# Patient Record
Sex: FEMALE | Race: WHITE | Employment: OTHER | URBAN - METROPOLITAN AREA
[De-identification: names, ages, dates, MRNs, and addresses within clinical notes are randomized per-mention and may not be internally consistent; named-entity substitution may affect disease eponyms.]

---

## 2019-03-30 LAB
ALBUMIN SERPL-MCNC: NORMAL G/DL
ALP BLD-CCNC: NORMAL U/L
ALT SERPL-CCNC: NORMAL U/L
ANION GAP SERPL CALCULATED.3IONS-SCNC: NORMAL MMOL/L
AST SERPL-CCNC: NORMAL U/L
AVERAGE GLUCOSE: NORMAL
BILIRUB SERPL-MCNC: NORMAL MG/DL (ref 0.1–1.4)
BUN BLDV-MCNC: NORMAL MG/DL
CALCIUM SERPL-MCNC: NORMAL MG/DL
CHLORIDE BLD-SCNC: NORMAL MMOL/L
CHOLESTEROL, TOTAL: NORMAL MG/DL
CHOLESTEROL/HDL RATIO: NORMAL
CO2: NORMAL MMOL/L
CREAT SERPL-MCNC: NORMAL MG/DL
GFR CALCULATED: NORMAL
GLUCOSE BLD-MCNC: NORMAL MG/DL
HBA1C MFR BLD: 5.4 %
HDLC SERPL-MCNC: NORMAL MG/DL (ref 35–70)
LDL CHOLESTEROL CALCULATED: NORMAL MG/DL (ref 0–160)
POTASSIUM SERPL-SCNC: NORMAL MMOL/L
SODIUM BLD-SCNC: NORMAL MMOL/L
TOTAL PROTEIN: NORMAL
TRIGL SERPL-MCNC: NORMAL MG/DL
VLDLC SERPL CALC-MCNC: NORMAL MG/DL

## 2019-05-15 ENCOUNTER — OFFICE VISIT (OUTPATIENT)
Dept: FAMILY MEDICINE CLINIC | Age: 79
End: 2019-05-15
Payer: MEDICARE

## 2019-05-15 VITALS
BODY MASS INDEX: 29.56 KG/M2 | TEMPERATURE: 98 F | SYSTOLIC BLOOD PRESSURE: 138 MMHG | OXYGEN SATURATION: 96 % | WEIGHT: 206 LBS | HEART RATE: 79 BPM | DIASTOLIC BLOOD PRESSURE: 64 MMHG

## 2019-05-15 DIAGNOSIS — R07.0 PAIN IN THROAT: ICD-10-CM

## 2019-05-15 DIAGNOSIS — R09.82 POSTNASAL DRIP: ICD-10-CM

## 2019-05-15 DIAGNOSIS — J01.90 ACUTE NON-RECURRENT SINUSITIS, UNSPECIFIED LOCATION: Primary | ICD-10-CM

## 2019-05-15 PROCEDURE — 99213 OFFICE O/P EST LOW 20 MIN: CPT | Performed by: PHYSICIAN ASSISTANT

## 2019-05-15 RX ORDER — AZITHROMYCIN 250 MG/1
250 TABLET, FILM COATED ORAL SEE ADMIN INSTRUCTIONS
Qty: 6 TABLET | Refills: 0 | Status: SHIPPED | OUTPATIENT
Start: 2019-05-15 | End: 2019-05-20

## 2019-05-15 RX ORDER — SIMVASTATIN 40 MG
40 TABLET ORAL
COMMUNITY
End: 2019-06-04 | Stop reason: ALTCHOICE

## 2019-05-15 RX ORDER — MELOXICAM 15 MG/1
TABLET ORAL
COMMUNITY
Start: 2014-11-10 | End: 2019-06-04 | Stop reason: SDUPTHER

## 2019-05-15 RX ORDER — DULOXETIN HYDROCHLORIDE 60 MG/1
CAPSULE, DELAYED RELEASE ORAL
Refills: 3 | COMMUNITY
Start: 2019-04-27 | End: 2019-09-05 | Stop reason: SDUPTHER

## 2019-05-15 RX ORDER — DOCUSATE SODIUM 100 MG/1
100 CAPSULE, LIQUID FILLED ORAL DAILY PRN
COMMUNITY
Start: 2011-11-23

## 2019-05-15 RX ORDER — HYDROCHLOROTHIAZIDE 25 MG/1
TABLET ORAL
COMMUNITY
Start: 2014-08-09 | End: 2019-06-04 | Stop reason: SDUPTHER

## 2019-05-15 SDOH — HEALTH STABILITY: MENTAL HEALTH: HOW OFTEN DO YOU HAVE A DRINK CONTAINING ALCOHOL?: NEVER

## 2019-05-15 NOTE — PROGRESS NOTES
NEPHRECTOMY      right     Medications:     Current Outpatient Medications:     meloxicam (MOBIC) 15 MG tablet, , Disp: , Rfl:     Multiple Vitamins-Minerals (ICAPS PO), Take  by mouth., Disp: , Rfl:     senna (SENOKOT) 8.6 MG tablet, Take 1 tablet by mouth., Disp: , Rfl:     aspirin 325 MG EC tablet, Take 325 mg by mouth daily. , Disp: , Rfl:     hydrochlorothiazide (HYDRODIURIL) 25 MG tablet, , Disp: , Rfl:     dorzolamide (TRUSOPT) 2 % ophthalmic solution, , Disp: , Rfl:     latanoprost (XALATAN) 0.005 % ophthalmic solution, , Disp: , Rfl:     timolol (TIMOPTIC) 0.5 % ophthalmic solution, , Disp: , Rfl:     metoprolol (TOPROL-XL) 25 MG XL tablet, Take 25 mg by mouth daily. , Disp: , Rfl:     simvastatin (ZOCOR) 40 MG tablet, Take 40 mg by mouth nightly.  , Disp: , Rfl:     Allergies: Allergies   Allergen Reactions    Penicillins        Social History:     Social History     Tobacco Use    Smoking status: Never Smoker    Smokeless tobacco: Never Used   Substance Use Topics    Alcohol use: No    Drug use: No       Physical Exam:     Vitals:    05/15/19 1452   BP: 138/64   Pulse: 79   Temp: 98 °F (36.7 °C)   SpO2: 96%   Weight: 206 lb (93.4 kg)       Exam:  Physical Exam  Nurse's notes and vital signs reviewed. The patient is not hypoxic. General: Alert, no acute distress, patient resting comfortably Patient is not toxic or lethargic. Skin: Warm, intact, no pallor noted. There is no evidence of rash at this time. Head: Normocephalic, atraumatic. Eye: Normal conjunctiva  Ears, Nose, Throat: Right tympanic membrane clear, left tympanic membrane clear. No drainage or discharge noted. No pre- or post-auricular tenderness, erythema, or swelling noted. Moderate nasal congestion rhinorrhea. No facial erythema. Posterior oropharynx shows no erythema, tonsillar hypertrophy, asymmetry or peritonsillar abscess and no evidence of exudate. the uvula is midline. No trismus or drooling is noted.  Moist mucous membranes. Neck: No anterior/posterior lymphadenopathy noted. No erythema, no masses, no fluctuance or induration noted. No meningeal signs. Cardio: Regular Rate and Rhythm  Respiratory: No acute distress, no rhonchi, wheezing or crackles noted. No stridor or retractions are noted. Abdomen: Normal bowel sounds, soft, nontender, no masses detected. No rebound, guarding, or rigidity noted. Musculoskeletal: No obvious deformity, no edema, no calf tenderness. No erythema. Neurological: A&O x4, normal speech  Psychiatric: Cooperative         Testing:           Medical Decision Making:   The patient has been seen and evaluated. The vital signs have been reviewed. The patient does not appear to be toxic or lethargic. The patient will be treated with azithromycin. She is tolerated this well in the past.    The patient was educated on the proper dosage of motrin and tylenol and the appropriate intervals of each. The patient is to increase fluid intake over the next several days. The patient is to use OTC decongestant as needed. Additionally she is to use over-the-counter nasal spray. The patient is to return to express care or go directly to the emergency department should any of the signs or symptoms worsen. The patient is to followup with primary care physician in 2-3 days for repeat evaluation. The patient has no other questions or concerns at this time the patient will be discharged home. Clinical Impression:   Severiano Pennant was seen today for congestion and cough. Diagnoses and all orders for this visit:    Acute non-recurrent sinusitis, unspecified location    Postnasal drip    Pain in throat    Other orders  -     azithromycin (ZITHROMAX) 250 MG tablet; Take 1 tablet by mouth See Admin Instructions for 5 days 500mg on day 1 followed by 250mg on days 2 - 5        The patient is to call for any concerns or return if any of the signs or symptoms worsen.  The patient is to follow-up with PCP in the next 2-3 days for repeat evaluation repeat assessment or go directly to the emergency department.      SIGNATURE: Matthew Bond III, PA-C

## 2019-06-04 ENCOUNTER — OFFICE VISIT (OUTPATIENT)
Dept: PRIMARY CARE CLINIC | Age: 79
End: 2019-06-04
Payer: MEDICARE

## 2019-06-04 VITALS
BODY MASS INDEX: 29.49 KG/M2 | SYSTOLIC BLOOD PRESSURE: 122 MMHG | OXYGEN SATURATION: 96 % | WEIGHT: 206 LBS | HEIGHT: 70 IN | HEART RATE: 104 BPM | DIASTOLIC BLOOD PRESSURE: 74 MMHG

## 2019-06-04 DIAGNOSIS — E78.5 HYPERLIPIDEMIA WITH TARGET LDL LESS THAN 100: Chronic | ICD-10-CM

## 2019-06-04 DIAGNOSIS — I80.01 PHLEBITIS OF SUPERFICIAL VEIN OF RIGHT LOWER EXTREMITY: ICD-10-CM

## 2019-06-04 DIAGNOSIS — N85.00 ENDOMETRIAL HYPERPLASIA: ICD-10-CM

## 2019-06-04 DIAGNOSIS — M25.552 HIP PAIN, ACUTE, LEFT: ICD-10-CM

## 2019-06-04 DIAGNOSIS — I10 HYPERTENSION, ESSENTIAL, BENIGN: Primary | Chronic | ICD-10-CM

## 2019-06-04 PROCEDURE — 99214 OFFICE O/P EST MOD 30 MIN: CPT | Performed by: FAMILY MEDICINE

## 2019-06-04 RX ORDER — SIMVASTATIN 40 MG
40 TABLET ORAL NIGHTLY
Qty: 90 TABLET | Refills: 3 | Status: SHIPPED
Start: 2019-06-04 | End: 2020-09-23 | Stop reason: SDUPTHER

## 2019-06-04 ASSESSMENT — ENCOUNTER SYMPTOMS
GASTROINTESTINAL NEGATIVE: 1
EYES NEGATIVE: 1
ALLERGIC/IMMUNOLOGIC NEGATIVE: 1
RESPIRATORY NEGATIVE: 1

## 2019-06-04 ASSESSMENT — PATIENT HEALTH QUESTIONNAIRE - PHQ9
SUM OF ALL RESPONSES TO PHQ QUESTIONS 1-9: 0
1. LITTLE INTEREST OR PLEASURE IN DOING THINGS: 0
SUM OF ALL RESPONSES TO PHQ9 QUESTIONS 1 & 2: 0
SUM OF ALL RESPONSES TO PHQ QUESTIONS 1-9: 0
2. FEELING DOWN, DEPRESSED OR HOPELESS: 0

## 2019-06-04 NOTE — PROGRESS NOTES
19     Mauricio Due    : 1940 Sex: female   Age: 70 y.o. Chief Complaint   Patient presents with    Hypertension    Hyperlipidemia    Hip Pain     Left hip pain x 1 week. Just taking tylenol       Prior to Admission medications    Medication Sig Start Date End Date Taking? Authorizing Provider   simvastatin (ZOCOR) 40 MG tablet Take 1 tablet by mouth nightly 19  Yes Raulito Guillory DO   DULoxetine (CYMBALTA) 60 MG extended release capsule  19  Yes Historical Provider, MD   docusate sodium (COLACE) 100 MG capsule Take 100 mg by mouth 11  Yes Historical Provider, MD   metoprolol tartrate (LOPRESSOR) 25 MG tablet  19  Yes Historical Provider, MD   meloxicam (MOBIC) 15 MG tablet  11/10/14  Yes Historical Provider, MD   Multiple Vitamins-Minerals (ICAPS PO) Take  by mouth. Yes Historical Provider, MD   hydrochlorothiazide (HYDRODIURIL) 25 MG tablet  14  Yes Historical Provider, MD   dorzolamide (TRUSOPT) 2 % ophthalmic solution  14  Yes Historical Provider, MD   latanoprost (XALATAN) 0.005 % ophthalmic solution  14  Yes Historical Provider, MD   timolol (TIMOPTIC) 0.5 % ophthalmic solution  14  Yes Historical Provider, MD          HPI: Patient presents today follow-up on hypertension superficial phlebitis hyperlipidemia all stable. Recent problems with endometrial hyperplasia for endometrial biopsy on the  of this month. Left hip pain we'll go ahead with an x-ray study most likely related to degenerative arthritis controlled currently with Tylenol. Review of Systems   Constitutional: Negative. HENT: Negative. Eyes: Negative. Respiratory: Negative. Gastrointestinal: Negative. Endocrine: Negative. Genitourinary: Negative. Musculoskeletal: Positive for arthralgias. Mild left hip pain   Skin: Negative. Allergic/Immunologic: Negative. Neurological: Negative. Hematological: Negative. Psychiatric/Behavioral: Negative. Current Outpatient Medications:     simvastatin (ZOCOR) 40 MG tablet, Take 1 tablet by mouth nightly, Disp: 90 tablet, Rfl: 3    DULoxetine (CYMBALTA) 60 MG extended release capsule, , Disp: , Rfl: 3    docusate sodium (COLACE) 100 MG capsule, Take 100 mg by mouth, Disp: , Rfl:     metoprolol tartrate (LOPRESSOR) 25 MG tablet, , Disp: , Rfl:     meloxicam (MOBIC) 15 MG tablet, , Disp: , Rfl:     Multiple Vitamins-Minerals (ICAPS PO), Take  by mouth., Disp: , Rfl:     hydrochlorothiazide (HYDRODIURIL) 25 MG tablet, , Disp: , Rfl:     dorzolamide (TRUSOPT) 2 % ophthalmic solution, , Disp: , Rfl:     latanoprost (XALATAN) 0.005 % ophthalmic solution, , Disp: , Rfl:     timolol (TIMOPTIC) 0.5 % ophthalmic solution, , Disp: , Rfl:     Allergies   Allergen Reactions    Penicillins        Social History     Tobacco Use    Smoking status: Never Smoker    Smokeless tobacco: Never Used   Substance Use Topics    Alcohol use: Never     Frequency: Never    Drug use: Never      Past Surgical History:   Procedure Laterality Date    APPENDECTOMY      BLADDER STONE REMOVAL      DILATION AND CURETTAGE OF UTERUS      HERNIA REPAIR      TOTAL KNEE ARTHROPLASTY      right    TOTAL NEPHRECTOMY      right     Family History   Problem Relation Age of Onset    Stroke Mother     Other Mother         phlebitis     Past Medical History:   Diagnosis Date    Arthritis     Cystitis     Depression     DVT of lower limb, acute ([facility] [address] 3/15/2013    Hernia     HIGH CHOLESTEROL     Hypertension     Kidney stones     Sinus infection     Superficial phlebitis of leg 10/30/2014       Vitals:    06/04/19 1314   BP: 122/74   Pulse: 104   SpO2: 96%   Weight: 206 lb (93.4 kg)   Height: 5' 10\" (1.778 m)     BP Readings from Last 3 Encounters:   06/04/19 122/74   05/15/19 138/64        Physical Exam   Constitutional: She is oriented to person, place, and time.  She appears well-developed and well-nourished. HENT:   Head: Normocephalic. Right Ear: External ear normal.   Left Ear: External ear normal.   Nose: Nose normal.   Mouth/Throat: Oropharynx is clear and moist.   Eyes: Pupils are equal, round, and reactive to light. Conjunctivae and EOM are normal.   Neck: Normal range of motion. Neck supple. Cardiovascular: Normal rate and intact distal pulses. Pulmonary/Chest: Breath sounds normal.   Abdominal: Soft. Bowel sounds are normal.   Musculoskeletal: Normal range of motion. Some problems with left hip pain to palpation. mild difficulty with ambulation. Denies trauma   Neurological: She is alert and oriented to person, place, and time. Skin: Skin is warm and dry. Psychiatric: She has a normal mood and affect. Her behavior is normal.   Nursing note and vitals reviewed. plan x-ray today. Maintain current medications. Medically stable for recommended surgical procedure. Reassessment in 3 months lab work prior. Plan Per Assessment:  There are no diagnoses linked to this encounter. No follow-ups on file. Rony Lockhart DO    Note was generated with the assistance of voice recognition software. Document was reviewed however may contain grammatical errors.

## 2019-06-19 ENCOUNTER — HOSPITAL ENCOUNTER (OUTPATIENT)
Age: 79
Discharge: HOME | End: 2019-06-21

## 2019-06-19 PROCEDURE — 88305 TISSUE EXAM BY PATHOLOGIST: CPT

## 2019-07-23 RX ORDER — MELOXICAM 15 MG/1
15 TABLET ORAL DAILY
Qty: 30 TABLET | Refills: 3 | Status: SHIPPED | OUTPATIENT
Start: 2019-07-23 | End: 2019-09-05 | Stop reason: SDUPTHER

## 2019-09-05 ENCOUNTER — OFFICE VISIT (OUTPATIENT)
Dept: PRIMARY CARE CLINIC | Age: 79
End: 2019-09-05
Payer: MEDICARE

## 2019-09-05 VITALS
TEMPERATURE: 98.4 F | BODY MASS INDEX: 29.56 KG/M2 | HEART RATE: 100 BPM | RESPIRATION RATE: 18 BRPM | DIASTOLIC BLOOD PRESSURE: 82 MMHG | WEIGHT: 206 LBS | OXYGEN SATURATION: 93 % | SYSTOLIC BLOOD PRESSURE: 124 MMHG

## 2019-09-05 DIAGNOSIS — H40.10X1 OPEN-ANGLE GLAUCOMA OF BOTH EYES, MILD STAGE, UNSPECIFIED OPEN-ANGLE GLAUCOMA TYPE: ICD-10-CM

## 2019-09-05 DIAGNOSIS — Z96.651 HISTORY OF TOTAL RIGHT KNEE REPLACEMENT: ICD-10-CM

## 2019-09-05 DIAGNOSIS — I10 HYPERTENSION, ESSENTIAL, BENIGN: Primary | Chronic | ICD-10-CM

## 2019-09-05 DIAGNOSIS — I80.01 PHLEBITIS OF SUPERFICIAL VEIN OF RIGHT LOWER EXTREMITY: ICD-10-CM

## 2019-09-05 DIAGNOSIS — Z86.718 HISTORY OF DVT (DEEP VEIN THROMBOSIS): ICD-10-CM

## 2019-09-05 DIAGNOSIS — E78.5 HYPERLIPIDEMIA WITH TARGET LDL LESS THAN 100: Chronic | ICD-10-CM

## 2019-09-05 DIAGNOSIS — M79.7 FIBROMYALGIA: ICD-10-CM

## 2019-09-05 PROCEDURE — 99214 OFFICE O/P EST MOD 30 MIN: CPT | Performed by: FAMILY MEDICINE

## 2019-09-05 RX ORDER — ASPIRIN 81 MG/1
TABLET, CHEWABLE ORAL
COMMUNITY
Start: 2014-12-09 | End: 2019-12-10 | Stop reason: CLARIF

## 2019-09-05 RX ORDER — DULOXETIN HYDROCHLORIDE 60 MG/1
60 CAPSULE, DELAYED RELEASE ORAL DAILY
Qty: 30 CAPSULE | Refills: 3 | Status: SHIPPED
Start: 2019-09-05 | End: 2020-03-12 | Stop reason: SDUPTHER

## 2019-09-05 RX ORDER — MELOXICAM 15 MG/1
15 TABLET ORAL DAILY
Qty: 90 TABLET | Refills: 3 | Status: SHIPPED | OUTPATIENT
Start: 2019-09-05 | End: 2019-10-07 | Stop reason: ALTCHOICE

## 2019-09-05 ASSESSMENT — ENCOUNTER SYMPTOMS
RESPIRATORY NEGATIVE: 1
GASTROINTESTINAL NEGATIVE: 1
EYES NEGATIVE: 1
ALLERGIC/IMMUNOLOGIC NEGATIVE: 1

## 2019-09-05 NOTE — PROGRESS NOTES
Last 3 Encounters:   09/05/19 124/82   06/04/19 122/74   05/15/19 138/64        Physical Exam   Constitutional: She is oriented to person, place, and time. She appears well-developed and well-nourished. HENT:   Head: Normocephalic. Right Ear: External ear normal.   Left Ear: External ear normal.   Nose: Nose normal.   Mouth/Throat: Oropharynx is clear and moist.   Eyes: Pupils are equal, round, and reactive to light. Conjunctivae and EOM are normal.   Neck: Normal range of motion. Neck supple. Cardiovascular: Normal rate and intact distal pulses. Pulmonary/Chest: Breath sounds normal.   Abdominal: Soft. Bowel sounds are normal.   Musculoskeletal: Normal range of motion. Neurological: She is alert and oriented to person, place, and time. Skin: Skin is warm and dry. Psychiatric: She has a normal mood and affect. Her behavior is normal.   Nursing note and vitals reviewed. Exam findings are stable as noted. No significant joint swelling noted. Peripheral pulses intact and equal.  Neurologically stable. Labs will be completed with next visit. Reassessment 3 months. Plan Per Assessment:  Zeke Barron was seen today for hypertension and hyperlipidemia. Diagnoses and all orders for this visit:    Hypertension, essential, benign  -     CBC Auto Differential; Future  -     Comprehensive Metabolic Panel; Future  -     Lipid Panel; Future  -     T4; Future  -     TSH without Reflex; Future    Phlebitis of superficial vein of right lower extremity    Hyperlipidemia with target LDL less than 100  -     CBC Auto Differential; Future  -     Comprehensive Metabolic Panel; Future  -     Lipid Panel; Future  -     T4; Future  -     TSH without Reflex; Future    History of DVT (deep vein thrombosis)    Fibromyalgia  -     CBC Auto Differential; Future  -     Comprehensive Metabolic Panel; Future  -     Lipid Panel; Future  -     T4; Future  -     TSH without Reflex;  Future    Open-angle glaucoma of both

## 2019-09-13 ENCOUNTER — NURSE ONLY (OUTPATIENT)
Dept: PRIMARY CARE CLINIC | Age: 79
End: 2019-09-13
Payer: MEDICARE

## 2019-09-13 DIAGNOSIS — Z23 NEED FOR INFLUENZA VACCINATION: Primary | ICD-10-CM

## 2019-09-13 PROCEDURE — G0008 ADMIN INFLUENZA VIRUS VAC: HCPCS | Performed by: FAMILY MEDICINE

## 2019-09-13 PROCEDURE — 90653 IIV ADJUVANT VACCINE IM: CPT | Performed by: FAMILY MEDICINE

## 2019-09-20 RX ORDER — MECLIZINE HCL 12.5 MG/1
12.5 TABLET ORAL 3 TIMES DAILY PRN
COMMUNITY
End: 2019-10-07 | Stop reason: ALTCHOICE

## 2019-10-07 ENCOUNTER — OFFICE VISIT (OUTPATIENT)
Dept: FAMILY MEDICINE CLINIC | Age: 79
End: 2019-10-07
Payer: MEDICARE

## 2019-10-07 VITALS
SYSTOLIC BLOOD PRESSURE: 122 MMHG | DIASTOLIC BLOOD PRESSURE: 76 MMHG | HEIGHT: 70 IN | OXYGEN SATURATION: 98 % | RESPIRATION RATE: 22 BRPM | BODY MASS INDEX: 29.49 KG/M2 | HEART RATE: 82 BPM | WEIGHT: 206 LBS | TEMPERATURE: 97.8 F

## 2019-10-07 DIAGNOSIS — R09.82 POSTNASAL DRIP: ICD-10-CM

## 2019-10-07 DIAGNOSIS — R07.0 PAIN IN THROAT: ICD-10-CM

## 2019-10-07 DIAGNOSIS — J01.90 ACUTE NON-RECURRENT SINUSITIS, UNSPECIFIED LOCATION: Primary | ICD-10-CM

## 2019-10-07 PROCEDURE — 99213 OFFICE O/P EST LOW 20 MIN: CPT | Performed by: PHYSICIAN ASSISTANT

## 2019-10-07 RX ORDER — AZITHROMYCIN 250 MG/1
250 TABLET, FILM COATED ORAL SEE ADMIN INSTRUCTIONS
Qty: 6 TABLET | Refills: 0 | Status: SHIPPED | OUTPATIENT
Start: 2019-10-07 | End: 2019-10-12

## 2019-11-21 ENCOUNTER — TELEPHONE (OUTPATIENT)
Dept: PRIMARY CARE CLINIC | Age: 79
End: 2019-11-21

## 2019-11-21 RX ORDER — AMOXICILLIN 500 MG/1
500 CAPSULE ORAL 3 TIMES DAILY
Status: CANCELLED | OUTPATIENT
Start: 2019-11-21

## 2019-11-21 RX ORDER — AZITHROMYCIN 250 MG/1
500 TABLET, FILM COATED ORAL DAILY
Qty: 4 TABLET | Refills: 0 | Status: SHIPPED | OUTPATIENT
Start: 2019-11-21 | End: 2019-12-10 | Stop reason: CLARIF

## 2019-11-21 RX ORDER — AZITHROMYCIN 250 MG/1
500 TABLET, FILM COATED ORAL DAILY
COMMUNITY
End: 2019-11-21 | Stop reason: SDUPTHER

## 2019-11-21 RX ORDER — AMOXICILLIN 500 MG/1
500 CAPSULE ORAL 3 TIMES DAILY
COMMUNITY
End: 2019-12-10 | Stop reason: CLARIF

## 2019-12-09 ENCOUNTER — HOSPITAL ENCOUNTER (OUTPATIENT)
Age: 79
Discharge: HOME | End: 2019-12-09
Payer: MEDICARE

## 2019-12-09 LAB
ALBUMIN SERPL-MCNC: 3.9 G/DL (ref 3.5–5.2)
ALP BLD-CCNC: 40 U/L (ref 35–104)
ALT SERPL-CCNC: 10 U/L (ref 0–32)
ANION GAP SERPL CALCULATED.3IONS-SCNC: 11 MMOL/L (ref 7–16)
AST SERPL-CCNC: 18 U/L (ref 0–31)
BASOPHILS ABSOLUTE: 0.02 E9/L (ref 0–0.2)
BASOPHILS RELATIVE PERCENT: 0.4 % (ref 0–2)
BILIRUB SERPL-MCNC: 0.7 MG/DL (ref 0–1.2)
BUN BLDV-MCNC: 15 MG/DL (ref 8–23)
CALCIUM SERPL-MCNC: 9.7 MG/DL (ref 8.6–10.2)
CHLORIDE BLD-SCNC: 106 MMOL/L (ref 98–107)
CHOLESTEROL, TOTAL: 161 MG/DL (ref 0–199)
CO2: 25 MMOL/L (ref 22–29)
CREAT SERPL-MCNC: 0.9 MG/DL (ref 0.5–1)
EOSINOPHILS ABSOLUTE: 0.19 E9/L (ref 0.05–0.5)
EOSINOPHILS RELATIVE PERCENT: 3.4 % (ref 0–6)
GFR AFRICAN AMERICAN: >60
GFR NON-AFRICAN AMERICAN: >60 ML/MIN/1.73
GLUCOSE BLD-MCNC: 109 MG/DL (ref 74–99)
HCT VFR BLD CALC: 42.8 % (ref 34–48)
HDLC SERPL-MCNC: 41 MG/DL
HEMOGLOBIN: 14.4 G/DL (ref 11.5–15.5)
IMMATURE GRANULOCYTES #: 0.02 E9/L
IMMATURE GRANULOCYTES %: 0.4 % (ref 0–5)
LDL CHOLESTEROL CALCULATED: 86 MG/DL (ref 0–99)
LYMPHOCYTES ABSOLUTE: 1.41 E9/L (ref 1.5–4)
LYMPHOCYTES RELATIVE PERCENT: 25 % (ref 20–42)
MCH RBC QN AUTO: 31.9 PG (ref 26–35)
MCHC RBC AUTO-ENTMCNC: 33.6 % (ref 32–34.5)
MCV RBC AUTO: 94.7 FL (ref 80–99.9)
MONOCYTES ABSOLUTE: 0.45 E9/L (ref 0.1–0.95)
MONOCYTES RELATIVE PERCENT: 8 % (ref 2–12)
NEUTROPHILS ABSOLUTE: 3.55 E9/L (ref 1.8–7.3)
NEUTROPHILS RELATIVE PERCENT: 62.8 % (ref 43–80)
PDW BLD-RTO: 12.6 FL (ref 11.5–15)
PLATELET # BLD: 197 E9/L (ref 130–450)
PMV BLD AUTO: 10.1 FL (ref 7–12)
POTASSIUM SERPL-SCNC: 4.5 MMOL/L (ref 3.5–5)
RBC # BLD: 4.52 E12/L (ref 3.5–5.5)
SODIUM BLD-SCNC: 142 MMOL/L (ref 132–146)
T4 TOTAL: 7.7 MCG/DL (ref 4.5–11.7)
TOTAL PROTEIN: 6.7 G/DL (ref 6.4–8.3)
TRIGL SERPL-MCNC: 168 MG/DL (ref 0–149)
TSH SERPL DL<=0.05 MIU/L-ACNC: 1.98 UIU/ML (ref 0.27–4.2)
VLDLC SERPL CALC-MCNC: 34 MG/DL
WBC # BLD: 5.6 E9/L (ref 4.5–11.5)

## 2019-12-09 PROCEDURE — 80061 LIPID PANEL: CPT

## 2019-12-09 PROCEDURE — 80053 COMPREHEN METABOLIC PANEL: CPT

## 2019-12-09 PROCEDURE — 84436 ASSAY OF TOTAL THYROXINE: CPT

## 2019-12-09 PROCEDURE — 84443 ASSAY THYROID STIM HORMONE: CPT

## 2019-12-09 PROCEDURE — 85025 COMPLETE CBC W/AUTO DIFF WBC: CPT

## 2019-12-09 PROCEDURE — 36415 COLL VENOUS BLD VENIPUNCTURE: CPT

## 2019-12-10 ENCOUNTER — OFFICE VISIT (OUTPATIENT)
Dept: PRIMARY CARE CLINIC | Age: 79
End: 2019-12-10
Payer: MEDICARE

## 2019-12-10 VITALS
OXYGEN SATURATION: 95 % | BODY MASS INDEX: 29.99 KG/M2 | HEART RATE: 105 BPM | SYSTOLIC BLOOD PRESSURE: 118 MMHG | WEIGHT: 209 LBS | DIASTOLIC BLOOD PRESSURE: 78 MMHG

## 2019-12-10 DIAGNOSIS — M51.37 DISC DISEASE, DEGENERATIVE, LUMBAR OR LUMBOSACRAL: ICD-10-CM

## 2019-12-10 DIAGNOSIS — E78.5 HYPERLIPIDEMIA WITH TARGET LDL LESS THAN 100: Chronic | ICD-10-CM

## 2019-12-10 DIAGNOSIS — M79.7 FIBROMYALGIA: ICD-10-CM

## 2019-12-10 DIAGNOSIS — I10 HYPERTENSION, ESSENTIAL, BENIGN: Primary | Chronic | ICD-10-CM

## 2019-12-10 DIAGNOSIS — Z96.651 HISTORY OF TOTAL RIGHT KNEE REPLACEMENT: ICD-10-CM

## 2019-12-10 PROCEDURE — 99214 OFFICE O/P EST MOD 30 MIN: CPT | Performed by: FAMILY MEDICINE

## 2019-12-10 RX ORDER — MELOXICAM 15 MG/1
15 TABLET ORAL DAILY
COMMUNITY
End: 2020-06-18 | Stop reason: SDUPTHER

## 2019-12-10 ASSESSMENT — ENCOUNTER SYMPTOMS
EYES NEGATIVE: 1
ALLERGIC/IMMUNOLOGIC NEGATIVE: 1
GASTROINTESTINAL NEGATIVE: 1
RESPIRATORY NEGATIVE: 1

## 2020-03-12 ENCOUNTER — OFFICE VISIT (OUTPATIENT)
Dept: PRIMARY CARE CLINIC | Age: 80
End: 2020-03-12
Payer: MEDICARE

## 2020-03-12 VITALS
HEART RATE: 77 BPM | OXYGEN SATURATION: 95 % | BODY MASS INDEX: 29.99 KG/M2 | DIASTOLIC BLOOD PRESSURE: 70 MMHG | RESPIRATION RATE: 12 BRPM | SYSTOLIC BLOOD PRESSURE: 118 MMHG | TEMPERATURE: 98.1 F | WEIGHT: 209 LBS

## 2020-03-12 PROBLEM — R73.01 IMPAIRED FASTING GLUCOSE: Status: ACTIVE | Noted: 2020-03-12

## 2020-03-12 PROCEDURE — 99214 OFFICE O/P EST MOD 30 MIN: CPT | Performed by: FAMILY MEDICINE

## 2020-03-12 RX ORDER — DULOXETIN HYDROCHLORIDE 60 MG/1
60 CAPSULE, DELAYED RELEASE ORAL DAILY
Qty: 30 CAPSULE | Refills: 3 | Status: SHIPPED
Start: 2020-03-12 | End: 2020-06-18 | Stop reason: SDUPTHER

## 2020-03-12 ASSESSMENT — ENCOUNTER SYMPTOMS
EYES NEGATIVE: 1
RESPIRATORY NEGATIVE: 1
ALLERGIC/IMMUNOLOGIC NEGATIVE: 1
GASTROINTESTINAL NEGATIVE: 1

## 2020-03-12 ASSESSMENT — PATIENT HEALTH QUESTIONNAIRE - PHQ9
SUM OF ALL RESPONSES TO PHQ9 QUESTIONS 1 & 2: 0
2. FEELING DOWN, DEPRESSED OR HOPELESS: 0
SUM OF ALL RESPONSES TO PHQ QUESTIONS 1-9: 0
1. LITTLE INTEREST OR PLEASURE IN DOING THINGS: 0
SUM OF ALL RESPONSES TO PHQ QUESTIONS 1-9: 0

## 2020-03-12 NOTE — PROGRESS NOTES
3/12/20     Day Poor    : 1940 Sex: female   Age: 70 y.o. Chief Complaint   Patient presents with    Hypertension       Prior to Admission medications    Medication Sig Start Date End Date Taking? Authorizing Provider   DULoxetine (CYMBALTA) 60 MG extended release capsule Take 1 capsule by mouth daily 3/12/20  Yes Kourtney Guillory DO   meloxicam (MOBIC) 15 MG tablet Take 15 mg by mouth daily   Yes Historical Provider, MD   metoprolol tartrate (LOPRESSOR) 25 MG tablet Take 1 tablet by mouth daily 12/10/19  Yes Kourtney Guillory DO   Psyllium (VEGETABLE LAXATIVE PO) Take by mouth   Yes Historical Provider, MD   simvastatin (ZOCOR) 40 MG tablet Take 1 tablet by mouth nightly 19  Yes Kourtney Guillory DO   docusate sodium (COLACE) 100 MG capsule Take 100 mg by mouth 11  Yes Historical Provider, MD   Multiple Vitamins-Minerals (ICAPS PO) Take  by mouth. Yes Historical Provider, MD   hydrochlorothiazide (HYDRODIURIL) 25 MG tablet  14  Yes Historical Provider, MD   dorzolamide (TRUSOPT) 2 % ophthalmic solution  14  Yes Historical Provider, MD   latanoprost (XALATAN) 0.005 % ophthalmic solution  14  Yes Historical Provider, MD   timolol (TIMOPTIC) 0.5 % ophthalmic solution  14  Yes Historical Provider, MD          HPI: Patient is seen today in medical follow-up on hypertension hyperlipidemia impaired fasting glucose all of which is been stable. Bilateral glaucoma improved with stent placements. Review of Systems   Constitutional: Negative. HENT: Negative. Eyes: Negative. Respiratory: Negative. Gastrointestinal: Negative. Endocrine: Negative. Genitourinary: Negative. Musculoskeletal: Negative. Skin: Negative. Allergic/Immunologic: Negative. Neurological: Negative. Hematological: Negative. Psychiatric/Behavioral: Negative.                Current Outpatient Medications:     DULoxetine (CYMBALTA) 60 MG extended release capsule, Take 1 capsule by mouth daily, Disp: 30 capsule, Rfl: 3    meloxicam (MOBIC) 15 MG tablet, Take 15 mg by mouth daily, Disp: , Rfl:     metoprolol tartrate (LOPRESSOR) 25 MG tablet, Take 1 tablet by mouth daily, Disp: 90 tablet, Rfl: 3    Psyllium (VEGETABLE LAXATIVE PO), Take by mouth, Disp: , Rfl:     simvastatin (ZOCOR) 40 MG tablet, Take 1 tablet by mouth nightly, Disp: 90 tablet, Rfl: 3    docusate sodium (COLACE) 100 MG capsule, Take 100 mg by mouth, Disp: , Rfl:     Multiple Vitamins-Minerals (ICAPS PO), Take  by mouth., Disp: , Rfl:     hydrochlorothiazide (HYDRODIURIL) 25 MG tablet, , Disp: , Rfl:     dorzolamide (TRUSOPT) 2 % ophthalmic solution, , Disp: , Rfl:     latanoprost (XALATAN) 0.005 % ophthalmic solution, , Disp: , Rfl:     timolol (TIMOPTIC) 0.5 % ophthalmic solution, , Disp: , Rfl:     Allergies   Allergen Reactions    Penicillins        Social History     Tobacco Use    Smoking status: Never Smoker    Smokeless tobacco: Never Used   Substance Use Topics    Alcohol use: Never     Frequency: Never    Drug use: Never      Past Surgical History:   Procedure Laterality Date    APPENDECTOMY      BLADDER STONE REMOVAL      COLONOSCOPY      DILATION AND CURETTAGE OF UTERUS      HERNIA REPAIR      TOTAL KNEE ARTHROPLASTY      right    TOTAL NEPHRECTOMY      right     Family History   Problem Relation Age of Onset    Stroke Mother     Other Mother         phlebitis     Past Medical History:   Diagnosis Date    Arthritis     Cystitis     Depression     DVT of lower limb, acute ([facility] [address] 3/15/2013    Hernia     HIGH CHOLESTEROL     Hypertension     Kidney stones     Sinus infection     Superficial phlebitis of leg 10/30/2014       Vitals:    03/12/20 1527   BP: 118/70   Pulse: 77   Resp: 12   Temp: 98.1 °F (36.7 °C)   TempSrc: Temporal   SpO2: 95%   Weight: 209 lb (94.8 kg)     BP Readings from Last 3 Encounters:   03/12/20 118/70   12/10/19 118/78   10/07/19 122/76 120/70    Physical Exam  Vitals signs and nursing note reviewed. Constitutional:       Appearance: She is well-developed. HENT:      Head: Normocephalic. Right Ear: External ear normal.      Left Ear: External ear normal.      Nose: Nose normal.   Eyes:      Conjunctiva/sclera: Conjunctivae normal.      Pupils: Pupils are equal, round, and reactive to light. Neck:      Musculoskeletal: Normal range of motion and neck supple. Cardiovascular:      Rate and Rhythm: Normal rate. Pulmonary:      Breath sounds: Normal breath sounds. Abdominal:      General: Bowel sounds are normal.      Palpations: Abdomen is soft. Musculoskeletal: Normal range of motion. Skin:     General: Skin is warm and dry. Neurological:      Mental Status: She is alert and oriented to person, place, and time. Psychiatric:         Behavior: Behavior normal.     Today's vitals physical examination stable. We will maintain present medications and care. Labs reviewed and stable. Reassess in 3 months with blood work at that time.   Lab Results   Component Value Date    TSH 1.980 12/09/2019    G4IFPMB 7.7 12/09/2019     Lab Results   Component Value Date    CHOL 161 12/09/2019     Lab Results   Component Value Date    TRIG 168 (H) 12/09/2019     Lab Results   Component Value Date    HDL 41 12/09/2019     No results found for: [facility]  Lab Results   Component Value Date    LABVLDL 34 12/09/2019     No results found for: [facility]  Lab Results   Component Value Date    WBC 5.6 12/09/2019    HGB 14.4 12/09/2019    HCT 42.8 12/09/2019    MCV 94.7 12/09/2019     12/09/2019    LYMPHOPCT 25.0 12/09/2019    RBC 4.52 12/09/2019    MCH 31.9 12/09/2019    MCHC 33.6 12/09/2019    RDW 12.6 12/09/2019     Lab Results   Component Value Date     12/09/2019    K 4.5 12/09/2019     12/09/2019    CO2 25 12/09/2019    BUN 15 12/09/2019    CREATININE 0.9 12/09/2019    GLUCOSE 109 (H) 12/09/2019    CALCIUM 9.7 12/09/2019    PROT 6.7 12/09/2019    LABALBU 3.9 12/09/2019    BILITOT 0.7 12/09/2019    ALKPHOS 40 12/09/2019    AST 18 12/09/2019    ALT 10 12/09/2019    LABGLOM >60 12/09/2019    GFRAA >60 12/09/2019      No results found for: PSA, PSADIA   Lab Results   Component Value Date    LABA1C 5.4 03/30/2019     No results found for: EAG           Plan Per Assessment:  Susie Yeung was seen today for hypertension. Diagnoses and all orders for this visit:    Hypertension, essential, benign  -     CBC Auto Differential; Future  -     Comprehensive Metabolic Panel; Future  -     Lipid Panel; Future  -     T4; Future  -     TSH without Reflex; Future  -     Hemoglobin A1C; Future    Open-angle glaucoma of both eyes, mild stage, unspecified open-angle glaucoma type    History of total right knee replacement    Hyperlipidemia, unspecified hyperlipidemia type  -     CBC Auto Differential; Future  -     Comprehensive Metabolic Panel; Future  -     Lipid Panel; Future  -     T4; Future  -     TSH without Reflex; Future  -     Hemoglobin A1C; Future    Impaired fasting glucose  -     CBC Auto Differential; Future  -     Comprehensive Metabolic Panel; Future  -     Lipid Panel; Future  -     T4; Future  -     TSH without Reflex; Future  -     Hemoglobin A1C; Future    Other orders  -     DULoxetine (CYMBALTA) 60 MG extended release capsule; Take 1 capsule by mouth daily            Return in about 3 months (around 6/12/2020). Chi Cui DO    Note was generated with the assistance of voice recognition software. Document was reviewed however may contain grammatical errors.

## 2020-06-16 ENCOUNTER — HOSPITAL ENCOUNTER (OUTPATIENT)
Age: 80
Discharge: HOME | End: 2020-06-16
Payer: MEDICARE

## 2020-06-16 LAB
ALBUMIN SERPL-MCNC: 4.3 G/DL (ref 3.5–5.2)
ALP BLD-CCNC: 42 U/L (ref 35–104)
ALT SERPL-CCNC: 8 U/L (ref 0–32)
ANION GAP SERPL CALCULATED.3IONS-SCNC: 12 MMOL/L (ref 7–16)
AST SERPL-CCNC: 16 U/L (ref 0–31)
BASOPHILS ABSOLUTE: 0.02 E9/L (ref 0–0.2)
BASOPHILS RELATIVE PERCENT: 0.4 % (ref 0–2)
BILIRUB SERPL-MCNC: 0.5 MG/DL (ref 0–1.2)
BUN BLDV-MCNC: 15 MG/DL (ref 8–23)
CALCIUM SERPL-MCNC: 10.2 MG/DL (ref 8.6–10.2)
CHLORIDE BLD-SCNC: 104 MMOL/L (ref 98–107)
CHOLESTEROL, TOTAL: 150 MG/DL (ref 0–199)
CO2: 27 MMOL/L (ref 22–29)
CREAT SERPL-MCNC: 1 MG/DL (ref 0.5–1)
EOSINOPHILS ABSOLUTE: 0.15 E9/L (ref 0.05–0.5)
EOSINOPHILS RELATIVE PERCENT: 2.9 % (ref 0–6)
GFR AFRICAN AMERICAN: >60
GFR NON-AFRICAN AMERICAN: 54 ML/MIN/1.73
GLUCOSE BLD-MCNC: 102 MG/DL (ref 74–99)
HBA1C MFR BLD: 5.7 % (ref 4–5.6)
HCT VFR BLD CALC: 43.5 % (ref 34–48)
HDLC SERPL-MCNC: 39 MG/DL
HEMOGLOBIN: 14.7 G/DL (ref 11.5–15.5)
IMMATURE GRANULOCYTES #: 0.02 E9/L
IMMATURE GRANULOCYTES %: 0.4 % (ref 0–5)
LDL CHOLESTEROL CALCULATED: 65 MG/DL (ref 0–99)
LYMPHOCYTES ABSOLUTE: 1.3 E9/L (ref 1.5–4)
LYMPHOCYTES RELATIVE PERCENT: 25.5 % (ref 20–42)
MCH RBC QN AUTO: 31.7 PG (ref 26–35)
MCHC RBC AUTO-ENTMCNC: 33.8 % (ref 32–34.5)
MCV RBC AUTO: 93.8 FL (ref 80–99.9)
MONOCYTES ABSOLUTE: 0.46 E9/L (ref 0.1–0.95)
MONOCYTES RELATIVE PERCENT: 9 % (ref 2–12)
NEUTROPHILS ABSOLUTE: 3.14 E9/L (ref 1.8–7.3)
NEUTROPHILS RELATIVE PERCENT: 61.8 % (ref 43–80)
PDW BLD-RTO: 12.7 FL (ref 11.5–15)
PLATELET # BLD: 180 E9/L (ref 130–450)
PMV BLD AUTO: 9.8 FL (ref 7–12)
POTASSIUM SERPL-SCNC: 4.4 MMOL/L (ref 3.5–5)
RBC # BLD: 4.64 E12/L (ref 3.5–5.5)
SODIUM BLD-SCNC: 143 MMOL/L (ref 132–146)
T4 TOTAL: 8.1 MCG/DL (ref 4.5–11.7)
TOTAL PROTEIN: 7.2 G/DL (ref 6.4–8.3)
TRIGL SERPL-MCNC: 232 MG/DL (ref 0–149)
TSH SERPL DL<=0.05 MIU/L-ACNC: 3.07 UIU/ML (ref 0.27–4.2)
VLDLC SERPL CALC-MCNC: 46 MG/DL
WBC # BLD: 5.1 E9/L (ref 4.5–11.5)

## 2020-06-16 PROCEDURE — 36415 COLL VENOUS BLD VENIPUNCTURE: CPT

## 2020-06-16 PROCEDURE — 80053 COMPREHEN METABOLIC PANEL: CPT

## 2020-06-16 PROCEDURE — 80061 LIPID PANEL: CPT

## 2020-06-16 PROCEDURE — 85025 COMPLETE CBC W/AUTO DIFF WBC: CPT

## 2020-06-16 PROCEDURE — 83036 HEMOGLOBIN GLYCOSYLATED A1C: CPT

## 2020-06-16 PROCEDURE — 84436 ASSAY OF TOTAL THYROXINE: CPT

## 2020-06-16 PROCEDURE — 84443 ASSAY THYROID STIM HORMONE: CPT

## 2020-06-18 ENCOUNTER — OFFICE VISIT (OUTPATIENT)
Dept: PRIMARY CARE CLINIC | Age: 80
End: 2020-06-18
Payer: MEDICARE

## 2020-06-18 VITALS
SYSTOLIC BLOOD PRESSURE: 132 MMHG | BODY MASS INDEX: 29.84 KG/M2 | OXYGEN SATURATION: 97 % | WEIGHT: 208 LBS | DIASTOLIC BLOOD PRESSURE: 84 MMHG | HEART RATE: 80 BPM

## 2020-06-18 PROBLEM — H25.9 AGE-RELATED CATARACT OF BOTH EYES: Status: ACTIVE | Noted: 2020-06-18

## 2020-06-18 PROCEDURE — 99214 OFFICE O/P EST MOD 30 MIN: CPT | Performed by: FAMILY MEDICINE

## 2020-06-18 RX ORDER — MELOXICAM 15 MG/1
15 TABLET ORAL DAILY
Qty: 90 TABLET | Refills: 3 | Status: ON HOLD | OUTPATIENT
Start: 2020-06-18 | End: 2020-12-23 | Stop reason: HOSPADM

## 2020-06-18 RX ORDER — DULOXETIN HYDROCHLORIDE 60 MG/1
60 CAPSULE, DELAYED RELEASE ORAL DAILY
Qty: 30 CAPSULE | Refills: 5 | Status: SHIPPED | OUTPATIENT
Start: 2020-06-18 | End: 2021-05-10 | Stop reason: SDUPTHER

## 2020-06-18 ASSESSMENT — ENCOUNTER SYMPTOMS
EYES NEGATIVE: 1
RESPIRATORY NEGATIVE: 1
GASTROINTESTINAL NEGATIVE: 1
ALLERGIC/IMMUNOLOGIC NEGATIVE: 1

## 2020-06-18 NOTE — PROGRESS NOTES
Endocrine: Negative. Genitourinary: Negative. Musculoskeletal: Negative. Skin: Negative. Allergic/Immunologic: Negative. Neurological: Negative. Hematological: Negative. Psychiatric/Behavioral: Negative. Present systems review is stable. [facility] voices no specific complaints.         Current Outpatient Medications:     DULoxetine (CYMBALTA) 60 MG extended release capsule, Take 1 capsule by mouth daily, Disp: 30 capsule, Rfl: 3    meloxicam (MOBIC) 15 MG tablet, Take 15 mg by mouth daily, Disp: , Rfl:     metoprolol tartrate (LOPRESSOR) 25 MG tablet, Take 1 tablet by mouth daily, Disp: 90 tablet, Rfl: 3    Psyllium (VEGETABLE LAXATIVE PO), Take by mouth, Disp: , Rfl:     simvastatin (ZOCOR) 40 MG tablet, Take 1 tablet by mouth nightly, Disp: 90 tablet, Rfl: 3    docusate sodium (COLACE) 100 MG capsule, Take 100 mg by mouth, Disp: , Rfl:     Multiple Vitamins-Minerals (ICAPS PO), Take  by mouth., Disp: , Rfl:     hydrochlorothiazide (HYDRODIURIL) 25 MG tablet, , Disp: , Rfl:     timolol (TIMOPTIC) 0.5 % ophthalmic solution, , Disp: , Rfl:     Allergies   Allergen Reactions    Penicillins        Social History     Tobacco Use    Smoking status: Never Smoker    Smokeless tobacco: Never Used   Substance Use Topics    Alcohol use: Never     Frequency: Never    Drug use: Never      Past Surgical History:   Procedure Laterality Date    APPENDECTOMY      BLADDER STONE REMOVAL      COLONOSCOPY      DILATION AND CURETTAGE OF UTERUS      HERNIA REPAIR      TOTAL KNEE ARTHROPLASTY      right    TOTAL NEPHRECTOMY      right     Family History   Problem Relation Age of Onset    Stroke Mother     Other Mother         phlebitis     Past Medical History:   Diagnosis Date    Arthritis     Cystitis     Depression     DVT of lower limb, acute ([facility] [address] 3/15/2013    Hernia     HIGH CHOLESTEROL     Hypertension     Kidney stones     Sinus infection     Superficial phlebitis of leg

## 2020-09-23 ENCOUNTER — OFFICE VISIT (OUTPATIENT)
Dept: PRIMARY CARE CLINIC | Age: 80
End: 2020-09-23
Payer: MEDICARE

## 2020-09-23 VITALS
DIASTOLIC BLOOD PRESSURE: 80 MMHG | OXYGEN SATURATION: 95 % | BODY MASS INDEX: 29.27 KG/M2 | WEIGHT: 204 LBS | HEART RATE: 87 BPM | SYSTOLIC BLOOD PRESSURE: 130 MMHG | TEMPERATURE: 98.4 F

## 2020-09-23 PROBLEM — R05.9 COUGH: Status: ACTIVE | Noted: 2020-09-23

## 2020-09-23 PROBLEM — R06.09 DYSPNEA ON EXERTION: Status: ACTIVE | Noted: 2020-09-23

## 2020-09-23 PROCEDURE — 93000 ELECTROCARDIOGRAM COMPLETE: CPT | Performed by: FAMILY MEDICINE

## 2020-09-23 PROCEDURE — 99214 OFFICE O/P EST MOD 30 MIN: CPT | Performed by: FAMILY MEDICINE

## 2020-09-23 RX ORDER — SIMVASTATIN 40 MG
40 TABLET ORAL NIGHTLY
Qty: 90 TABLET | Refills: 3 | Status: SHIPPED
Start: 2020-09-23 | End: 2021-02-19 | Stop reason: SDUPTHER

## 2020-09-23 RX ORDER — AZITHROMYCIN 250 MG/1
TABLET, FILM COATED ORAL
Qty: 1 PACKET | Refills: 0 | Status: SHIPPED
Start: 2020-09-23 | End: 2020-10-28 | Stop reason: CLARIF

## 2020-09-23 ASSESSMENT — ENCOUNTER SYMPTOMS
GASTROINTESTINAL NEGATIVE: 1
EYES NEGATIVE: 1
ALLERGIC/IMMUNOLOGIC NEGATIVE: 1
COUGH: 1

## 2020-09-23 NOTE — PROGRESS NOTES
20     Edelmira Carreno    : 1940 Sex: female   Age: 68 y.o. Chief Complaint   Patient presents with    Hypertension    Hyperlipidemia    Swelling     patient concerned about swelling in neck?  Dizziness       Prior to Admission medications    Medication Sig Start Date End Date Taking? Authorizing Provider   simvastatin (ZOCOR) 40 MG tablet Take 1 tablet by mouth nightly 20  Yes Mickey Adams, DO   Handicap Placard MISC by Does not apply route Duration 5 years 20  Yes Katie Guillory, DO   azithromycin (ZITHROMAX Z-MEHDI) 250 MG tablet 2 today  Then 1 qd  4 days 20  Yes Katie Guillory, DO   DULoxetine (CYMBALTA) 60 MG extended release capsule Take 1 capsule by mouth daily 20  Yes Katie Guillory, DO   meloxicam (MOBIC) 15 MG tablet Take 1 tablet by mouth daily 20  Yes Katie Guillory, DO   metoprolol tartrate (LOPRESSOR) 25 MG tablet Take 1 tablet by mouth daily 12/10/19  Yes Katie Guillory, DO   Psyllium (VEGETABLE LAXATIVE PO) Take by mouth   Yes Historical Provider, MD   docusate sodium (COLACE) 100 MG capsule Take 100 mg by mouth 11  Yes Historical Provider, MD   Multiple Vitamins-Minerals (ICAPS PO) Take  by mouth. Yes Historical Provider, MD   hydrochlorothiazide (HYDRODIURIL) 25 MG tablet  14  Yes Historical Provider, MD   timolol (TIMOPTIC) 0.5 % ophthalmic solution  14  Yes Historical Provider, MD          HPI: Patient evaluated today degenerative disc disease lumbar spine hypertensive disease fibromyalgia syndrome hyperlipidemia mild cough and fatigue complaints today of some dyspnea on exertion. EKG assessed sinus rhythm no acute change. Chest x-ray will be completed and followed up. Placed on a Z-Mehdi for the respiratory and then will follow-up if persistent or worsening symptoms. Review of Systems   Constitutional: Positive for fatigue. HENT: Negative. Eyes: Negative.     Respiratory: Positive for cough. Gastrointestinal: Negative. Endocrine: Negative. Genitourinary: Negative. Musculoskeletal: Negative. Skin: Negative. Allergic/Immunologic: Negative. Neurological: Negative. Hematological: Negative. Psychiatric/Behavioral: Negative. Systems review overall good aside from fatigue as noted mild cough and congestion. Chest x-ray will be completed.         Current Outpatient Medications:     simvastatin (ZOCOR) 40 MG tablet, Take 1 tablet by mouth nightly, Disp: 90 tablet, Rfl: 3    Handicap Placard MISC, by Does not apply route Duration 5 years, Disp: 1 each, Rfl: 0    azithromycin (ZITHROMAX Z-NARENDRA) 250 MG tablet, 2 today  Then 1 qd  4 days, Disp: 1 packet, Rfl: 0    DULoxetine (CYMBALTA) 60 MG extended release capsule, Take 1 capsule by mouth daily, Disp: 30 capsule, Rfl: 5    meloxicam (MOBIC) 15 MG tablet, Take 1 tablet by mouth daily, Disp: 90 tablet, Rfl: 3    metoprolol tartrate (LOPRESSOR) 25 MG tablet, Take 1 tablet by mouth daily, Disp: 90 tablet, Rfl: 3    Psyllium (VEGETABLE LAXATIVE PO), Take by mouth, Disp: , Rfl:     docusate sodium (COLACE) 100 MG capsule, Take 100 mg by mouth, Disp: , Rfl:     Multiple Vitamins-Minerals (ICAPS PO), Take  by mouth., Disp: , Rfl:     hydrochlorothiazide (HYDRODIURIL) 25 MG tablet, , Disp: , Rfl:     timolol (TIMOPTIC) 0.5 % ophthalmic solution, , Disp: , Rfl:     Allergies   Allergen Reactions    Penicillins        Social History     Tobacco Use    Smoking status: Never Smoker    Smokeless tobacco: Never Used   Substance Use Topics    Alcohol use: Never     Frequency: Never    Drug use: Never      Past Surgical History:   Procedure Laterality Date    APPENDECTOMY      BLADDER STONE REMOVAL      COLONOSCOPY      DILATION AND CURETTAGE OF UTERUS      HERNIA REPAIR      TOTAL KNEE ARTHROPLASTY      right    TOTAL NEPHRECTOMY      right     Family History   Problem Relation Age of Onset    Stroke Mother    [place] Other Mother         phlebitis     Past Medical History:   Diagnosis Date    Arthritis     Cystitis     Depression     DVT of lower limb, acute ([address] 3/15/2013    Hernia     HIGH CHOLESTEROL     Hypertension     Kidney stones     Sinus infection     Superficial phlebitis of leg 10/30/2014       Vitals:    09/23/20 1538   BP: 130/80   Pulse: 87   Temp: 98.4 °F (36.9 °C)   SpO2: 95%   Weight: 204 lb (92.5 kg)     BP Readings from Last 3 Encounters:   09/23/20 130/80   06/18/20 132/84   03/12/20 118/70        Physical Exam  Vitals signs and nursing note reviewed. Constitutional:       Appearance: She is well-developed. HENT:      Head: Normocephalic. Right Ear: External ear normal.      Left Ear: External ear normal.      Nose: Nose normal.   Eyes:      Conjunctiva/sclera: Conjunctivae normal.      Pupils: Pupils are equal, round, and reactive to light. Neck:      Musculoskeletal: Normal range of motion and neck supple. Cardiovascular:      Rate and Rhythm: Normal rate. Pulmonary:      Breath sounds: Normal breath sounds. Abdominal:      General: Bowel sounds are normal.      Palpations: Abdomen is soft. Musculoskeletal: Normal range of motion. Skin:     General: Skin is warm and dry. Neurological:      Mental Status: She is alert and oriented to person, place, and time. Psychiatric:         Behavior: Behavior normal.     Present vitals physical examination stable. We will maintain present medications care. Reassessment next 4 weeks and certainly sooner if problems. Blood work will be completed today. Plan Per Assessment:  J Carlos Fan was seen today for hypertension, hyperlipidemia, swelling and dizziness. Diagnoses and all orders for this visit:    Dyspnea on exertion  -     XR CHEST (2 VW); Future  -     EKG 12 lead;  Future  -     EKG 12 lead    Disc disease, degenerative, lumbar or lumbosacral  -     Handicap Placard MISC; by Does not apply route Duration 5

## 2020-09-24 ENCOUNTER — HOSPITAL ENCOUNTER (OUTPATIENT)
Age: 80
Discharge: HOME | End: 2020-09-24
Payer: MEDICARE

## 2020-09-24 LAB
ALBUMIN SERPL-MCNC: 4 G/DL (ref 3.5–5.2)
ALP BLD-CCNC: 37 U/L (ref 35–104)
ALT SERPL-CCNC: 13 U/L (ref 0–32)
ANION GAP SERPL CALCULATED.3IONS-SCNC: 10 MMOL/L (ref 7–16)
AST SERPL-CCNC: 17 U/L (ref 0–31)
BASOPHILS ABSOLUTE: 0.02 E9/L (ref 0–0.2)
BASOPHILS RELATIVE PERCENT: 0.4 % (ref 0–2)
BILIRUB SERPL-MCNC: 0.5 MG/DL (ref 0–1.2)
BUN BLDV-MCNC: 22 MG/DL (ref 8–23)
CALCIUM SERPL-MCNC: 10.3 MG/DL (ref 8.6–10.2)
CHLORIDE BLD-SCNC: 104 MMOL/L (ref 98–107)
CHOLESTEROL, TOTAL: 164 MG/DL (ref 0–199)
CO2: 28 MMOL/L (ref 22–29)
CREAT SERPL-MCNC: 1.1 MG/DL (ref 0.5–1)
EOSINOPHILS ABSOLUTE: 0.13 E9/L (ref 0.05–0.5)
EOSINOPHILS RELATIVE PERCENT: 2.7 % (ref 0–6)
GFR AFRICAN AMERICAN: 58
GFR NON-AFRICAN AMERICAN: 48 ML/MIN/1.73
GLUCOSE BLD-MCNC: 102 MG/DL (ref 74–99)
HBA1C MFR BLD: 5.7 % (ref 4–5.6)
HCT VFR BLD CALC: 44.5 % (ref 34–48)
HDLC SERPL-MCNC: 40 MG/DL
HEMOGLOBIN: 14.7 G/DL (ref 11.5–15.5)
IMMATURE GRANULOCYTES #: 0.01 E9/L
IMMATURE GRANULOCYTES %: 0.2 % (ref 0–5)
LDL CHOLESTEROL CALCULATED: 80 MG/DL (ref 0–99)
LYMPHOCYTES ABSOLUTE: 1.48 E9/L (ref 1.5–4)
LYMPHOCYTES RELATIVE PERCENT: 30.9 % (ref 20–42)
MCH RBC QN AUTO: 31.6 PG (ref 26–35)
MCHC RBC AUTO-ENTMCNC: 33 % (ref 32–34.5)
MCV RBC AUTO: 95.7 FL (ref 80–99.9)
MONOCYTES ABSOLUTE: 0.43 E9/L (ref 0.1–0.95)
MONOCYTES RELATIVE PERCENT: 9 % (ref 2–12)
NEUTROPHILS ABSOLUTE: 2.72 E9/L (ref 1.8–7.3)
NEUTROPHILS RELATIVE PERCENT: 56.8 % (ref 43–80)
PDW BLD-RTO: 12.6 FL (ref 11.5–15)
PLATELET # BLD: 208 E9/L (ref 130–450)
PMV BLD AUTO: 9.2 FL (ref 7–12)
POTASSIUM SERPL-SCNC: 4.4 MMOL/L (ref 3.5–5)
RBC # BLD: 4.65 E12/L (ref 3.5–5.5)
SODIUM BLD-SCNC: 142 MMOL/L (ref 132–146)
T4 TOTAL: 8.1 MCG/DL (ref 4.5–11.7)
TOTAL PROTEIN: 7 G/DL (ref 6.4–8.3)
TRIGL SERPL-MCNC: 218 MG/DL (ref 0–149)
TSH SERPL DL<=0.05 MIU/L-ACNC: 1.48 UIU/ML (ref 0.27–4.2)
VLDLC SERPL CALC-MCNC: 44 MG/DL
WBC # BLD: 4.8 E9/L (ref 4.5–11.5)

## 2020-09-24 PROCEDURE — 85025 COMPLETE CBC W/AUTO DIFF WBC: CPT

## 2020-09-24 PROCEDURE — 80053 COMPREHEN METABOLIC PANEL: CPT

## 2020-09-24 PROCEDURE — 83036 HEMOGLOBIN GLYCOSYLATED A1C: CPT

## 2020-09-24 PROCEDURE — 84436 ASSAY OF TOTAL THYROXINE: CPT

## 2020-09-24 PROCEDURE — 80061 LIPID PANEL: CPT

## 2020-09-24 PROCEDURE — 84443 ASSAY THYROID STIM HORMONE: CPT

## 2020-09-24 PROCEDURE — 36415 COLL VENOUS BLD VENIPUNCTURE: CPT

## 2020-10-23 PROBLEM — R05.9 COUGH: Status: RESOLVED | Noted: 2020-09-23 | Resolved: 2020-10-23

## 2020-10-28 ENCOUNTER — OFFICE VISIT (OUTPATIENT)
Dept: PRIMARY CARE CLINIC | Age: 80
End: 2020-10-28
Payer: MEDICARE

## 2020-10-28 VITALS
DIASTOLIC BLOOD PRESSURE: 76 MMHG | HEART RATE: 75 BPM | WEIGHT: 206 LBS | BODY MASS INDEX: 29.56 KG/M2 | OXYGEN SATURATION: 96 % | SYSTOLIC BLOOD PRESSURE: 120 MMHG | TEMPERATURE: 97.4 F

## 2020-10-28 PROBLEM — J01.00 ACUTE NON-RECURRENT MAXILLARY SINUSITIS: Status: ACTIVE | Noted: 2020-10-28

## 2020-10-28 PROCEDURE — 99214 OFFICE O/P EST MOD 30 MIN: CPT | Performed by: FAMILY MEDICINE

## 2020-10-28 RX ORDER — CEPHALEXIN 250 MG/1
250 CAPSULE ORAL 2 TIMES DAILY
Qty: 20 CAPSULE | Refills: 0 | Status: SHIPPED
Start: 2020-10-28 | End: 2020-11-19

## 2020-10-28 RX ORDER — PREDNISONE 1 MG/1
TABLET ORAL
Qty: 30 TABLET | Refills: 0 | Status: SHIPPED
Start: 2020-10-28 | End: 2020-11-19

## 2020-10-28 ASSESSMENT — ENCOUNTER SYMPTOMS
RESPIRATORY NEGATIVE: 1
GASTROINTESTINAL NEGATIVE: 1
ALLERGIC/IMMUNOLOGIC NEGATIVE: 1
SINUS PRESSURE: 1
EYES NEGATIVE: 1
SINUS PAIN: 1

## 2020-10-28 NOTE — PROGRESS NOTES
Constitutional: Negative. HENT: Positive for congestion, sinus pressure and sinus pain. Eyes: Negative. Respiratory: Negative. Gastrointestinal: Negative. Endocrine: Negative. Genitourinary: Negative. Musculoskeletal: Negative. Skin: Negative. Allergic/Immunologic: Negative. Neurological: Negative. Hematological: Negative. Psychiatric/Behavioral: Negative.                Current Outpatient Medications:     cephALEXin (KEFLEX) 250 MG capsule, Take 1 capsule by mouth 2 times daily, Disp: 20 capsule, Rfl: 0    predniSONE (DELTASONE) 5 MG tablet, 4 tablets daily for 3 days then 3 tablets daily for 3 days then 2 tablets daily for 3 days then 1 tablet daily for 3 days, Disp: 30 tablet, Rfl: 0    simvastatin (ZOCOR) 40 MG tablet, Take 1 tablet by mouth nightly, Disp: 90 tablet, Rfl: 3    Handicap Placard MISC, by Does not apply route Duration 5 years, Disp: 1 each, Rfl: 0    DULoxetine (CYMBALTA) 60 MG extended release capsule, Take 1 capsule by mouth daily, Disp: 30 capsule, Rfl: 5    meloxicam (MOBIC) 15 MG tablet, Take 1 tablet by mouth daily, Disp: 90 tablet, Rfl: 3    metoprolol tartrate (LOPRESSOR) 25 MG tablet, Take 1 tablet by mouth daily, Disp: 90 tablet, Rfl: 3    Psyllium (VEGETABLE LAXATIVE PO), Take by mouth, Disp: , Rfl:     docusate sodium (COLACE) 100 MG capsule, Take 100 mg by mouth, Disp: , Rfl:     Multiple Vitamins-Minerals (ICAPS PO), Take  by mouth., Disp: , Rfl:     hydrochlorothiazide (HYDRODIURIL) 25 MG tablet, , Disp: , Rfl:     timolol (TIMOPTIC) 0.5 % ophthalmic solution, , Disp: , Rfl:     Allergies   Allergen Reactions    Penicillins        Social History     Tobacco Use    Smoking status: Never Smoker    Smokeless tobacco: Never Used   Substance Use Topics    Alcohol use: Never     Frequency: Never    Drug use: Never      Past Surgical History:   Procedure Laterality Date    APPENDECTOMY      BLADDER STONE REMOVAL      COLONOSCOPY      eyes, mild stage, unspecified open-angle glaucoma type    Hyperlipidemia, unspecified hyperlipidemia type    Impaired fasting glucose    Acute non-recurrent maxillary sinusitis    Other orders  -     cephALEXin (KEFLEX) 250 MG capsule; Take 1 capsule by mouth 2 times daily  -     predniSONE (DELTASONE) 5 MG tablet; 4 tablets daily for 3 days then 3 tablets daily for 3 days then 2 tablets daily for 3 days then 1 tablet daily for 3 days            Return in about 2 weeks (around 11/11/2020). Gerald Ortiz,     Note was generated with the assistance of voice recognition software. Document was reviewed however may contain grammatical errors.

## 2020-11-19 ENCOUNTER — OFFICE VISIT (OUTPATIENT)
Dept: PRIMARY CARE CLINIC | Age: 80
End: 2020-11-19
Payer: MEDICARE

## 2020-11-19 VITALS
OXYGEN SATURATION: 98 % | TEMPERATURE: 97.6 F | HEART RATE: 135 BPM | RESPIRATION RATE: 16 BRPM | SYSTOLIC BLOOD PRESSURE: 126 MMHG | DIASTOLIC BLOOD PRESSURE: 80 MMHG | HEIGHT: 70 IN | BODY MASS INDEX: 29.49 KG/M2 | WEIGHT: 206 LBS

## 2020-11-19 PROBLEM — R00.0 TACHYCARDIA: Status: ACTIVE | Noted: 2020-11-19

## 2020-11-19 PROBLEM — J32.9 CHRONIC SINUSITIS: Status: ACTIVE | Noted: 2020-11-19

## 2020-11-19 PROCEDURE — 99213 OFFICE O/P EST LOW 20 MIN: CPT | Performed by: FAMILY MEDICINE

## 2020-11-19 PROCEDURE — 93010 ELECTROCARDIOGRAM REPORT: CPT | Performed by: FAMILY MEDICINE

## 2020-11-19 PROCEDURE — 93000 ELECTROCARDIOGRAM COMPLETE: CPT | Performed by: FAMILY MEDICINE

## 2020-11-19 ASSESSMENT — ENCOUNTER SYMPTOMS
EYES NEGATIVE: 1
RESPIRATORY NEGATIVE: 1
SINUS PRESSURE: 1
SINUS PAIN: 1
ALLERGIC/IMMUNOLOGIC NEGATIVE: 1
GASTROINTESTINAL NEGATIVE: 1

## 2020-11-19 NOTE — PROGRESS NOTES
10/28/20     Madelin Patches    : 1940 Sex: female   Age: 68 y.o. Chief Complaint   Patient presents with    1 Month Follow-Up    Hypertension    Dizziness       Prior to Admission medications    Medication Sig Start Date End Date Taking? Authorizing Provider   simvastatin (ZOCOR) 40 MG tablet Take 1 tablet by mouth nightly 20  Yes Gerald Ortiz, DO   Handicap Placard MISC by Does not apply route Duration 5 years 20  Yes Gerald Ortiz, DO   DULoxetine (CYMBALTA) 60 MG extended release capsule Take 1 capsule by mouth daily 20  Yes Mauricio Guillory, DO   meloxicam (MOBIC) 15 MG tablet Take 1 tablet by mouth daily 20  Yes Mauricio Guillory, DO   metoprolol tartrate (LOPRESSOR) 25 MG tablet Take 1 tablet by mouth daily 12/10/19  Yes Mauricio Guillory,    Psyllium (VEGETABLE LAXATIVE PO) Take by mouth   Yes Historical Provider, MD   docusate sodium (COLACE) 100 MG capsule Take 100 mg by mouth 11  Yes Historical Provider, MD   Multiple Vitamins-Minerals (ICAPS PO) Take  by mouth. Yes Historical Provider, MD   hydrochlorothiazide (HYDRODIURIL) 25 MG tablet  14  Yes Historical Provider, MD   timolol (TIMOPTIC) 0.5 % ophthalmic solution  14  Yes Historical Provider, MD          HPI: Patient evaluated today hypertension glaucoma hyperlipidemia impaired fasting glucose all of which is been fair control. Medications well-tolerated. Continues to complain today of dizziness and fatigue. Also significantly elevated today however she states that she did not take her medicine. She is on a short acting metoprolol which she takes in the morning denies any other issues. Review of Systems   Constitutional: Positive for fatigue. HENT: Positive for congestion, sinus pressure and sinus pain. Eyes: Negative. Respiratory: Negative. Gastrointestinal: Negative. Endocrine: Negative. Genitourinary: Negative. Musculoskeletal: Negative.     Skin: Negative. Allergic/Immunologic: Negative. Neurological: Positive for dizziness. Hematological: Negative. Psychiatric/Behavioral: Negative.                Current Outpatient Medications:     simvastatin (ZOCOR) 40 MG tablet, Take 1 tablet by mouth nightly, Disp: 90 tablet, Rfl: 3    Handicap Placard MISC, by Does not apply route Duration 5 years, Disp: 1 each, Rfl: 0    DULoxetine (CYMBALTA) 60 MG extended release capsule, Take 1 capsule by mouth daily, Disp: 30 capsule, Rfl: 5    meloxicam (MOBIC) 15 MG tablet, Take 1 tablet by mouth daily, Disp: 90 tablet, Rfl: 3    metoprolol tartrate (LOPRESSOR) 25 MG tablet, Take 1 tablet by mouth daily, Disp: 90 tablet, Rfl: 3    Psyllium (VEGETABLE LAXATIVE PO), Take by mouth, Disp: , Rfl:     docusate sodium (COLACE) 100 MG capsule, Take 100 mg by mouth, Disp: , Rfl:     Multiple Vitamins-Minerals (ICAPS PO), Take  by mouth., Disp: , Rfl:     hydrochlorothiazide (HYDRODIURIL) 25 MG tablet, , Disp: , Rfl:     timolol (TIMOPTIC) 0.5 % ophthalmic solution, , Disp: , Rfl:     Allergies   Allergen Reactions    Penicillins        Social History     Tobacco Use    Smoking status: Never Smoker    Smokeless tobacco: Never Used   Substance Use Topics    Alcohol use: Never     Frequency: Never    Drug use: Never      Past Surgical History:   Procedure Laterality Date    APPENDECTOMY      BLADDER STONE REMOVAL      COLONOSCOPY      DILATION AND CURETTAGE OF UTERUS      HERNIA REPAIR      TOTAL KNEE ARTHROPLASTY      right    TOTAL NEPHRECTOMY      right     Family History   Problem Relation Age of Onset    Stroke Mother     Other Mother         phlebitis     Past Medical History:   Diagnosis Date    Arthritis     Cystitis     Depression     DVT of lower limb, acute ([facility] [address] 3/15/2013    Hernia     HIGH CHOLESTEROL     Hypertension     Kidney stones     Sinus infection     Superficial phlebitis of leg 10/30/2014       Vitals:    11/19/20 1047   BP: 126/80   Pulse: 135   Resp: 16   Temp: 97.6 °F (36.4 °C)   SpO2: 98%   Weight: 206 lb (93.4 kg)   Height: 5' 10\" (1.778 m)     BP Readings from Last 3 Encounters:   11/19/20 126/80   10/28/20 120/76   09/23/20 130/80    136/80  Physical Exam  Vitals signs and nursing note reviewed. Constitutional:       Appearance: She is well-developed. HENT:      Head: Normocephalic. Right Ear: External ear normal. A middle ear effusion is present. Left Ear: External ear normal. A middle ear effusion is present. Nose: Nose normal.   Eyes:      Conjunctiva/sclera: Conjunctivae normal.      Pupils: Pupils are equal, round, and reactive to light. Neck:      Musculoskeletal: Normal range of motion and neck supple. Cardiovascular:      Rate and Rhythm: Tachycardia present. Pulmonary:      Breath sounds: Normal breath sounds. Abdominal:      General: Bowel sounds are normal.      Palpations: Abdomen is soft. Musculoskeletal: Normal range of motion. Skin:     General: Skin is warm and dry. Neurological:      Mental Status: She is alert and oriented to person, place, and time. Psychiatric:         Behavior: Behavior normal.        EKG  Interpretation reveals ventricular rate of 121, NC interval 148, QTC of 418 with QRS duration of 86. Rhythm is sinus tachycardia with leftward axis. Q wave or deep S waves noted in lead III with T wave flattening. No reciprocal changes noted. No acute ST segment or T wave changes at this time. Plan Per Assessment:  Sam flannery was seen today for 1 month follow-up, hypertension and dizziness. Diagnoses and all orders for this visit:    Tachycardia  -     EKG 12 lead; Future  -     EKG 12 lead    Chronic sinusitis, unspecified location  -     [facility] DO, Ear, Nose, Throat, Maddy Buoy    This time I believe that the tachycardia is secondary to the patient's skipping of today's morning medication of metoprolol.   Advised her to take additional she got back home. Advised her also to check her pulse rate for the next several days. If still elevated despite continued medication administration will either adjust medication or refer him to cardiology. Referral to otolaryngology today. No follow-ups on file. Jorge Luis Shelton,     Note was generated with the assistance of voice recognition software. Document was reviewed however may contain grammatical errors.

## 2020-12-01 ENCOUNTER — HOSPITAL ENCOUNTER (OUTPATIENT)
Age: 80
Discharge: HOME | End: 2020-12-01
Payer: MEDICARE

## 2020-12-01 LAB
BUN BLDV-MCNC: 17 MG/DL (ref 8–23)
CREAT SERPL-MCNC: 1.2 MG/DL (ref 0.5–1)
GFR AFRICAN AMERICAN: 52
GFR NON-AFRICAN AMERICAN: 43 ML/MIN/1.73

## 2020-12-01 PROCEDURE — 84100 ASSAY OF PHOSPHORUS: CPT

## 2020-12-01 PROCEDURE — 84520 ASSAY OF UREA NITROGEN: CPT

## 2020-12-01 PROCEDURE — 36415 COLL VENOUS BLD VENIPUNCTURE: CPT

## 2020-12-01 PROCEDURE — 82565 ASSAY OF CREATININE: CPT

## 2020-12-01 PROCEDURE — 84550 ASSAY OF BLOOD/URIC ACID: CPT

## 2020-12-01 PROCEDURE — 82310 ASSAY OF CALCIUM: CPT

## 2020-12-02 LAB
CALCIUM SERPL-MCNC: 10.5 MG/DL (ref 8.6–10.2)
PHOSPHORUS: 3.5 MG/DL (ref 2.5–4.5)
URIC ACID, SERUM: 5.6 MG/DL (ref 2.4–5.7)

## 2020-12-15 ENCOUNTER — APPOINTMENT (OUTPATIENT)
Dept: CT IMAGING | Age: 80
DRG: 166 | End: 2020-12-15
Payer: MEDICARE

## 2020-12-15 ENCOUNTER — APPOINTMENT (OUTPATIENT)
Dept: ULTRASOUND IMAGING | Age: 80
DRG: 166 | End: 2020-12-15
Payer: MEDICARE

## 2020-12-15 ENCOUNTER — HOSPITAL ENCOUNTER (INPATIENT)
Age: 80
LOS: 8 days | Discharge: HOME | DRG: 166 | End: 2020-12-23
Attending: EMERGENCY MEDICINE | Admitting: INTERNAL MEDICINE
Payer: MEDICARE

## 2020-12-15 ENCOUNTER — APPOINTMENT (OUTPATIENT)
Dept: GENERAL RADIOLOGY | Age: 80
DRG: 166 | End: 2020-12-15
Payer: MEDICARE

## 2020-12-15 PROBLEM — I26.99 PE (PULMONARY THROMBOEMBOLISM) (HCC): Status: ACTIVE | Noted: 2020-12-15

## 2020-12-15 LAB
ADENOVIRUS BY PCR: NOT DETECTED
ALBUMIN SERPL-MCNC: 3.8 G/DL (ref 3.5–5.2)
ALP BLD-CCNC: 36 U/L (ref 35–104)
ALT SERPL-CCNC: 13 U/L (ref 0–32)
ANION GAP SERPL CALCULATED.3IONS-SCNC: 12 MMOL/L (ref 7–16)
APTT: 28.7 SEC (ref 24.5–35.1)
AST SERPL-CCNC: 19 U/L (ref 0–31)
BASOPHILS ABSOLUTE: 0.03 E9/L (ref 0–0.2)
BASOPHILS ABSOLUTE: 0.03 E9/L (ref 0–0.2)
BASOPHILS RELATIVE PERCENT: 0.3 % (ref 0–2)
BASOPHILS RELATIVE PERCENT: 0.4 % (ref 0–2)
BILIRUB SERPL-MCNC: 1 MG/DL (ref 0–1.2)
BORDETELLA PARAPERTUSSIS BY PCR: NOT DETECTED
BORDETELLA PERTUSSIS BY PCR: NOT DETECTED
BUN BLDV-MCNC: 20 MG/DL (ref 8–23)
CALCIUM SERPL-MCNC: 10.2 MG/DL (ref 8.6–10.2)
CHLAMYDOPHILIA PNEUMONIAE BY PCR: NOT DETECTED
CHLORIDE BLD-SCNC: 102 MMOL/L (ref 98–107)
CHLORIDE URINE RANDOM: 25 MMOL/L
CO2: 24 MMOL/L (ref 22–29)
CORONAVIRUS 229E BY PCR: NOT DETECTED
CORONAVIRUS HKU1 BY PCR: NOT DETECTED
CORONAVIRUS NL63 BY PCR: NOT DETECTED
CORONAVIRUS OC43 BY PCR: NOT DETECTED
CREAT SERPL-MCNC: 1.2 MG/DL (ref 0.5–1)
EKG ATRIAL RATE: 92 BPM
EKG P AXIS: 73 DEGREES
EKG P-R INTERVAL: 168 MS
EKG Q-T INTERVAL: 388 MS
EKG QRS DURATION: 88 MS
EKG QTC CALCULATION (BAZETT): 479 MS
EKG R AXIS: 30 DEGREES
EKG T AXIS: 48 DEGREES
EKG VENTRICULAR RATE: 92 BPM
EOSINOPHILS ABSOLUTE: 0.06 E9/L (ref 0.05–0.5)
EOSINOPHILS ABSOLUTE: 0.13 E9/L (ref 0.05–0.5)
EOSINOPHILS RELATIVE PERCENT: 0.6 % (ref 0–6)
EOSINOPHILS RELATIVE PERCENT: 1.6 % (ref 0–6)
GFR AFRICAN AMERICAN: 52
GFR NON-AFRICAN AMERICAN: 43 ML/MIN/1.73
GLUCOSE BLD-MCNC: 123 MG/DL (ref 74–99)
HCT VFR BLD CALC: 42.6 % (ref 34–48)
HCT VFR BLD CALC: 42.8 % (ref 34–48)
HEMOGLOBIN: 13.5 G/DL (ref 11.5–15.5)
HEMOGLOBIN: 14.1 G/DL (ref 11.5–15.5)
HUMAN METAPNEUMOVIRUS BY PCR: NOT DETECTED
HUMAN RHINOVIRUS/ENTEROVIRUS BY PCR: NOT DETECTED
IMMATURE GRANULOCYTES #: 0.02 E9/L
IMMATURE GRANULOCYTES #: 0.04 E9/L
IMMATURE GRANULOCYTES %: 0.2 % (ref 0–5)
IMMATURE GRANULOCYTES %: 0.4 % (ref 0–5)
INFLUENZA A BY PCR: NOT DETECTED
INFLUENZA B BY PCR: NOT DETECTED
INR BLD: 1.2
INR BLD: 1.3
LACTIC ACID: 2.4 MMOL/L (ref 0.5–2.2)
LYMPHOCYTES ABSOLUTE: 0.88 E9/L (ref 1.5–4)
LYMPHOCYTES ABSOLUTE: 1.55 E9/L (ref 1.5–4)
LYMPHOCYTES RELATIVE PERCENT: 18.5 % (ref 20–42)
LYMPHOCYTES RELATIVE PERCENT: 9.2 % (ref 20–42)
MCH RBC QN AUTO: 30.8 PG (ref 26–35)
MCH RBC QN AUTO: 31.7 PG (ref 26–35)
MCHC RBC AUTO-ENTMCNC: 31.5 % (ref 32–34.5)
MCHC RBC AUTO-ENTMCNC: 33.1 % (ref 32–34.5)
MCV RBC AUTO: 95.7 FL (ref 80–99.9)
MCV RBC AUTO: 97.5 FL (ref 80–99.9)
METER GLUCOSE: 121 MG/DL (ref 74–99)
MONOCYTES ABSOLUTE: 0.68 E9/L (ref 0.1–0.95)
MONOCYTES ABSOLUTE: 0.73 E9/L (ref 0.1–0.95)
MONOCYTES RELATIVE PERCENT: 7.7 % (ref 2–12)
MONOCYTES RELATIVE PERCENT: 8.1 % (ref 2–12)
MYCOPLASMA PNEUMONIAE BY PCR: NOT DETECTED
NEUTROPHILS ABSOLUTE: 5.97 E9/L (ref 1.8–7.3)
NEUTROPHILS ABSOLUTE: 7.78 E9/L (ref 1.8–7.3)
NEUTROPHILS RELATIVE PERCENT: 71.2 % (ref 43–80)
NEUTROPHILS RELATIVE PERCENT: 81.8 % (ref 43–80)
PARAINFLUENZA VIRUS 1 BY PCR: NOT DETECTED
PARAINFLUENZA VIRUS 2 BY PCR: NOT DETECTED
PARAINFLUENZA VIRUS 3 BY PCR: NOT DETECTED
PARAINFLUENZA VIRUS 4 BY PCR: NOT DETECTED
PDW BLD-RTO: 13.4 FL (ref 11.5–15)
PDW BLD-RTO: 13.7 FL (ref 11.5–15)
PLATELET # BLD: 158 E9/L (ref 130–450)
PLATELET # BLD: 172 E9/L (ref 130–450)
PMV BLD AUTO: 10.5 FL (ref 7–12)
PMV BLD AUTO: 10.8 FL (ref 7–12)
POTASSIUM REFLEX MAGNESIUM: 4.4 MMOL/L (ref 3.5–5)
POTASSIUM, UR: 60.2 MMOL/L
PROTHROMBIN TIME: 13.3 SEC (ref 9.3–12.4)
PROTHROMBIN TIME: 14.1 SEC (ref 9.3–12.4)
RBC # BLD: 4.39 E12/L (ref 3.5–5.5)
RBC # BLD: 4.45 E12/L (ref 3.5–5.5)
RESPIRATORY SYNCYTIAL VIRUS BY PCR: NOT DETECTED
SARS-COV-2, PCR: NOT DETECTED
SODIUM BLD-SCNC: 138 MMOL/L (ref 132–146)
SODIUM URINE: 24 MMOL/L
TOTAL PROTEIN: 6.8 G/DL (ref 6.4–8.3)
TROPONIN: 0.01 NG/ML (ref 0–0.03)
TROPONIN: 0.03 NG/ML (ref 0–0.03)
UREA NITROGEN, UR: 705 MG/DL (ref 800–1666)
WBC # BLD: 8.4 E9/L (ref 4.5–11.5)
WBC # BLD: 9.5 E9/L (ref 4.5–11.5)

## 2020-12-15 PROCEDURE — 93970 EXTREMITY STUDY: CPT

## 2020-12-15 PROCEDURE — 80053 COMPREHEN METABOLIC PANEL: CPT

## 2020-12-15 PROCEDURE — 83880 ASSAY OF NATRIURETIC PEPTIDE: CPT

## 2020-12-15 PROCEDURE — 84484 ASSAY OF TROPONIN QUANT: CPT

## 2020-12-15 PROCEDURE — 96374 THER/PROPH/DIAG INJ IV PUSH: CPT

## 2020-12-15 PROCEDURE — 85025 COMPLETE CBC W/AUTO DIFF WBC: CPT

## 2020-12-15 PROCEDURE — 85610 PROTHROMBIN TIME: CPT

## 2020-12-15 PROCEDURE — 71045 X-RAY EXAM CHEST 1 VIEW: CPT

## 2020-12-15 PROCEDURE — 84300 ASSAY OF URINE SODIUM: CPT

## 2020-12-15 PROCEDURE — 83605 ASSAY OF LACTIC ACID: CPT

## 2020-12-15 PROCEDURE — 93005 ELECTROCARDIOGRAM TRACING: CPT | Performed by: EMERGENCY MEDICINE

## 2020-12-15 PROCEDURE — 36415 COLL VENOUS BLD VENIPUNCTURE: CPT

## 2020-12-15 PROCEDURE — 71275 CT ANGIOGRAPHY CHEST: CPT

## 2020-12-15 PROCEDURE — 83735 ASSAY OF MAGNESIUM: CPT

## 2020-12-15 PROCEDURE — 84145 PROCALCITONIN (PCT): CPT

## 2020-12-15 PROCEDURE — 85730 THROMBOPLASTIN TIME PARTIAL: CPT

## 2020-12-15 PROCEDURE — 84540 ASSAY OF URINE/UREA-N: CPT

## 2020-12-15 PROCEDURE — 2580000003 HC RX 258: Performed by: EMERGENCY MEDICINE

## 2020-12-15 PROCEDURE — 2000000000 HC ICU R&B

## 2020-12-15 PROCEDURE — 82436 ASSAY OF URINE CHLORIDE: CPT

## 2020-12-15 PROCEDURE — 6360000004 HC RX CONTRAST MEDICATION: Performed by: RADIOLOGY

## 2020-12-15 PROCEDURE — 51702 INSERT TEMP BLADDER CATH: CPT

## 2020-12-15 PROCEDURE — 84133 ASSAY OF URINE POTASSIUM: CPT

## 2020-12-15 PROCEDURE — 2580000003 HC RX 258: Performed by: INTERNAL MEDICINE

## 2020-12-15 PROCEDURE — 84100 ASSAY OF PHOSPHORUS: CPT

## 2020-12-15 PROCEDURE — 0202U NFCT DS 22 TRGT SARS-COV-2: CPT

## 2020-12-15 PROCEDURE — 6360000002 HC RX W HCPCS: Performed by: EMERGENCY MEDICINE

## 2020-12-15 PROCEDURE — 82533 TOTAL CORTISOL: CPT

## 2020-12-15 PROCEDURE — 99284 EMERGENCY DEPT VISIT MOD MDM: CPT

## 2020-12-15 PROCEDURE — 93010 ELECTROCARDIOGRAM REPORT: CPT | Performed by: INTERNAL MEDICINE

## 2020-12-15 PROCEDURE — 82962 GLUCOSE BLOOD TEST: CPT

## 2020-12-15 RX ORDER — SODIUM CHLORIDE 0.9 % (FLUSH) 0.9 %
10 SYRINGE (ML) INJECTION EVERY 12 HOURS SCHEDULED
Status: DISCONTINUED | OUTPATIENT
Start: 2020-12-15 | End: 2020-12-16 | Stop reason: SDUPTHER

## 2020-12-15 RX ORDER — 0.9 % SODIUM CHLORIDE 0.9 %
1000 INTRAVENOUS SOLUTION INTRAVENOUS ONCE
Status: COMPLETED | OUTPATIENT
Start: 2020-12-15 | End: 2020-12-15

## 2020-12-15 RX ORDER — HEPARIN SODIUM 1000 [USP'U]/ML
80 INJECTION, SOLUTION INTRAVENOUS; SUBCUTANEOUS PRN
Status: DISCONTINUED | OUTPATIENT
Start: 2020-12-15 | End: 2020-12-16

## 2020-12-15 RX ORDER — DEXTROSE MONOHYDRATE 50 MG/ML
100 INJECTION, SOLUTION INTRAVENOUS PRN
Status: DISCONTINUED | OUTPATIENT
Start: 2020-12-15 | End: 2020-12-23 | Stop reason: HOSPADM

## 2020-12-15 RX ORDER — HEPARIN SODIUM 1000 [USP'U]/ML
80 INJECTION, SOLUTION INTRAVENOUS; SUBCUTANEOUS ONCE
Status: COMPLETED | OUTPATIENT
Start: 2020-12-15 | End: 2020-12-15

## 2020-12-15 RX ORDER — ACETAMINOPHEN 325 MG/1
650 TABLET ORAL EVERY 6 HOURS PRN
Status: DISCONTINUED | OUTPATIENT
Start: 2020-12-15 | End: 2020-12-16 | Stop reason: SDUPTHER

## 2020-12-15 RX ORDER — PANTOPRAZOLE SODIUM 40 MG/10ML
40 INJECTION, POWDER, LYOPHILIZED, FOR SOLUTION INTRAVENOUS DAILY
Status: DISCONTINUED | OUTPATIENT
Start: 2020-12-16 | End: 2020-12-17

## 2020-12-15 RX ORDER — SODIUM CHLORIDE 0.9 % (FLUSH) 0.9 %
10 SYRINGE (ML) INJECTION PRN
Status: DISCONTINUED | OUTPATIENT
Start: 2020-12-15 | End: 2020-12-16 | Stop reason: SDUPTHER

## 2020-12-15 RX ORDER — ONDANSETRON 2 MG/ML
4 INJECTION INTRAMUSCULAR; INTRAVENOUS ONCE
Status: DISCONTINUED | OUTPATIENT
Start: 2020-12-15 | End: 2020-12-17

## 2020-12-15 RX ORDER — NICOTINE POLACRILEX 4 MG
15 LOZENGE BUCCAL PRN
Status: DISCONTINUED | OUTPATIENT
Start: 2020-12-15 | End: 2020-12-23 | Stop reason: HOSPADM

## 2020-12-15 RX ORDER — POLYETHYLENE GLYCOL 3350 17 G/17G
17 POWDER, FOR SOLUTION ORAL DAILY PRN
Status: DISCONTINUED | OUTPATIENT
Start: 2020-12-15 | End: 2020-12-23 | Stop reason: HOSPADM

## 2020-12-15 RX ORDER — 0.9 % SODIUM CHLORIDE 0.9 %
500 INTRAVENOUS SOLUTION INTRAVENOUS ONCE
Status: COMPLETED | OUTPATIENT
Start: 2020-12-15 | End: 2020-12-15

## 2020-12-15 RX ORDER — PROMETHAZINE HYDROCHLORIDE 25 MG/1
12.5 TABLET ORAL EVERY 6 HOURS PRN
Status: DISCONTINUED | OUTPATIENT
Start: 2020-12-15 | End: 2020-12-23 | Stop reason: HOSPADM

## 2020-12-15 RX ORDER — DEXTROSE MONOHYDRATE 25 G/50ML
12.5 INJECTION, SOLUTION INTRAVENOUS PRN
Status: DISCONTINUED | OUTPATIENT
Start: 2020-12-15 | End: 2020-12-23 | Stop reason: HOSPADM

## 2020-12-15 RX ORDER — HEPARIN SODIUM 1000 [USP'U]/ML
40 INJECTION, SOLUTION INTRAVENOUS; SUBCUTANEOUS PRN
Status: DISCONTINUED | OUTPATIENT
Start: 2020-12-15 | End: 2020-12-16

## 2020-12-15 RX ORDER — SODIUM CHLORIDE 9 MG/ML
10 INJECTION INTRAVENOUS DAILY
Status: DISCONTINUED | OUTPATIENT
Start: 2020-12-16 | End: 2020-12-17

## 2020-12-15 RX ORDER — SODIUM CHLORIDE, SODIUM LACTATE, POTASSIUM CHLORIDE, CALCIUM CHLORIDE 600; 310; 30; 20 MG/100ML; MG/100ML; MG/100ML; MG/100ML
INJECTION, SOLUTION INTRAVENOUS CONTINUOUS
Status: DISCONTINUED | OUTPATIENT
Start: 2020-12-15 | End: 2020-12-17

## 2020-12-15 RX ORDER — ACETAMINOPHEN 650 MG/1
650 SUPPOSITORY RECTAL EVERY 6 HOURS PRN
Status: DISCONTINUED | OUTPATIENT
Start: 2020-12-15 | End: 2020-12-16 | Stop reason: SDUPTHER

## 2020-12-15 RX ORDER — HEPARIN SODIUM 10000 [USP'U]/100ML
18 INJECTION, SOLUTION INTRAVENOUS CONTINUOUS
Status: DISCONTINUED | OUTPATIENT
Start: 2020-12-15 | End: 2020-12-16

## 2020-12-15 RX ORDER — ONDANSETRON 2 MG/ML
4 INJECTION INTRAMUSCULAR; INTRAVENOUS EVERY 6 HOURS PRN
Status: DISCONTINUED | OUTPATIENT
Start: 2020-12-15 | End: 2020-12-16 | Stop reason: SDUPTHER

## 2020-12-15 RX ADMIN — HEPARIN SODIUM 18 UNITS/KG/HR: 10000 INJECTION, SOLUTION INTRAVENOUS at 15:33

## 2020-12-15 RX ADMIN — HEPARIN SODIUM 7370 UNITS: 1000 INJECTION INTRAVENOUS; SUBCUTANEOUS at 15:35

## 2020-12-15 RX ADMIN — SODIUM CHLORIDE, POTASSIUM CHLORIDE, SODIUM LACTATE AND CALCIUM CHLORIDE: 600; 310; 30; 20 INJECTION, SOLUTION INTRAVENOUS at 23:18

## 2020-12-15 RX ADMIN — SODIUM CHLORIDE 1000 ML: 9 INJECTION, SOLUTION INTRAVENOUS at 16:22

## 2020-12-15 RX ADMIN — IOPAMIDOL 75 ML: 755 INJECTION, SOLUTION INTRAVENOUS at 14:59

## 2020-12-15 RX ADMIN — SODIUM CHLORIDE 500 ML: 9 INJECTION, SOLUTION INTRAVENOUS at 13:45

## 2020-12-15 ASSESSMENT — PAIN SCALES - GENERAL: PAINLEVEL_OUTOF10: 0

## 2020-12-15 NOTE — ED PROVIDER NOTES
HPI:  12/15/20,   Time: 1:04 PM CECI Guajardo is a 68 y.o. female presenting to the ED for dizzy/lightheaded, beginning 3 days ago. The complaint has been persistent, moderate in severity, and worsened by movement of exertion/walking. She denies fever, chills, cough, congestion, chest pain, shortness of breath, vomiting or diarrhea, abdominal pain, focal paresthesias, focal weakness, dysuria or hematuria. She also c/o right leg pain and swelling. Review of Systems:   Pertinent positives and negatives are stated within HPI, all other systems reviewed and are negative.        --------------------------------------------- PAST HISTORY ---------------------------------------------  Past Medical History:  has a past medical history of Arthritis, Cystitis, Depression, DVT of lower limb, acute ([address] Hernia, HIGH CHOLESTEROL, Hypertension, Kidney stones, Sinus infection, and Superficial phlebitis of leg. Past Surgical History:  has a past surgical history that includes Bladder stone removal; Appendectomy; hernia repair; Dilation and curettage of uterus; Total knee arthroplasty; total nephrectomy; and Colonoscopy. Social History:  reports that she has never smoked. She has never used smokeless tobacco. She reports that she does not drink alcohol or use drugs. Family History: family history includes Other in her mother; Stroke in her mother. The patients home medications have been reviewed.     Allergies: Penicillins        ---------------------------------------------------PHYSICAL EXAM--------------------------------------    Constitutional/General: Alert and oriented x3, well appearing, non toxic in NAD  Head: Normocephalic and atraumatic  Eyes: PERRL, EOMI, conjunctive normal, sclera non icteric  Mouth: Oropharynx clear, handling secretions, no trismus, no asymmetry of the posterior oropharynx or uvular edema Neck: Supple, full ROM, non tender to palpation in the midline, no stridor, no crepitus, no meningeal signs  Respiratory: Lungs clear to auscultation bilaterally, no wheezes, rales, or rhonchi. Not in respiratory distress  Cardiovascular:  Tachycardic rate. Regular rhythm. No murmurs, gallops, or rubs. 2+ distal pulses  Chest: No chest wall tenderness  GI:  Abdomen Soft, Non tender, Non distended. +BS. No organomegaly, no palpable masses,  No rebound, guarding, or rigidity. Musculoskeletal: Moves all extremities x 4. Warm and well perfused, no clubbing, cyanosis, or edema. Capillary refill <3 seconds  Integument: skin warm and dry. No rashes. Lymphatic: no lymphadenopathy noted  Neurologic: GCS 15, no focal deficits, symmetric strength 5/5 in the upper and lower extremities bilaterally  Psychiatric: Normal Affect    -------------------------------------------------- RESULTS -------------------------------------------------  I have personally reviewed all laboratory and imaging results for this patient. Results are listed below.      LABS:  Results for orders placed or performed during the hospital encounter of 12/15/20   CBC Auto Differential   Result Value Ref Range    WBC 9.5 4.5 - 11.5 E9/L    RBC 4.45 3.50 - 5.50 E12/L    Hemoglobin 14.1 11.5 - 15.5 g/dL    Hematocrit 42.6 34.0 - 48.0 %    MCV 95.7 80.0 - 99.9 fL    MCH 31.7 26.0 - 35.0 pg    MCHC 33.1 32.0 - 34.5 %    RDW 13.4 11.5 - 15.0 fL    Platelets 698 128 - 298 E9/L    MPV 10.5 7.0 - 12.0 fL    Neutrophils % 81.8 (H) 43.0 - 80.0 %    Immature Granulocytes % 0.4 0.0 - 5.0 %    Lymphocytes % 9.2 (L) 20.0 - 42.0 %    Monocytes % 7.7 2.0 - 12.0 %    Eosinophils % 0.6 0.0 - 6.0 %    Basophils % 0.3 0.0 - 2.0 %    Neutrophils Absolute 7.78 (H) 1.80 - 7.30 E9/L    Immature Granulocytes # 0.04 E9/L    Lymphocytes Absolute 0.88 (L) 1.50 - 4.00 E9/L    Monocytes Absolute 0.73 0.10 - 0.95 E9/L    Eosinophils Absolute 0.06 0.05 - 0.50 E9/L Basophils Absolute 0.03 0.00 - 0.20 E9/L   Comprehensive Metabolic Panel w/ Reflex to MG   Result Value Ref Range    Sodium 138 132 - 146 mmol/L    Potassium reflex Magnesium 4.4 3.5 - 5.0 mmol/L    Chloride 102 98 - 107 mmol/L    CO2 24 22 - 29 mmol/L    Anion Gap 12 7 - 16 mmol/L    Glucose 123 (H) 74 - 99 mg/dL    BUN 20 8 - 23 mg/dL    CREATININE 1.2 (H) 0.5 - 1.0 mg/dL    GFR Non-African American 43 >=60 mL/min/1.73    GFR African American 52     Calcium 10.2 8.6 - 10.2 mg/dL    Total Protein 6.8 6.4 - 8.3 g/dL    Alb 3.8 3.5 - 5.2 g/dL    Total Bilirubin 1.0 0.0 - 1.2 mg/dL    Alkaline Phosphatase 36 35 - 104 U/L    ALT 13 0 - 32 U/L    AST 19 0 - 31 U/L   Troponin   Result Value Ref Range    Troponin 0.03 0.00 - 0.03 ng/mL   Protime-INR   Result Value Ref Range    Protime 13.3 (H) 9.3 - 12.4 sec    INR 1.2    APTT   Result Value Ref Range    aPTT 28.7 24.5 - 35.1 sec   EKG 12 Lead   Result Value Ref Range    Ventricular Rate 92 BPM    Atrial Rate 92 BPM    P-R Interval 168 ms    QRS Duration 88 ms    Q-T Interval 388 ms    QTc Calculation (Bazett) 479 ms    P Axis 73 degrees    R Axis 30 degrees    T Axis 48 degrees       RADIOLOGY:  Interpreted by Radiologist.  CTA PULMONARY W CONTRAST   Final Result   1. Extensive bilateral pulmonary emboli. The extensive pulmonary emboli are   seen within the distal right main pulmonary artery extending into the   pulmonary artery branches of the right lung and the extensive pulmonary   emboli on the left are seen extending throughout the course of the left main   pulmonary artery into the pulmonary artery branches of the left lung. 2. The lungs are clear. There are no ground-glass infiltrates to suggest   findings of COVID pneumonia. US DUP LOWER EXTREMITIES BILATERAL VENOUS   Final Result   Addendum 1 of 1   ADDENDUM:   Findings were discussed by telephone with Dr. Lisa Rodriguez at 12:56 p.m. central   standard time on 12/15/2020.       Final      XR CHEST PORTABLE Final Result   Cardiomegaly. There is no evidence of failure or pneumonia. EKG: This EKG is signed and interpreted by the EP. Time: 1352  Rate: 90  Rhythm: Sinus  Interpretation: non-specific EKG  Comparison: None      ------------------------- NURSING NOTES AND VITALS REVIEWED ---------------------------   The nursing notes within the ED encounter and vital signs as below have been reviewed by myself. /77   Pulse 105   Temp 97.8 °F (36.6 °C) (Oral)   Resp 18   Ht 5' 10\" (1.778 m)   Wt 203 lb (92.1 kg)   SpO2 96%   BMI 29.13 kg/m²   Oxygen Saturation Interpretation: Normal    The patients available past medical records and past encounters were reviewed. ------------------------------ ED COURSE/MEDICAL DECISION MAKING----------------------  Medications   0.9 % sodium chloride bolus (has no administration in time range)   ondansetron (ZOFRAN) injection 4 mg (4 mg Intravenous Not Given 12/15/20 1553)   heparin (porcine) injection 7,370 Units (has no administration in time range)   heparin (porcine) injection 3,680 Units (has no administration in time range)   heparin 25,000 units in dextrose 5% 250 mL infusion (18 Units/kg/hr × 92.1 kg Intravenous New Bag 12/15/20 1533)   0.9 % sodium chloride bolus (0 mLs Intravenous Stopped 12/15/20 1436)   iopamidol (ISOVUE-370) 76 % injection 75 mL (75 mLs Intravenous Given 12/15/20 1459)   heparin (porcine) injection 7,370 Units (7,370 Units Intravenous Given 12/15/20 1535)         ED COURSE:       Medical Decision Making:    Unable to get up and walk without dyspnea, tachycardia, nausea or pre-syncope   IV heparin high-dose protocol started.  IV zoforan and IV fluids started   DVT positive as well      This patient's ED course included: a personal history and physicial examination, re-evaluation prior to disposition, IV medications, cardiac monitoring and continuous pulse oximetry This patient has remained hemodynamically stable, remained unchanged and been closely monitored during their ED course. Re-Evaluations:             Re-evaluation. Patients symptoms show no change    Re-examination  12/15/20   1:04 PM EST          Vital Signs:   Vitals:    12/15/20 1256   BP: 116/77   Pulse: 105   Resp: 18   Temp: 97.8 °F (36.6 °C)   TempSrc: Oral   SpO2: 96%   Weight: 203 lb (92.1 kg)   Height: 5' 10\" (1.778 m)       Time: 1530  Re-evaluation. Patients symptoms show no change  Repeat physical examination is not changed  Patient got up to bathroom, almost passed out, went hypoxic into 80s, tachycardic, presyncopal       Consultations:             3:47 PM EST  Spoke with Dr Deanne Martinez, discussed case, accepts admission to [facility] in ICU   4:15 PM EST  Spoke with Dr Larissa Toney, discussed case, accepts patient into ICU    Critical Care: Please note that the withdrawal or failure to initiate urgent interventions for this patient would likely result in a life threatening deterioration or permanent disability. Accordingly this patient received 30 minutes of critical care time, excluding separately billable procedures. Counseling: The emergency provider has spoken with the patient and discussed todays results, in addition to providing specific details for the plan of care and counseling regarding the diagnosis and prognosis. Questions are answered at this time and they are agreeable with the plan.       --------------------------------- IMPRESSION AND DISPOSITION ---------------------------------    IMPRESSION  1. Other acute pulmonary embolism, unspecified whether acute cor pulmonale present ([facility] [address]    2.  Acute deep vein thrombosis (DVT) of right lower extremity, unspecified vein (HCC)        DISPOSITION  Disposition: Admit to CCU/ICU  Patient condition is stable NOTE: This report was transcribed using voice recognition software.  Every effort was made to ensure accuracy; however, inadvertent computerized transcription errors may be present        Alfred Sarmiento DO  12/15/20 5985

## 2020-12-16 PROBLEM — I26.02 ACUTE SADDLE PULMONARY EMBOLISM WITH ACUTE COR PULMONALE (HCC): Status: ACTIVE | Noted: 2020-12-16

## 2020-12-16 LAB
ALBUMIN SERPL-MCNC: 3.6 G/DL (ref 3.5–5.2)
ALBUMIN SERPL-MCNC: 3.8 G/DL (ref 3.5–5.2)
ALP BLD-CCNC: 33 U/L (ref 35–104)
ALP BLD-CCNC: 36 U/L (ref 35–104)
ALT SERPL-CCNC: 23 U/L (ref 0–32)
ALT SERPL-CCNC: 27 U/L (ref 0–32)
ANION GAP SERPL CALCULATED.3IONS-SCNC: 12 MMOL/L (ref 7–16)
ANION GAP SERPL CALCULATED.3IONS-SCNC: 13 MMOL/L (ref 7–16)
APTT: 81 SEC (ref 24.5–35.1)
APTT: 82.2 SEC (ref 24.5–35.1)
APTT: 87.3 SEC (ref 24.5–35.1)
APTT: >240 SEC (ref 24.5–35.1)
AST SERPL-CCNC: 23 U/L (ref 0–31)
AST SERPL-CCNC: 27 U/L (ref 0–31)
BASOPHILS ABSOLUTE: 0.05 E9/L (ref 0–0.2)
BASOPHILS RELATIVE PERCENT: 0.6 % (ref 0–2)
BILIRUB SERPL-MCNC: 0.7 MG/DL (ref 0–1.2)
BILIRUB SERPL-MCNC: 0.8 MG/DL (ref 0–1.2)
BUN BLDV-MCNC: 16 MG/DL (ref 8–23)
BUN BLDV-MCNC: 18 MG/DL (ref 8–23)
CALCIUM SERPL-MCNC: 9.3 MG/DL (ref 8.6–10.2)
CALCIUM SERPL-MCNC: 9.6 MG/DL (ref 8.6–10.2)
CHLORIDE BLD-SCNC: 107 MMOL/L (ref 98–107)
CHLORIDE BLD-SCNC: 108 MMOL/L (ref 98–107)
CO2: 19 MMOL/L (ref 22–29)
CO2: 21 MMOL/L (ref 22–29)
CORTISOL TOTAL: 16.37 MCG/DL (ref 2.68–18.4)
CREAT SERPL-MCNC: 1 MG/DL (ref 0.5–1)
CREAT SERPL-MCNC: 1.1 MG/DL (ref 0.5–1)
EOSINOPHILS ABSOLUTE: 0.15 E9/L (ref 0.05–0.5)
EOSINOPHILS RELATIVE PERCENT: 1.9 % (ref 0–6)
FIBRINOGEN: 556 MG/DL (ref 225–540)
GFR AFRICAN AMERICAN: 58
GFR AFRICAN AMERICAN: >60
GFR NON-AFRICAN AMERICAN: 48 ML/MIN/1.73
GFR NON-AFRICAN AMERICAN: 54 ML/MIN/1.73
GLUCOSE BLD-MCNC: 106 MG/DL (ref 74–99)
GLUCOSE BLD-MCNC: 106 MG/DL (ref 74–99)
HCT VFR BLD CALC: 32.1 % (ref 34–48)
HCT VFR BLD CALC: 36.3 % (ref 34–48)
HCT VFR BLD CALC: 37.1 % (ref 34–48)
HCT VFR BLD CALC: 39.2 % (ref 34–48)
HEMOGLOBIN: 11.8 G/DL (ref 11.5–15.5)
HEMOGLOBIN: 12.2 G/DL (ref 11.5–15.5)
HEMOGLOBIN: 12.5 G/DL (ref 11.5–15.5)
HEMOGLOBIN: 9.8 G/DL (ref 11.5–15.5)
IMMATURE GRANULOCYTES #: 0.02 E9/L
IMMATURE GRANULOCYTES %: 0.3 % (ref 0–5)
INR BLD: 1.1
LV EF: 63 %
LVEF MODALITY: NORMAL
LYMPHOCYTES ABSOLUTE: 1.48 E9/L (ref 1.5–4)
LYMPHOCYTES RELATIVE PERCENT: 18.8 % (ref 20–42)
MAGNESIUM: 2.1 MG/DL (ref 1.6–2.6)
MAGNESIUM: 2.2 MG/DL (ref 1.6–2.6)
MCH RBC QN AUTO: 30.3 PG (ref 26–35)
MCH RBC QN AUTO: 30.9 PG (ref 26–35)
MCHC RBC AUTO-ENTMCNC: 31.9 % (ref 32–34.5)
MCHC RBC AUTO-ENTMCNC: 32.5 % (ref 32–34.5)
MCV RBC AUTO: 95 FL (ref 80–99.9)
MCV RBC AUTO: 95.1 FL (ref 80–99.9)
METER GLUCOSE: 113 MG/DL (ref 74–99)
METER GLUCOSE: 123 MG/DL (ref 74–99)
MONOCYTES ABSOLUTE: 0.65 E9/L (ref 0.1–0.95)
MONOCYTES RELATIVE PERCENT: 8.2 % (ref 2–12)
NEUTROPHILS ABSOLUTE: 5.54 E9/L (ref 1.8–7.3)
NEUTROPHILS RELATIVE PERCENT: 70.2 % (ref 43–80)
PDW BLD-RTO: 13.7 FL (ref 11.5–15)
PDW BLD-RTO: 13.8 FL (ref 11.5–15)
PHOSPHORUS: 2.7 MG/DL (ref 2.5–4.5)
PHOSPHORUS: 2.9 MG/DL (ref 2.5–4.5)
PLATELET # BLD: 143 E9/L (ref 130–450)
PLATELET # BLD: 155 E9/L (ref 130–450)
PMV BLD AUTO: 10.8 FL (ref 7–12)
PMV BLD AUTO: 11 FL (ref 7–12)
POTASSIUM SERPL-SCNC: 3.7 MMOL/L (ref 3.5–5)
POTASSIUM SERPL-SCNC: 4.1 MMOL/L (ref 3.5–5)
PRO-BNP: 6890 PG/ML (ref 0–450)
PROCALCITONIN: 0.07 NG/ML (ref 0–0.08)
PROTHROMBIN TIME: 12.8 SEC (ref 9.3–12.4)
RBC # BLD: 3.82 E12/L (ref 3.5–5.5)
RBC # BLD: 4.12 E12/L (ref 3.5–5.5)
SODIUM BLD-SCNC: 139 MMOL/L (ref 132–146)
SODIUM BLD-SCNC: 141 MMOL/L (ref 132–146)
TOTAL PROTEIN: 6.4 G/DL (ref 6.4–8.3)
TOTAL PROTEIN: 6.8 G/DL (ref 6.4–8.3)
TROPONIN: 0.02 NG/ML (ref 0–0.03)
WBC # BLD: 7.7 E9/L (ref 4.5–11.5)
WBC # BLD: 7.9 E9/L (ref 4.5–11.5)

## 2020-12-16 PROCEDURE — 84484 ASSAY OF TROPONIN QUANT: CPT

## 2020-12-16 PROCEDURE — 02FR3Z0 FRAGMENTATION OF LEFT PULMONARY ARTERY, PERCUTANEOUS APPROACH, ULTRASONIC: ICD-10-PCS | Performed by: SURGERY

## 2020-12-16 PROCEDURE — 2580000003 HC RX 258: Performed by: SURGERY

## 2020-12-16 PROCEDURE — 36014 PLACE CATHETER IN ARTERY: CPT | Performed by: SURGERY

## 2020-12-16 PROCEDURE — 2500000003 HC RX 250 WO HCPCS: Performed by: INTERNAL MEDICINE

## 2020-12-16 PROCEDURE — 85027 COMPLETE CBC AUTOMATED: CPT

## 2020-12-16 PROCEDURE — 99223 1ST HOSP IP/OBS HIGH 75: CPT | Performed by: INTERNAL MEDICINE

## 2020-12-16 PROCEDURE — C9113 INJ PANTOPRAZOLE SODIUM, VIA: HCPCS | Performed by: INTERNAL MEDICINE

## 2020-12-16 PROCEDURE — 2500000003 HC RX 250 WO HCPCS

## 2020-12-16 PROCEDURE — 85730 THROMBOPLASTIN TIME PARTIAL: CPT

## 2020-12-16 PROCEDURE — C1769 GUIDE WIRE: HCPCS

## 2020-12-16 PROCEDURE — 36556 INSERT NON-TUNNEL CV CATH: CPT

## 2020-12-16 PROCEDURE — 80053 COMPREHEN METABOLIC PANEL: CPT

## 2020-12-16 PROCEDURE — 84100 ASSAY OF PHOSPHORUS: CPT

## 2020-12-16 PROCEDURE — 82962 GLUCOSE BLOOD TEST: CPT

## 2020-12-16 PROCEDURE — 85018 HEMOGLOBIN: CPT

## 2020-12-16 PROCEDURE — 85025 COMPLETE CBC W/AUTO DIFF WBC: CPT

## 2020-12-16 PROCEDURE — 37211 THROMBOLYTIC ART THERAPY: CPT | Performed by: SURGERY

## 2020-12-16 PROCEDURE — 85384 FIBRINOGEN ACTIVITY: CPT

## 2020-12-16 PROCEDURE — C1894 INTRO/SHEATH, NON-LASER: HCPCS

## 2020-12-16 PROCEDURE — 85014 HEMATOCRIT: CPT

## 2020-12-16 PROCEDURE — 6370000000 HC RX 637 (ALT 250 FOR IP): Performed by: INTERNAL MEDICINE

## 2020-12-16 PROCEDURE — 36415 COLL VENOUS BLD VENIPUNCTURE: CPT

## 2020-12-16 PROCEDURE — 83735 ASSAY OF MAGNESIUM: CPT

## 2020-12-16 PROCEDURE — 2000000000 HC ICU R&B

## 2020-12-16 PROCEDURE — C1751 CATH, INF, PER/CENT/MIDLINE: HCPCS

## 2020-12-16 PROCEDURE — 93306 TTE W/DOPPLER COMPLETE: CPT

## 2020-12-16 PROCEDURE — 2709999900 HC NON-CHARGEABLE SUPPLY

## 2020-12-16 PROCEDURE — 6360000002 HC RX W HCPCS: Performed by: SURGERY

## 2020-12-16 PROCEDURE — 87040 BLOOD CULTURE FOR BACTERIA: CPT

## 2020-12-16 PROCEDURE — 99291 CRITICAL CARE FIRST HOUR: CPT | Performed by: SURGERY

## 2020-12-16 PROCEDURE — 02FQ3Z0 FRAGMENTATION OF RIGHT PULMONARY ARTERY, PERCUTANEOUS APPROACH, ULTRASONIC: ICD-10-PCS | Performed by: SURGERY

## 2020-12-16 PROCEDURE — 6360000002 HC RX W HCPCS: Performed by: INTERNAL MEDICINE

## 2020-12-16 PROCEDURE — 6360000002 HC RX W HCPCS: Performed by: EMERGENCY MEDICINE

## 2020-12-16 PROCEDURE — 2580000003 HC RX 258: Performed by: INTERNAL MEDICINE

## 2020-12-16 PROCEDURE — 85610 PROTHROMBIN TIME: CPT

## 2020-12-16 RX ORDER — SODIUM CHLORIDE 0.9 % (FLUSH) 0.9 %
10 SYRINGE (ML) INJECTION PRN
Status: DISCONTINUED | OUTPATIENT
Start: 2020-12-16 | End: 2020-12-23 | Stop reason: HOSPADM

## 2020-12-16 RX ORDER — LABETALOL HYDROCHLORIDE 5 MG/ML
5 INJECTION, SOLUTION INTRAVENOUS EVERY 4 HOURS PRN
Status: DISCONTINUED | OUTPATIENT
Start: 2020-12-16 | End: 2020-12-23 | Stop reason: HOSPADM

## 2020-12-16 RX ORDER — SODIUM CHLORIDE 9 MG/ML
INJECTION, SOLUTION INTRAVENOUS CONTINUOUS
Status: DISCONTINUED | OUTPATIENT
Start: 2020-12-16 | End: 2020-12-17

## 2020-12-16 RX ORDER — SENNA AND DOCUSATE SODIUM 50; 8.6 MG/1; MG/1
2 TABLET, FILM COATED ORAL DAILY PRN
Status: DISCONTINUED | OUTPATIENT
Start: 2020-12-16 | End: 2020-12-23 | Stop reason: HOSPADM

## 2020-12-16 RX ORDER — ONDANSETRON 2 MG/ML
4 INJECTION INTRAMUSCULAR; INTRAVENOUS EVERY 6 HOURS PRN
Status: DISCONTINUED | OUTPATIENT
Start: 2020-12-16 | End: 2020-12-23 | Stop reason: HOSPADM

## 2020-12-16 RX ORDER — SODIUM CHLORIDE 9 MG/ML
INJECTION, SOLUTION INTRAVENOUS CONTINUOUS PRN
Status: DISCONTINUED | OUTPATIENT
Start: 2020-12-16 | End: 2020-12-17

## 2020-12-16 RX ORDER — HEPARIN SODIUM 10000 [USP'U]/100ML
500 INJECTION, SOLUTION INTRAVENOUS CONTINUOUS
Status: DISCONTINUED | OUTPATIENT
Start: 2020-12-16 | End: 2020-12-17

## 2020-12-16 RX ORDER — ACETAMINOPHEN 325 MG/1
650 TABLET ORAL EVERY 4 HOURS PRN
Status: DISCONTINUED | OUTPATIENT
Start: 2020-12-16 | End: 2020-12-23 | Stop reason: HOSPADM

## 2020-12-16 RX ORDER — DULOXETIN HYDROCHLORIDE 60 MG/1
60 CAPSULE, DELAYED RELEASE ORAL DAILY
Status: DISCONTINUED | OUTPATIENT
Start: 2020-12-16 | End: 2020-12-23 | Stop reason: HOSPADM

## 2020-12-16 RX ORDER — SODIUM CHLORIDE 0.9 % (FLUSH) 0.9 %
10 SYRINGE (ML) INJECTION EVERY 12 HOURS SCHEDULED
Status: DISCONTINUED | OUTPATIENT
Start: 2020-12-16 | End: 2020-12-23 | Stop reason: HOSPADM

## 2020-12-16 RX ADMIN — HEPARIN SODIUM 15 UNITS/KG/HR: 10000 INJECTION, SOLUTION INTRAVENOUS at 06:44

## 2020-12-16 RX ADMIN — LABETALOL HYDROCHLORIDE 5 MG: 5 INJECTION INTRAVENOUS at 20:52

## 2020-12-16 RX ADMIN — HEPARIN SODIUM AND DEXTROSE 500 UNITS/HR: 10000; 5 INJECTION INTRAVENOUS at 16:16

## 2020-12-16 RX ADMIN — SODIUM CHLORIDE, PRESERVATIVE FREE 10 ML: 5 INJECTION INTRAVENOUS at 09:14

## 2020-12-16 RX ADMIN — SODIUM CHLORIDE: 9 INJECTION, SOLUTION INTRAVENOUS at 16:18

## 2020-12-16 RX ADMIN — Medication 2 G: at 18:11

## 2020-12-16 RX ADMIN — SODIUM CHLORIDE: 9 INJECTION, SOLUTION INTRAVENOUS at 16:19

## 2020-12-16 RX ADMIN — DULOXETINE HYDROCHLORIDE 60 MG: 60 CAPSULE, DELAYED RELEASE ORAL at 13:00

## 2020-12-16 RX ADMIN — PANTOPRAZOLE SODIUM 40 MG: 40 INJECTION, POWDER, FOR SOLUTION INTRAVENOUS at 09:13

## 2020-12-16 RX ADMIN — SODIUM CHLORIDE, PRESERVATIVE FREE 10 ML: 5 INJECTION INTRAVENOUS at 21:08

## 2020-12-16 RX ADMIN — ALTEPLASE 1 MG/HR: 2.2 INJECTION, POWDER, LYOPHILIZED, FOR SOLUTION INTRAVENOUS at 21:07

## 2020-12-16 RX ADMIN — ALTEPLASE 1 MG/HR: 2.2 INJECTION, POWDER, LYOPHILIZED, FOR SOLUTION INTRAVENOUS at 16:19

## 2020-12-16 RX ADMIN — ACETAMINOPHEN 650 MG: 325 TABLET ORAL at 09:13

## 2020-12-16 RX ADMIN — METOPROLOL TARTRATE 25 MG: 25 TABLET, FILM COATED ORAL at 09:13

## 2020-12-16 RX ADMIN — HEPARIN SODIUM AND DEXTROSE 500 UNITS/HR: 10000; 5 INJECTION INTRAVENOUS at 16:19

## 2020-12-16 RX ADMIN — ALTEPLASE 1 MG/HR: 2.2 INJECTION, POWDER, LYOPHILIZED, FOR SOLUTION INTRAVENOUS at 21:08

## 2020-12-16 RX ADMIN — ALTEPLASE 1 MG/HR: 2.2 INJECTION, POWDER, LYOPHILIZED, FOR SOLUTION INTRAVENOUS at 16:15

## 2020-12-16 RX ADMIN — SODIUM CHLORIDE, POTASSIUM CHLORIDE, SODIUM LACTATE AND CALCIUM CHLORIDE: 600; 310; 30; 20 INJECTION, SOLUTION INTRAVENOUS at 13:00

## 2020-12-16 ASSESSMENT — PAIN SCALES - GENERAL
PAINLEVEL_OUTOF10: 0
PAINLEVEL_OUTOF10: 3
PAINLEVEL_OUTOF10: 0

## 2020-12-16 NOTE — OP NOTE
Cardiovascular Lab Procedure Report    Surgeon: Rhea Davila M.D. Pre-procedure Diagnosis: Massive Pulmonary Embolus, Right ventricular dilatation  Post-procedure Diagnosis: Same   Procedure:    Right Femoral vein access under US guidance x 2 and introduction of catheter into Right common iliac vein   97474-55 Selective right pulmonary artery catheter placement in branch , angiogram   23137 Selective left pulmonary artery catheter placement in branch, angiogram   85449, 20557-92 Placement of 2 EKOS Lysis catheter and US wire (12 cm infusion length) in bilateral pulmonary artery      Anesthesia: Local with IV sedation  Assistants: Cath Lab Staff  Estimated Blood Loss: Minimal  Complications: none  Findings[de-identified] Extensive clot noted in pulmonary arteries bilaterally    Procedure Details :  Patient had CTA preformed to dx PE. Based on this information, echo findings, clinical judgment, lab findings, and physical exam decision was made to proceed with PE lysis. Timeout preformed identifying pt and procedure. Right groin region prepped and draped in sterile fashion. Patient given sedation as needed throughout the case. Right femoral vein was noted to be patent and was accessed x 2 under ultrasound guidance after infiltrating with local. A printer was not available to print out an image. Cook needle, than 6 fr sheath placed. Advantage glide wire and sim 1 catheter advanced into right common iliac vein, IVC and than right heart. The right main pulmonary artery and than right lower pulmonary artery branch was accessed using the 0.35 advantage glide wire and the sim 1 catheter. Than the EKOS catheter was placed and a right pulmonary arteriogram was done to confirm placement. The US wire was than placed after removing the advantage glide wire. The left main pulmonary artery and than left lower pulmonary artery branch was accessed using the 0.35 advantage glide wire and the sim 1 catheter. Than the EKOS catheter was placed and a left pulmonary arteriogram was done to confirm placement. The US wire was than placed after removing the advantage glide wire. The TPA at 1 mg./hr to drug port, saline at 35 ml/hr to the coolant port, and 500 units/hr of heparin to the sheath.      Plan  Remove catheters in 12 hours  Observe in ICU    Samantha Batres

## 2020-12-16 NOTE — PLAN OF CARE
Problem: Skin Integrity:  Goal: Will show no infection signs and symptoms  Description: Will show no infection signs and symptoms  Outcome: Ongoing     Problem: Skin Integrity:  Goal: Absence of new skin breakdown  Description: Absence of new skin breakdown  Outcome: Met This Shift

## 2020-12-16 NOTE — CONSULTS
 Kidney stones     Sinus infection     Superficial phlebitis of leg 10/30/2014       Past Surgical History:        Procedure Laterality Date    APPENDECTOMY      BLADDER STONE REMOVAL      COLONOSCOPY      DILATION AND CURETTAGE OF UTERUS      HERNIA REPAIR      TOTAL KNEE ARTHROPLASTY      right    TOTAL NEPHRECTOMY      right       Medications Prior to Admission:    Prior to Admission medications    Medication Sig Start Date End Date Taking? Authorizing Provider   simvastatin (ZOCOR) 40 MG tablet Take 1 tablet by mouth nightly 9/23/20  Yes Peggye Heart Traikoff, DO   DULoxetine (CYMBALTA) 60 MG extended release capsule Take 1 capsule by mouth daily 6/18/20  Yes Peggye Heart Traikoff, DO   meloxicam (MOBIC) 15 MG tablet Take 1 tablet by mouth daily 6/18/20  Yes Peggye Heart Traikoff, DO   metoprolol tartrate (LOPRESSOR) 25 MG tablet Take 1 tablet by mouth daily 12/10/19  Yes Peggye Heart Traikoff, DO   docusate sodium (COLACE) 100 MG capsule Take 100 mg by mouth 11/23/11  Yes Historical Provider, MD   timolol (TIMOPTIC) 0.5 % ophthalmic solution  8/29/14  Yes Historical Provider, MD   Handicap Placard MISC by Does not apply route Duration 5 years 9/23/20   Peggye Heart Traikoff, DO   Psyllium (VEGETABLE LAXATIVE PO) Take by mouth    Historical Provider, MD       Allergies:  Penicillins    Social History:   TOBACCO:   reports that she has never smoked. She has never used smokeless tobacco.  ETOH:   reports no history of alcohol use. Family History:       Problem Relation Age of Onset    Stroke Mother     Other Mother         phlebitis       REVIEW OF SYSTEMS:    · Constitutional: No fever, no chills, no change in weight; good appetite  · HEENT: No blurred vision, no ear problems, no sore throat, no rhinorrhea.   · Respiratory: No cough, no sputum production, no pleuritic chest pain, no shortness of breath · Cardiology: No angina, no dyspnea on exertion, no paroxysmal nocturnal dyspnea, no orthopnea, no palpitation, no leg swelling. · Gastroenterology: No dysphagia, no reflux; no abdominal pain, no nausea or vomiting; no constipation or diarrhea.  No hematochezia   · Genitourinary: No dysuria, no frequency, hesitancy; no hematuria  · Musculoskeletal: no joint pain, no myalgia, no change in range of movement  · Neurology: no focal weakness in extremities, no slurred speech, no double vision, no tingling or numbness sensation  · Endocrinology: no temperature intolerance, no polyphagia, polydipsia or polyuria  · Hematology: no increased bleeding, no bruising, no lymphadenopathy  · Skin: no skin changes noticed by patient  · Psychology: no depressed mood, no suicidal ideation    Physical Exam   · Vitals: BP (!) 146/94   Pulse 98   Temp 97.8 °F (36.6 °C) (Oral)   Resp 16   Ht 5' 10\" (1.778 m)   Wt 203 lb (92.1 kg)   SpO2 92%   BMI 29.13 kg/m²   ·     ·    · General Appearance: alert and oriented to person, place and time, well developed and well- nourished, in no acute distress  · Skin: warm and dry, no rash or erythema  · Head: normocephalic and atraumatic  · Eyes: pupils equal, round, and reactive to light, extraocular eye movements intact, conjunctivae normal  · ENT: tympanic membrane, external ear and ear canal normal bilaterally, nose without deformity, nasal mucosa and turbinates normal without polyps  · Neck: supple and non-tender without mass, no thyromegaly or thyroid nodules, no cervical lymphadenopathy  · Pulmonary/Chest: clear to auscultation bilaterally- no wheezes, rales or rhonchi, normal air movement, no respiratory distress  · Cardiovascular: Tachycardia, regular rhythm, normal S1 and S2, no murmurs, rubs, clicks, or gallops, distal pulses intact, no carotid bruits  · Abdomen: soft, non-tender, non-distended, normal bowel sounds, no masses or organomegaly · Extremities: no cyanosis, clubbing, swelling and tenderness of the right leg, negative erythematous, mild warmness, Lt LE nl  · Musculoskeletal: normal range of motion, no joint swelling, deformity  · Neurologic: reflexes normal and symmetric, no cranial nerve deficit, gait, coordination and speech normal     Lines     site day    Art line   None    TLC None    PICC None    Hemoaccess None    Oxygen:    ? Mechanical Ventilation:  ? ABG:     Labs and Imaging Studies   Basic Labs  CBC:   Lab Results   Component Value Date    WBC 9.5 12/15/2020    RBC 4.45 12/15/2020    HGB 14.1 12/15/2020    HCT 42.6 12/15/2020    MCV 95.7 12/15/2020    RDW 13.4 12/15/2020     12/15/2020     BMP:    Lab Results   Component Value Date     12/15/2020    K 4.4 12/15/2020     12/15/2020    CO2 24 12/15/2020    BUN 20 12/15/2020     PT/INR:  No results found for: PTINR  PTT:    Lab Results   Component Value Date    APTT 28.7 12/15/2020       Imaging Studies:     Xr Chest Portable    Result Date: 12/15/2020  EXAMINATION: ONE XRAY VIEW OF THE CHEST 12/15/2020 1:13 pm COMPARISON: 09/23/2020 HISTORY: ORDERING SYSTEM PROVIDED HISTORY: weak/lightheaded TECHNOLOGIST PROVIDED HISTORY: Reason for exam:->weak/lightheaded FINDINGS: The heart is enlarged. No findings of failure. The lungs are clear. There is no focal infiltrate or effusion. Cardiomegaly. There is no evidence of failure or pneumonia.     Cta Pulmonary W Contrast    Result Date: 12/15/2020 EXAMINATION: CTA OF THE CHEST 12/15/2020 2:51 pm TECHNIQUE: CTA of the chest was performed after the administration of intravenous contrast.  Multiplanar reformatted images are provided for review. MIP images are provided for review. Dose modulation, iterative reconstruction, and/or weight based adjustment of the mA/kV was utilized to reduce the radiation dose to as low as reasonably achievable. COMPARISON: None. HISTORY: ORDERING SYSTEM PROVIDED HISTORY: sob/dizzy/ro pe TECHNOLOGIST PROVIDED HISTORY: Reason for exam:->sob/dizzy/ro pe FINDINGS: Pulmonary Arteries: There are significant filling defects seen within the distal right main pulmonary artery extending into the 2nd, 3rd and 4th order pulmonary artery branches of the right upper, right middle and right lower lobes. There are extensive filling defect seen within the left main pulmonary artery extending into the 1st, 2nd and 3rd order pulmonary branches of the left upper and left lower lobes. Please note the embolus does not straddle the pulmonary artery bifurcation. Mediastinum: No evidence of mediastinal lymphadenopathy. The heart and pericardium demonstrate no acute abnormality. There is no acute abnormality of the thoracic aorta. Lungs/pleura: The lungs are without acute process. No focal consolidation or pulmonary edema. No evidence of pleural effusion or pneumothorax. Upper Abdomen: Limited images of the upper abdomen are unremarkable. Soft Tissues/Bones: No acute bone or soft tissue abnormality. During multidisciplinary team rounds Diane jasso a 68 y.o. female was seen, examined and discussed. This is confirmation that I have personally seen and examined the patient and that the key elements of the encounter were performed by me (> 85 % time). The medications & laboratory data was discussed and adjusted where necessary. The radiographic images were reviewed or with radiologist or consultant if felt dis-concordant with the exam or history. The above findings were corroborated, plans confirmed and changes made if needed. Family is updated at the bedside as available. Key issues of the case were discussed among consultants. Critical Care time is documented if appropriate.       Marychuy Holt, DO, FACP, FCCP, [facility],

## 2020-12-16 NOTE — H&P
Del Galvez MD FACP   History and Physical      CHIEF COMPLAINT:    Right leg pain and shortness of breath      HISTORY OF PRESENT ILLNESS:    '72-year-old woman presented to hospital emergency room with acute onset of right leg pain associated with shortness of breath  Spoke with the ER physician at the time of admission  She was found to have DVT right leg and hypoxia related to multiple pulmonary emboli  She denied recent travel  Admits to having had a fall about a month ago  Since then she has been somewhat inactive  Found to have DVT  Admitted for further management  Data reviewed in detail  Fairly comfortable this morning  Spoke with the nursing staff    Past Medical History:    Past Medical History:   Diagnosis Date    Acute saddle pulmonary embolism with acute cor pulmonale ([facility] [address] 12/16/2020    Arthritis     Cataracts, bilateral     Cystitis     Depression     DVT of lower limb, acute ([facility] [address] 03/15/2013    Glaucoma     s/p stent placement    Hernia     HIGH CHOLESTEROL     Hypertension     Kidney stones     Sinus infection     Superficial phlebitis of leg 10/30/2014       Past Surgical History:    Past Surgical History:   Procedure Laterality Date    APPENDECTOMY      BLADDER STONE REMOVAL      COLONOSCOPY      DILATION AND CURETTAGE OF UTERUS      HERNIA REPAIR      TOTAL KNEE ARTHROPLASTY      right    TOTAL NEPHRECTOMY      right       Medications Prior to Admission:    Medications Prior to Admission: simvastatin (ZOCOR) 40 MG tablet, Take 1 tablet by mouth nightly  DULoxetine (CYMBALTA) 60 MG extended release capsule, Take 1 capsule by mouth daily  meloxicam (MOBIC) 15 MG tablet, Take 1 tablet by mouth daily  metoprolol tartrate (LOPRESSOR) 25 MG tablet, Take 1 tablet by mouth daily  docusate sodium (COLACE) 100 MG capsule, Take 100 mg by mouth  timolol (TIMOPTIC) 0.5 % ophthalmic solution,   Handicap Placard MISC, by Does not apply route Duration 5 years Psyllium (VEGETABLE LAXATIVE PO), Take by mouth    Allergies:    Penicillins    Social History:    reports that she has never smoked. She has never used smokeless tobacco. She reports that she does not drink alcohol or use drugs. Family History:   family history includes Other in her mother; Stroke in her mother. REVIEW OF SYSTEMS:  As above in the HPI, otherwise negative    PHYSICAL EXAM:    Vitals:  /85   Pulse 101   Temp 98.2 °F (36.8 °C) (Oral)   Resp 22   Ht 5' 10\" (1.778 m)   Wt 205 lb (93 kg)   SpO2 95%   BMI 29.41 kg/m²     General:  Awake, alert, oriented X 3. Well developed, well nourished, well groomed. No apparent distress. HEENT:  Normocephalic, atraumatic. Pupils equal, round, reactive to light. No scleral icterus. No conjunctival injection. Normal lips, teeth, and gums. No nasal discharge. Neck:  Supple  Heart:  RRR, no murmurs, gallops, rubs  Lungs:  CTA bilaterally, bilat symmetrical expansion, no wheeze, rales, or rhonchi  Abdomen:   Bowel sounds present, soft, nontender, no masses, no organomegaly, no peritoneal signs  Extremities:  No clubbing, cyanosis, or edema  Skin:  Warm and dry, no open lesions or rash  Neuro:  Cranial nerves 2-12 intact, no focal deficits  Breast: deferred  Rectal: deferred  Genitalia:  deferred    LABS:  Data reviewed in detail    ASSESSMENT:      Principal Problem:    Acute saddle pulmonary embolism with acute cor pulmonale (HCC)    PE (pulmonary thromboembolism) ([facility] [address]  DVT right lower extremity  I am not sure with the earlier this is a provoked or nonprovoked  Earlier knee replacement was 10 years ago  Did have a fall about 4 weeks ago since then she has been negative  This could make a case for a provoked thrombus  Multiple comorbidities  Reviewed in detail with the patient      PLAN:  Heparin infusion  Vascular consult  Check echocardiogram  Resume home medications  Hold all nonsteroidals and aspirins  Reviewed in detail with the patient Suspect she will be on anticoagulant therapy like Eliquis for the next 6 months    Arna Medicus  6:44 PM  12/16/2020

## 2020-12-16 NOTE — CARE COORDINATION
12/16 Care Coordination:Bri in ED from home, Dx Rt DVT bilt PE. Spoke with her in her room. PTA she lives alone, her sister lives in same [address] [address] and helps her as needed. She states she is independent just not going anywhere d/t COVID. She has a cane and walker at home but does not use. Discharge plan is to return home. If need HHC and DME is agreeable. Options reviewed, [facility] and for [facility] had [facility] in past and would like again. If [facility] or [facility] can not accept she said any agiency that takes her Insurance. CM/SW will continue to follow for discharge planning.    Keaton LANCEN,RN-BC  [phone]

## 2020-12-16 NOTE — CONSULTS
Vascular Surgery Consultation Note    Reason for Consult:  submssive pe    HPI : This is a 68 y.o. female admitted on 12/15/2020 12:51 PM with right leg swelling, lighheadedness, sob. She was noted to have significant PE on CTA. She was noted to have signficant R heart dysfunction on echo. She has a hx of R LE DVT after surgery per her report 2005. She denies hx of PE. She denies hx of clotting disorder. She admits to decreased activity recently. She denies significant travel, long car rides, plane rides, trauma. Vascular surgery is consulted in regards to sumbassive pe.       ROS : All others Negative if blank [], Positive if [x]  General Urinary   [] Fevers [] Hematuria   [] Chills [] Dysuria   [] Weight Loss Vascular   Skin [] Claudication   [] Tissue Loss [] Rest Pain   Eyes Neurologic   [x] Wears Glasses/Contacts [] Stroke/TIA   [] Vision Changes [] Focal weakness   Respiratory [] Slurred Speech    [x] Shortness of breath ENT   Cardiovascular [] Difficulty swallowing   [] Chest Pain Endocrine    [x] Shortness of breath with exertion [] Increased Thirst   Gastrointestinal    [] Abdominal Pain    [] Melena   [] Hematochezia         Past Medical History:   Diagnosis Date    Acute saddle pulmonary embolism with acute cor pulmonale ([facility] [address] 12/16/2020    Arthritis     Cataracts, bilateral     Cystitis     Depression     DVT of lower limb, acute ([address] 03/15/2013    Glaucoma     s/p stent placement    Hernia     HIGH CHOLESTEROL     Hypertension     Kidney stones     Sinus infection     Superficial phlebitis of leg 10/30/2014        Past Surgical History:   Procedure Laterality Date    APPENDECTOMY      BLADDER STONE REMOVAL      COLONOSCOPY      DILATION AND CURETTAGE OF UTERUS      HERNIA REPAIR      TOTAL KNEE ARTHROPLASTY      right    TOTAL NEPHRECTOMY      right     Current Medications:    alteplase (ACTIVASE) 10 mg in 0.9% sodium chloride infusion 100 mL      And  alteplase (ACTIVASE) 10 mg in 0.9% sodium chloride infusion 100 mL      heparin (PORCINE) Infusion      And    heparin (PORCINE) Infusion      sodium chloride      And    sodium chloride      heparin (PORCINE) Infusion 11 Units/kg/hr (12/16/20 1327)    lactated ringers 75 mL/hr at 12/16/20 1300    dextrose        sennosides-docusate sodium, perflutren lipid microspheres, heparin (porcine), heparin (porcine), sodium chloride flush, promethazine **OR** ondansetron, polyethylene glycol, acetaminophen **OR** acetaminophen, perflutren lipid microspheres, glucose, dextrose, glucagon (rDNA), dextrose    metoprolol tartrate  25 mg Oral Daily    DULoxetine  60 mg Oral Daily    ceFAZolin (ANCEF) IVPB  2 g Intravenous On Call to OR    ondansetron  4 mg Intravenous Once    sodium chloride flush  10 mL Intravenous 2 times per day    pantoprazole  40 mg Intravenous Daily    And    sodium chloride (PF)  10 mL Intravenous Daily      Allergies:  Penicillins  Social History     Socioeconomic History    Marital status: Single     Spouse name: Not on file    Number of children: Not on file    Years of education: Not on file    Highest education level: Not on file   Occupational History    Not on file   Social Needs    Financial resource strain: Not on file    Food insecurity     Worry: Not on file     Inability: Not on file    Transportation needs     Medical: Not on file     Non-medical: Not on file   Tobacco Use    Smoking status: Never Smoker    Smokeless tobacco: Never Used   Substance and Sexual Activity    Alcohol use: Never     Frequency: Never    Drug use: Never    Sexual activity: Not on file   Lifestyle    Physical activity     Days per week: Not on file     Minutes per session: Not on file    Stress: Not on file   Relationships    Social connections     Talks on phone: Not on file     Gets together: Not on file     Attends Christianity service: Not on file Active member of club or organization: Not on file     Attends meetings of clubs or organizations: Not on file     Relationship status: Not on file    Intimate partner violence     Fear of current or ex partner: Not on file     Emotionally abused: Not on file     Physically abused: Not on file     Forced sexual activity: Not on file   Other Topics Concern    Not on file   Social History Narrative    Not on file     Family History   Problem Relation Age of Onset    Stroke Mother     Other Mother         phlebitis     PHYSICAL EXAM:    /78   Pulse 90   Temp 98.2 °F (36.8 °C) (Oral)   Resp 20   Ht 5' 10\" (1.778 m)   Wt 205 lb (93 kg)   SpO2 96%   BMI 29.41 kg/m²   CONSTITUTIONAL:   Awake, alert, cooperative  PSYCHIATRIC :  Oriented to time, place and person      Appropriate insight to disease process  EYES: Lids and lashes normal  ENT:  External ears and nose without lesions   Hearing deficits not noted  NECK: Supple, symmetrical, trachea midline   Thyroid goiter not appreciated  LUNGS:  increased work of breathing                 Good respiratory excursion  CARDIOVASCULAR:  regular rate and rhythm   ABDOMEN:  soft, non-distended, non-tender  SKIN:   Normal skin color   Texture and turgor normal, no induration  EXTREMITIES:   R UE 5/5 strength   No cyanosis noted in nail beds  L UE 5/5 strength   No cyanosis noted in nail beds  R LE Edema present   Open wounds absent   L LE Edema present   Open wound absent  R femoral 2+ L femoral 2+   R posterior tibial 2+ L posterior tibial 2+   R dorsalis pedis 1+ L dorsalis pedis 2+     LABS:    Lab Results   Component Value Date    WBC 7.9 12/16/2020    HGB 9.8 (L) 12/16/2020    HCT 32.1 (L) 12/16/2020     12/16/2020    PROTIME 12.8 (H) 12/16/2020    INR 1.1 12/16/2020    K 4.1 12/16/2020    BUN 16 12/16/2020    CREATININE 1.0 12/16/2020     Lab Results   Component Value Date    LABA1C 5.7 (H) 09/24/2020     No results found for: EAG  Lab Results Component Value Date    LABALBU 3.6 12/16/2020        Radiology pertinent to vascular surgery evaluation reviewed    Assesment/Plan  Sumbassive Pulmonary Embolism  Right LE DVT  · RV to LV ratio is > 0.9  · RV is moderately dilated on echocardiogram  · RV has severely reduced systolic function based of TAPSE of 0.9  · Pro BNP is 6890  · Troponin is 0.02  · Pt is  tachycardic - HR currently 105  · Pt is not hypotensive - SBP currently 120  · I feel that patient zena  a good candidate for placement of pulmonary embolism TPA lysis catheters   · Etiology is likely stasis  · discussed with patient pathophysiology of DVT, PE   · All ?s answered    I discussed with patient and family members at bedside options of  1. No intervention - Full dose anticoagulation  2. IV TPA    3. Catheter directed lysis of pulmonary embolus with TPA    · I discussed with associated consultants and it is felt that catheter directed lysis of pulmonary embolus with TPA is  the best option  · they understood risk of bleeding associated with TPA - brain hemorrhagic , GI bleeding  · Pt and family explained risks, benefits, complications, procedure, alternatives, options, and expected outcomes and was agreeable to proceed with lysis catheter placement    Samantha Victor    Total critical care time caring for this patient with life threatening, unstable organ failure, including direct patient contact, management of life support systems, review of data including imaging and labs, discussions with other team members and physicians at least 35 minutes so far today, excluding procedures.

## 2020-12-17 LAB
ALBUMIN SERPL-MCNC: 3.2 G/DL (ref 3.5–5.2)
ALP BLD-CCNC: 29 U/L (ref 35–104)
ALT SERPL-CCNC: 19 U/L (ref 0–32)
ANION GAP SERPL CALCULATED.3IONS-SCNC: 7 MMOL/L (ref 7–16)
APTT: 27.2 SEC (ref 24.5–35.1)
APTT: 57.7 SEC (ref 24.5–35.1)
APTT: 58.9 SEC (ref 24.5–35.1)
APTT: 65.9 SEC (ref 24.5–35.1)
AST SERPL-CCNC: 22 U/L (ref 0–31)
BASOPHILS ABSOLUTE: 0.01 E9/L (ref 0–0.2)
BASOPHILS RELATIVE PERCENT: 0.1 % (ref 0–2)
BILIRUB SERPL-MCNC: 0.5 MG/DL (ref 0–1.2)
BUN BLDV-MCNC: 15 MG/DL (ref 8–23)
CALCIUM SERPL-MCNC: 8.9 MG/DL (ref 8.6–10.2)
CHLORIDE BLD-SCNC: 109 MMOL/L (ref 98–107)
CO2: 21 MMOL/L (ref 22–29)
CREAT SERPL-MCNC: 1.1 MG/DL (ref 0.5–1)
EOSINOPHILS ABSOLUTE: 0.09 E9/L (ref 0.05–0.5)
EOSINOPHILS RELATIVE PERCENT: 1.1 % (ref 0–6)
FIBRINOGEN: 477 MG/DL (ref 225–540)
FIBRINOGEN: 481 MG/DL (ref 225–540)
FIBRINOGEN: 484 MG/DL (ref 225–540)
GFR AFRICAN AMERICAN: 58
GFR NON-AFRICAN AMERICAN: 48 ML/MIN/1.73
GLUCOSE BLD-MCNC: 113 MG/DL (ref 74–99)
HCT VFR BLD CALC: 29 % (ref 34–48)
HCT VFR BLD CALC: 30 % (ref 34–48)
HCT VFR BLD CALC: 35.3 % (ref 34–48)
HCT VFR BLD CALC: 36 % (ref 34–48)
HCT VFR BLD CALC: 37.4 % (ref 34–48)
HEMOGLOBIN: 10.9 G/DL (ref 11.5–15.5)
HEMOGLOBIN: 11.5 G/DL (ref 11.5–15.5)
HEMOGLOBIN: 11.7 G/DL (ref 11.5–15.5)
HEMOGLOBIN: 9.2 G/DL (ref 11.5–15.5)
HEMOGLOBIN: 9.7 G/DL (ref 11.5–15.5)
IMMATURE GRANULOCYTES #: 0.04 E9/L
IMMATURE GRANULOCYTES %: 0.5 % (ref 0–5)
INR BLD: 1.3
LYMPHOCYTES ABSOLUTE: 1.37 E9/L (ref 1.5–4)
LYMPHOCYTES RELATIVE PERCENT: 17 % (ref 20–42)
MAGNESIUM: 2.1 MG/DL (ref 1.6–2.6)
MCH RBC QN AUTO: 30.2 PG (ref 26–35)
MCH RBC QN AUTO: 30.3 PG (ref 26–35)
MCH RBC QN AUTO: 30.8 PG (ref 26–35)
MCH RBC QN AUTO: 31 PG (ref 26–35)
MCHC RBC AUTO-ENTMCNC: 30.9 % (ref 32–34.5)
MCHC RBC AUTO-ENTMCNC: 31.3 % (ref 32–34.5)
MCHC RBC AUTO-ENTMCNC: 31.9 % (ref 32–34.5)
MCHC RBC AUTO-ENTMCNC: 32.3 % (ref 32–34.5)
MCV RBC AUTO: 95.8 FL (ref 80–99.9)
MCV RBC AUTO: 96.4 FL (ref 80–99.9)
MCV RBC AUTO: 96.5 FL (ref 80–99.9)
MCV RBC AUTO: 98.1 FL (ref 80–99.9)
METER GLUCOSE: 105 MG/DL (ref 74–99)
METER GLUCOSE: 127 MG/DL (ref 74–99)
METER GLUCOSE: 142 MG/DL (ref 74–99)
MONOCYTES ABSOLUTE: 0.64 E9/L (ref 0.1–0.95)
MONOCYTES RELATIVE PERCENT: 7.9 % (ref 2–12)
NEUTROPHILS ABSOLUTE: 5.91 E9/L (ref 1.8–7.3)
NEUTROPHILS RELATIVE PERCENT: 73.4 % (ref 43–80)
PDW BLD-RTO: 13.7 FL (ref 11.5–15)
PDW BLD-RTO: 13.8 FL (ref 11.5–15)
PDW BLD-RTO: 13.8 FL (ref 11.5–15)
PDW BLD-RTO: 13.9 FL (ref 11.5–15)
PHOSPHORUS: 3.4 MG/DL (ref 2.5–4.5)
PLATELET # BLD: 104 E9/L (ref 130–450)
PLATELET # BLD: 119 E9/L (ref 130–450)
PLATELET # BLD: 126 E9/L (ref 130–450)
PLATELET # BLD: 127 E9/L (ref 130–450)
PMV BLD AUTO: 10.6 FL (ref 7–12)
PMV BLD AUTO: 10.9 FL (ref 7–12)
PMV BLD AUTO: 11 FL (ref 7–12)
PMV BLD AUTO: 11 FL (ref 7–12)
POTASSIUM SERPL-SCNC: 4.3 MMOL/L (ref 3.5–5)
PROTHROMBIN TIME: 14.1 SEC (ref 9.3–12.4)
RBC # BLD: 3.13 E12/L (ref 3.5–5.5)
RBC # BLD: 3.6 E12/L (ref 3.5–5.5)
RBC # BLD: 3.73 E12/L (ref 3.5–5.5)
RBC # BLD: 3.88 E12/L (ref 3.5–5.5)
SODIUM BLD-SCNC: 137 MMOL/L (ref 132–146)
TOTAL PROTEIN: 5.7 G/DL (ref 6.4–8.3)
WBC # BLD: 6.8 E9/L (ref 4.5–11.5)
WBC # BLD: 6.9 E9/L (ref 4.5–11.5)
WBC # BLD: 8.1 E9/L (ref 4.5–11.5)
WBC # BLD: 9.3 E9/L (ref 4.5–11.5)

## 2020-12-17 PROCEDURE — 6360000002 HC RX W HCPCS: Performed by: SURGERY

## 2020-12-17 PROCEDURE — 85027 COMPLETE CBC AUTOMATED: CPT

## 2020-12-17 PROCEDURE — 2700000000 HC OXYGEN THERAPY PER DAY

## 2020-12-17 PROCEDURE — 2000000000 HC ICU R&B

## 2020-12-17 PROCEDURE — 85025 COMPLETE CBC W/AUTO DIFF WBC: CPT

## 2020-12-17 PROCEDURE — 85610 PROTHROMBIN TIME: CPT

## 2020-12-17 PROCEDURE — 82962 GLUCOSE BLOOD TEST: CPT

## 2020-12-17 PROCEDURE — 83735 ASSAY OF MAGNESIUM: CPT

## 2020-12-17 PROCEDURE — 85730 THROMBOPLASTIN TIME PARTIAL: CPT

## 2020-12-17 PROCEDURE — 2580000003 HC RX 258: Performed by: SURGERY

## 2020-12-17 PROCEDURE — 85014 HEMATOCRIT: CPT

## 2020-12-17 PROCEDURE — 85384 FIBRINOGEN ACTIVITY: CPT

## 2020-12-17 PROCEDURE — 99232 SBSQ HOSP IP/OBS MODERATE 35: CPT | Performed by: SURGERY

## 2020-12-17 PROCEDURE — C9113 INJ PANTOPRAZOLE SODIUM, VIA: HCPCS | Performed by: SURGERY

## 2020-12-17 PROCEDURE — 85018 HEMOGLOBIN: CPT

## 2020-12-17 PROCEDURE — 99233 SBSQ HOSP IP/OBS HIGH 50: CPT | Performed by: INTERNAL MEDICINE

## 2020-12-17 PROCEDURE — 84100 ASSAY OF PHOSPHORUS: CPT

## 2020-12-17 PROCEDURE — 6370000000 HC RX 637 (ALT 250 FOR IP): Performed by: SURGERY

## 2020-12-17 PROCEDURE — 80053 COMPREHEN METABOLIC PANEL: CPT

## 2020-12-17 RX ORDER — HEPARIN SODIUM 10000 [USP'U]/100ML
11 INJECTION, SOLUTION INTRAVENOUS CONTINUOUS
Status: DISCONTINUED | OUTPATIENT
Start: 2020-12-17 | End: 2020-12-18

## 2020-12-17 RX ORDER — HEPARIN SODIUM 1000 [USP'U]/ML
40 INJECTION, SOLUTION INTRAVENOUS; SUBCUTANEOUS PRN
Status: DISCONTINUED | OUTPATIENT
Start: 2020-12-17 | End: 2020-12-18 | Stop reason: ALTCHOICE

## 2020-12-17 RX ORDER — PANTOPRAZOLE SODIUM 40 MG/1
40 TABLET, DELAYED RELEASE ORAL
Status: DISCONTINUED | OUTPATIENT
Start: 2020-12-18 | End: 2020-12-23 | Stop reason: HOSPADM

## 2020-12-17 RX ORDER — HEPARIN SODIUM 1000 [USP'U]/ML
80 INJECTION, SOLUTION INTRAVENOUS; SUBCUTANEOUS PRN
Status: DISCONTINUED | OUTPATIENT
Start: 2020-12-17 | End: 2020-12-18 | Stop reason: ALTCHOICE

## 2020-12-17 RX ADMIN — ACETAMINOPHEN 650 MG: 325 TABLET ORAL at 17:07

## 2020-12-17 RX ADMIN — Medication 2 G: at 01:50

## 2020-12-17 RX ADMIN — HEPARIN SODIUM 11 UNITS/KG/HR: 10000 INJECTION, SOLUTION INTRAVENOUS at 18:49

## 2020-12-17 RX ADMIN — Medication 2 G: at 11:21

## 2020-12-17 RX ADMIN — DULOXETINE HYDROCHLORIDE 60 MG: 60 CAPSULE, DELAYED RELEASE ORAL at 08:34

## 2020-12-17 RX ADMIN — PANTOPRAZOLE SODIUM 40 MG: 40 INJECTION, POWDER, FOR SOLUTION INTRAVENOUS at 08:34

## 2020-12-17 RX ADMIN — SODIUM CHLORIDE, PRESERVATIVE FREE 10 ML: 5 INJECTION INTRAVENOUS at 08:34

## 2020-12-17 RX ADMIN — ALTEPLASE 1 MG/HR: 2.2 INJECTION, POWDER, LYOPHILIZED, FOR SOLUTION INTRAVENOUS at 07:32

## 2020-12-17 RX ADMIN — METOPROLOL TARTRATE 25 MG: 25 TABLET, FILM COATED ORAL at 08:34

## 2020-12-17 RX ADMIN — SODIUM CHLORIDE: 9 INJECTION, SOLUTION INTRAVENOUS at 05:18

## 2020-12-17 RX ADMIN — SODIUM CHLORIDE: 9 INJECTION, SOLUTION INTRAVENOUS at 05:00

## 2020-12-17 RX ADMIN — SODIUM CHLORIDE, PRESERVATIVE FREE 10 ML: 5 INJECTION INTRAVENOUS at 19:43

## 2020-12-17 ASSESSMENT — PAIN SCALES - GENERAL
PAINLEVEL_OUTOF10: 0
PAINLEVEL_OUTOF10: 5
PAINLEVEL_OUTOF10: 0

## 2020-12-17 ASSESSMENT — PAIN DESCRIPTION - DESCRIPTORS: DESCRIPTORS: ACHING

## 2020-12-17 ASSESSMENT — PAIN DESCRIPTION - LOCATION: LOCATION: HEAD

## 2020-12-17 ASSESSMENT — PAIN DESCRIPTION - ORIENTATION: ORIENTATION: MID

## 2020-12-17 ASSESSMENT — PAIN DESCRIPTION - PAIN TYPE: TYPE: ACUTE PAIN

## 2020-12-17 NOTE — PROGRESS NOTES
Pt seen and examined  Slight decrease in hgb   Groin soft, small hematoma    Feels better    She does have dark urine - suspect old blood, sediment, it is not bright red. She has known kidney stone.   If she develops worsening hematuria will have urology see in the am.      Will start heparin drip and assuming hgb remains stable and hematuria is not worse will switch to eliquis in am  Serial h/h  Hold tx till tommorow  Discussed with nursing staff    Will leave silva catheter in place    [facility]

## 2020-12-17 NOTE — PROGRESS NOTES
Vascular Surgery Progress Note    Pt is being seen in f/u today regarding PE  Subjective  Pt s/e. No acute issues overnight. Feeling better. Denies current sob or chest annamaria. Mild right groin soreness.   Current Medications:    alteplase (ACTIVASE) 10 mg in 0.9% sodium chloride infusion 100 mL 1 mg/hr (12/17/20 0732)    And    alteplase (ACTIVASE) 10 mg in 0.9% sodium chloride infusion 100 mL 1 mg/hr (12/17/20 0732)    heparin (PORCINE) Infusion 500 Units/hr (12/16/20 1619)    And    heparin (PORCINE) Infusion 500 Units/hr (12/16/20 1616)    sodium chloride 35 mL/hr at 12/17/20 0518    And    sodium chloride 35 mL/hr at 12/17/20 0500    sodium chloride      dextrose        sennosides-docusate sodium, perflutren lipid microspheres, sodium chloride flush, acetaminophen, ondansetron, sodium chloride, labetalol, promethazine **OR** [DISCONTINUED] ondansetron, polyethylene glycol, perflutren lipid microspheres, glucose, dextrose, glucagon (rDNA), dextrose    [START ON 12/18/2020] pantoprazole  40 mg Oral QAM AC    metoprolol tartrate  25 mg Oral Daily    DULoxetine  60 mg Oral Daily    sodium chloride flush  10 mL Intravenous 2 times per day    ceFAZolin (ANCEF) IVPB  2 g Intravenous Q8H      PHYSICAL EXAM:    BP (!) 134/52   Pulse 88   Temp 98 °F (36.7 °C) (Oral)   Resp 18   Ht 5' 10\" (1.778 m)   Wt 214 lb 8.1 oz (97.3 kg)   SpO2 98%   BMI 30.78 kg/m²     Intake/Output Summary (Last 24 hours) at 12/17/2020 1326  Last data filed at 12/17/2020 1200  Gross per 24 hour   Intake 2768.4 ml   Output 1645 ml   Net 1123.4 ml        Gen awake and alert  CVS S1S2  Resp good resp excursion, nc 8 l 02  Abd soft nt nd  R LE Small hematoma is  present   Catheters and sheaths were removed  L LE  LABS:    Lab Results   Component Value Date    WBC 6.9 12/17/2020    HGB 9.7 (L) 12/17/2020    HCT 30.0 (L) 12/17/2020     (L) 12/17/2020    PROTIME 14.1 (H) 12/17/2020    INR 1.3 12/17/2020

## 2020-12-17 NOTE — PLAN OF CARE
Problem: Falls - Risk of:  Goal: Will remain free from falls  Description: Will remain free from falls  Outcome: Met This Shift     Problem: Falls - Risk of:  Goal: Absence of physical injury  Description: Absence of physical injury  Outcome: Met This Shift     Problem: Bleeding:  Goal: Will show no signs and symptoms of excessive bleeding  Description: Will show no signs and symptoms of excessive bleeding  Outcome: Ongoing     Problem: Sensory:  Goal: Ability to identify factors that increase the pain will improve  Description: Ability to identify factors that increase the pain will improve  Outcome: Met This Shift

## 2020-12-17 NOTE — PROGRESS NOTES
§ Musculoskeletal: normal range of motion, no joint swelling, deformity  § Neurologic: reflexes normal and symmetric, no cranial nerve deficit, gait, coordination and speech normal    Lines     site day    Art line   None    TLC None    PICC None    Hemoaccess None    Oxygen:    ? NC 4 L  Mechanical Ventilation:  ?      Medications     Infusions: (Fluid, Sedation, Vasopressors)  IVF:   ? NS 35 ml/hr, LR 75 ml/hr  Vasopressors  ? none  Sedation  ? none    Nutrition:   NPO, post EKOS procedure  ATB:   Antibiotics  Days   none                Labs     CBC with Differential:    Lab Results   Component Value Date    WBC 9.3 12/16/2020    RBC 3.88 12/16/2020    HGB 11.7 12/16/2020    HCT 37.4 12/16/2020     12/16/2020    MCV 96.4 12/16/2020    MCH 30.2 12/16/2020    MCHC 31.3 12/16/2020    RDW 13.9 12/16/2020    SEGSPCT 51 03/15/2013    LYMPHOPCT 18.8 12/16/2020    MONOPCT 8.2 12/16/2020    BASOPCT 0.6 12/16/2020    MONOSABS 0.65 12/16/2020    LYMPHSABS 1.48 12/16/2020    EOSABS 0.15 12/16/2020    BASOSABS 0.05 12/16/2020     BMP:    Lab Results   Component Value Date     12/16/2020    K 4.1 12/16/2020    K 4.4 12/15/2020     12/16/2020    CO2 21 12/16/2020    BUN 16 12/16/2020    LABALBU 3.6 12/16/2020    LABALBU 4.2 10/13/2011    CREATININE 1.0 12/16/2020    CALCIUM 9.3 12/16/2020    GFRAA >60 12/16/2020    LABGLOM 54 12/16/2020    GLUCOSE 106 12/16/2020    GLUCOSE 107 10/13/2011     PT/INR:    Lab Results   Component Value Date    PROTIME 12.8 12/16/2020    INR 1.1 12/16/2020     PTT:    Lab Results   Component Value Date    APTT 65.9 12/16/2020   [APTT}    Imaging Studies:      Resident's Assessment and Plan The patient is a 68 y.o. female with past medical history of hypertension, right lower extremity DVT status post right knee replacement, right total nephrectomy, left nephrolithiasis, bilateral cataracts, glaucoma status post stent placement, presented with hypotension, right leg pain and swelling, lightheadedness x3 days.      Pulmonary embolism, likely secondary to DVT of the right lower extremity, likely provoked  S/p EKOS, and on heparin  Pt presented with palpitation, lightheadedness, and Rt leg swelling and pain  CTA of the chest showed that extensive bilateral PE, within distal right main pulmonary artery extending into pulmonary artery branches of the right, extensive within t the course of the left main pulmonary artery into the pulmonary artery branches of the left lung. Currently hemodynamically stable  Troponin is nl, will trend  Pro-BNP, 6890  EKG NSR  ECHO showed Right ventricle global systolic function is severely reduced; the RV free wall is nearly akinetic. TAPSE 0.9 cm. RV/LV ratio > 0.9. RVSP is 35 mmHg.   on heparin gtt, titrate to PTT 70-90  VS consulted, proceeded with EKOS,   · RV to LV ratio is > 0.9  · RV is moderately dilated on echocardiogram  · RV has severely reduced systolic function based of TAPSE of 0.9  · Pro BNP is 6890  · Troponin is 0.02  · Pt is  tachycardic - HR currently 105  · Pt is not hypotensive - SBP currently 120  · I feel that patient zena  a good candidate for placement of pulmonary embolism TPA lysis catheters   s/p procedure, pt stable  · Remove catheters in 12 hours  · Observe in ICU     DVT of the right lower extremity, likely provoked  Hx of DVT post Rt knee surgery  Per pt statement, was not on any anticoagulation, but was prescribed with asa  This episode is likely provoked d/t inactivity, pt states not very active of the last 2 weeks d/t lightheadedness  Presented with leg swelling and pain US of the lower extremity shows occlusive thrombosis within the right lower extremity extending from the posterior tibial vein to the proximal femoral vein, no evidence of the left side DVT. On heparin gtt  monitor        GEORGE stage I, likely d/t poor oral intake  Elevation of Cr 1.2  Baseline cr 1  Awaiting urine elctrolytes and Cr and BUN for FeNa and FeUrea  Given 1.5 L IVF in ED  Currently on LR 75 ml/hr  De 17, Cr 1.0  Monitor BMP     Hx of HTN  Hold home med lopressor     Hx of fibromyalgia   Hold home med duloxetime     PT/OT evaluation: not indicated now  DVT prophylaxis/ GI prophylaxis: on heparin gtt for PE/protonix  Disposition: ICU admission and management    Lisa Stephens MD, PGY-1    Attending physician: Dr. Ayesha Pelaez    I personally saw, examined and provided care for the patient. Radiographs, labs and medication list were reviewed by me independently. I spoke with bedside nursing, therapists and consultants. Critical care services and times documented are independent of procedures and multidisciplinary rounds with Residents. Additionally comprehensive, multidisciplinary rounds were conducted with the MICU team. The case was discussed in detail and plans for care were established. Review of Residents documentation was conducted and revisions were made as appropriate. I agree with the above documented exam, problem list and plan of care.     S/P ekos  WATCH HB  Anticoagulation   Vascular following  TOMY ALVARADO

## 2020-12-17 NOTE — PROGRESS NOTES
EKOS catheter removal    EKOS catheter removed without difficulty. Pressure held for 10mins, hemostasis achieved. Clean dressing applied. Check site frequently. 3 hours bedrest. Bedside RN notified.          Saurabh GARCIA

## 2020-12-17 NOTE — PROGRESS NOTES
Psyllium (VEGETABLE LAXATIVE PO), Take by mouth    Allergies:    Penicillins    Social History:    reports that she has never smoked. She has never used smokeless tobacco. She reports that she does not drink alcohol or use drugs. Family History:   family history includes Other in her mother; Stroke in her mother. REVIEW OF SYSTEMS:  As above in the HPI, otherwise negative    PHYSICAL EXAM:    Vitals:  BP (!) 146/101   Pulse 102   Temp 97.5 °F (36.4 °C) (Temporal)   Resp 19   Ht 5' 10\" (1.778 m)   Wt 214 lb 8.1 oz (97.3 kg)   SpO2 96%   BMI 30.78 kg/m²     General:  Awake, alert, oriented X 3. Well developed, well nourished, well groomed. No apparent distress. HEENT:  Normocephalic, atraumatic. Pupils equal, round, reactive to light. No scleral icterus. No conjunctival injection. Normal lips, teeth, and gums. No nasal discharge. Neck:  Supple  Heart:  RRR, no murmurs, gallops, rubs  Lungs:  CTA bilaterally, bilat symmetrical expansion, no wheeze, rales, or rhonchi  Abdomen:   Bowel sounds present, soft, nontender, no masses, no organomegaly, no peritoneal signs  Extremities:  No clubbing, cyanosis, or edema  Skin:  Warm and dry, no open lesions or rash  Neuro:  Cranial nerves 2-12 intact, no focal deficits  Breast: deferred  Rectal: deferred  Genitalia:  deferred    LABS:  Data reviewed in detail    ASSESSMENT:      Principal Problem:    Acute saddle pulmonary embolism with acute cor pulmonale (HCC)    PE (pulmonary thromboembolism) ([address] with RV failure  Post op day 1 ekos  DVT right lower extremity  I am not sure with the earlier this is a provoked or nonprovoked  Earlier knee replacement was 10 years ago  Did have a fall about 4 weeks ago since then she has been negative  This could make a case for a provoked thrombus  Multiple comorbidities  Reviewed in detail with the patient      PLAN:  Heparin infusion  Vascular consult appreciated  Check echocardiogram  Resume home medications Hold all nonsteroidals and aspirins  Reviewed in detail with the patient  Suspect she will be on anticoagulant therapy like Eliquis for the next 6 months    Escobar Guadalupe  7:50 AM  12/17/2020

## 2020-12-18 LAB
ALBUMIN SERPL-MCNC: 3 G/DL (ref 3.5–5.2)
ALP BLD-CCNC: 28 U/L (ref 35–104)
ALT SERPL-CCNC: 9 U/L (ref 0–32)
ANION GAP SERPL CALCULATED.3IONS-SCNC: 6 MMOL/L (ref 7–16)
APTT: 143 SEC (ref 24.5–35.1)
APTT: 45.8 SEC (ref 24.5–35.1)
AST SERPL-CCNC: 14 U/L (ref 0–31)
BASOPHILS ABSOLUTE: 0.02 E9/L (ref 0–0.2)
BASOPHILS RELATIVE PERCENT: 0.3 % (ref 0–2)
BILIRUB SERPL-MCNC: 0.4 MG/DL (ref 0–1.2)
BUN BLDV-MCNC: 14 MG/DL (ref 8–23)
CALCIUM SERPL-MCNC: 9 MG/DL (ref 8.6–10.2)
CHLORIDE BLD-SCNC: 109 MMOL/L (ref 98–107)
CO2: 25 MMOL/L (ref 22–29)
CREAT SERPL-MCNC: 1 MG/DL (ref 0.5–1)
EOSINOPHILS ABSOLUTE: 0.29 E9/L (ref 0.05–0.5)
EOSINOPHILS RELATIVE PERCENT: 5 % (ref 0–6)
GFR AFRICAN AMERICAN: >60
GFR NON-AFRICAN AMERICAN: 54 ML/MIN/1.73
GLUCOSE BLD-MCNC: 105 MG/DL (ref 74–99)
HCT VFR BLD CALC: 32 % (ref 34–48)
HEMOGLOBIN: 10.3 G/DL (ref 11.5–15.5)
IMMATURE GRANULOCYTES #: 0.03 E9/L
IMMATURE GRANULOCYTES %: 0.5 % (ref 0–5)
LYMPHOCYTES ABSOLUTE: 0.95 E9/L (ref 1.5–4)
LYMPHOCYTES RELATIVE PERCENT: 16.3 % (ref 20–42)
MAGNESIUM: 2.1 MG/DL (ref 1.6–2.6)
MCH RBC QN AUTO: 30.9 PG (ref 26–35)
MCHC RBC AUTO-ENTMCNC: 32.2 % (ref 32–34.5)
MCV RBC AUTO: 96.1 FL (ref 80–99.9)
MONOCYTES ABSOLUTE: 0.52 E9/L (ref 0.1–0.95)
MONOCYTES RELATIVE PERCENT: 8.9 % (ref 2–12)
NEUTROPHILS ABSOLUTE: 4.02 E9/L (ref 1.8–7.3)
NEUTROPHILS RELATIVE PERCENT: 69 % (ref 43–80)
PDW BLD-RTO: 13.7 FL (ref 11.5–15)
PHOSPHORUS: 3 MG/DL (ref 2.5–4.5)
PLATELET # BLD: 115 E9/L (ref 130–450)
PMV BLD AUTO: 10.9 FL (ref 7–12)
POTASSIUM SERPL-SCNC: 4.3 MMOL/L (ref 3.5–5)
RBC # BLD: 3.33 E12/L (ref 3.5–5.5)
SODIUM BLD-SCNC: 140 MMOL/L (ref 132–146)
TOTAL PROTEIN: 5.6 G/DL (ref 6.4–8.3)
WBC # BLD: 5.8 E9/L (ref 4.5–11.5)

## 2020-12-18 PROCEDURE — 6360000002 HC RX W HCPCS: Performed by: SURGERY

## 2020-12-18 PROCEDURE — 97530 THERAPEUTIC ACTIVITIES: CPT

## 2020-12-18 PROCEDURE — 2700000000 HC OXYGEN THERAPY PER DAY

## 2020-12-18 PROCEDURE — 97162 PT EVAL MOD COMPLEX 30 MIN: CPT

## 2020-12-18 PROCEDURE — 83735 ASSAY OF MAGNESIUM: CPT

## 2020-12-18 PROCEDURE — 80053 COMPREHEN METABOLIC PANEL: CPT

## 2020-12-18 PROCEDURE — 6370000000 HC RX 637 (ALT 250 FOR IP): Performed by: INTERNAL MEDICINE

## 2020-12-18 PROCEDURE — 6370000000 HC RX 637 (ALT 250 FOR IP): Performed by: NURSE PRACTITIONER

## 2020-12-18 PROCEDURE — 2140000000 HC CCU INTERMEDIATE R&B

## 2020-12-18 PROCEDURE — 84100 ASSAY OF PHOSPHORUS: CPT

## 2020-12-18 PROCEDURE — 85730 THROMBOPLASTIN TIME PARTIAL: CPT

## 2020-12-18 PROCEDURE — 2580000003 HC RX 258: Performed by: SURGERY

## 2020-12-18 PROCEDURE — 99233 SBSQ HOSP IP/OBS HIGH 50: CPT | Performed by: INTERNAL MEDICINE

## 2020-12-18 PROCEDURE — 85025 COMPLETE CBC W/AUTO DIFF WBC: CPT

## 2020-12-18 PROCEDURE — 6370000000 HC RX 637 (ALT 250 FOR IP): Performed by: SURGERY

## 2020-12-18 RX ADMIN — APIXABAN 5 MG: 5 TABLET, FILM COATED ORAL at 21:53

## 2020-12-18 RX ADMIN — ACETAMINOPHEN 650 MG: 325 TABLET ORAL at 00:23

## 2020-12-18 RX ADMIN — SODIUM CHLORIDE, PRESERVATIVE FREE 10 ML: 5 INJECTION INTRAVENOUS at 21:53

## 2020-12-18 RX ADMIN — APIXABAN 5 MG: 5 TABLET, FILM COATED ORAL at 09:52

## 2020-12-18 RX ADMIN — SODIUM CHLORIDE, PRESERVATIVE FREE 10 ML: 5 INJECTION INTRAVENOUS at 09:50

## 2020-12-18 RX ADMIN — PANTOPRAZOLE SODIUM 40 MG: 40 TABLET, DELAYED RELEASE ORAL at 09:50

## 2020-12-18 RX ADMIN — DULOXETINE HYDROCHLORIDE 60 MG: 60 CAPSULE, DELAYED RELEASE ORAL at 09:50

## 2020-12-18 RX ADMIN — METOPROLOL TARTRATE 25 MG: 25 TABLET, FILM COATED ORAL at 09:50

## 2020-12-18 RX ADMIN — HEPARIN SODIUM 3890 UNITS: 1000 INJECTION INTRAVENOUS; SUBCUTANEOUS at 00:35

## 2020-12-18 ASSESSMENT — PAIN SCALES - GENERAL
PAINLEVEL_OUTOF10: 4
PAINLEVEL_OUTOF10: 0
PAINLEVEL_OUTOF10: 0
PAINLEVEL_OUTOF10: 10
PAINLEVEL_OUTOF10: 0
PAINLEVEL_OUTOF10: 0

## 2020-12-18 ASSESSMENT — PAIN DESCRIPTION - ONSET: ONSET: GRADUAL

## 2020-12-18 ASSESSMENT — PAIN DESCRIPTION - LOCATION: LOCATION: GENERALIZED

## 2020-12-18 NOTE — PROGRESS NOTES
[address] [facility]  Department of Internal Medicine   Internal Medicine Residency   MICU Progress Note    Patient:  Latasha Dobbs 68 y.o. female  MRN: [id number]     Date of Service: 12/18/2020    Allergy: Penicillins  Cc follow up pe  Subjective     Pt seen and examined in the room. S/p EKOS. VS stable, HR 93, /77. Pt has no additional complaint except mild Rt leg pain and swelling that are improving, denies SOB, palpitation, chest pain.     No acute overnight event    Objective     VS: /77   Pulse 93   Temp 98.5 °F (36.9 °C)   Resp 26   Ht 5' 10\" (1.778 m)   Wt 214 lb 8.1 oz (97.3 kg)   SpO2 96%   BMI 30.78 kg/m²           I & O - 24hr:     Intake/Output Summary (Last 24 hours) at 12/18/2020 5440  Last data filed at 12/18/2020 0600  Gross per 24 hour   Intake 337.6 ml   Output 1530 ml   Net -1192.4 ml       Physical Exam:  § General Appearance: alert and oriented to person, place and time, well developed and well- nourished, in no acute distress  § Skin: warm and dry, no rash or erythema  § Head: normocephalic and atraumatic  § Eyes: pupils equal, round, and reactive to light, extraocular eye movements intact, conjunctivae normal  § ENT: tympanic membrane, external ear and ear canal normal bilaterally, nose without deformity, nasal mucosa and turbinates normal without polyps  § Neck: supple and non-tender without mass, no thyromegaly or thyroid nodules, no cervical lymphadenopathy  § Pulmonary/Chest: clear to auscultation bilaterally- no wheezes, rales or rhonchi, normal air movement, no respiratory distress  § Cardiovascular: Tachycardia, regular rhythm, normal S1 and S2, no murmurs, rubs, clicks, or gallops, distal pulses intact, no carotid bruits  § Abdomen: soft, non-tender, non-distended, normal bowel sounds, no masses or organomegaly  § Extremities: no cyanosis, clubbing, mild  tenderness of the right leg, negative erythematous, mild warmness, Lt LE nl The patient is a 68 y.o. female with past medical history of hypertension, right lower extremity DVT status post right knee replacement, right total nephrectomy, left nephrolithiasis, bilateral cataracts, glaucoma status post stent placement, presented with hypotension, right leg pain and swelling, lightheadedness x3 days.        Pulmonary embolism, likely secondary to DVT of the right lower extremity, likely provoked  S/p EKOS, and on heparin  Pt presented with palpitation, lightheadedness, and Rt leg swelling and pain  CTA of the chest showed that extensive bilateral PE, within distal right main pulmonary artery extending into pulmonary artery branches of the right, extensive within t the course of the left main pulmonary artery into the pulmonary artery branches of the left lung. Currently hemodynamically stable  Troponin is nl, will trend  Pro-BNP, 6890  EKG NSR  ECHO showed Right ventricle global systolic function is severely reduced; the RV free wall is nearly akinetic. TAPSE 0.9 cm. RV/LV ratio > 0.9. RVSP is 35 mmHg.   on heparin gtt, titrate to PTT 70-90  VS consulted, proceeded with EKOS,   · RV to LV ratio is > 0.9  · RV is moderately dilated on echocardiogram  · RV has severely reduced systolic function based of TAPSE of 0.9  · Pro BNP is 6890  · Troponin is 0.02  · Pt is  tachycardic - HR currently 105  · Pt is not hypotensive - SBP currently 120  · I feel that patient zena  a good candidate for placement of pulmonary embolism TPA lysis catheters   s/p procedure, pt stable  · Remove catheters in 12 hours  · Observe in ICU  Dec 18, pt stable, no active complaint, will be transitioned to DOAC, elliquis, ready for transfer to medical floor       DVT of the right lower extremity, likely provoked  Hx of DVT post Rt knee surgery  Per pt statement, was not on any anticoagulation, but was prescribed with asa

## 2020-12-18 NOTE — PROGRESS NOTES
Physical Therapy    Physical Therapy Initial Assessment     Name: Kayleigh Moreno  : 1940  MRN: [id number]    Referring Provider:  Yanique Ordonez MD    Date of Service: 2020    Evaluating PT:  Vidyapaul Denver, PT, DPT [id number]     Room #:  5421/1528-Q  Diagnosis:  PE s/p EKOS  PMHx/PSHx:  Acute saddle PE with acute cor pulmonale, arthritis, cystitis, depression, DVT, glaucoma, hernia, HTN, kidney stones, sinus infection, superficial phlebitis of leg   Procedure/Surgery:  Placement of 2 EKOS Lysis catheter and US wire (12 cm infusion length) in bilateral pulmonary artery    Precautions:  Falls   Equipment Needs:  NA    SUBJECTIVE:    Pt lives alone in a 2 story home with no stairs to enter. Bedroom and bathroom are on the 1st level. Pt ambulated with no AD PTA. Owns [employer]. Independent at baseline. Her sister lives in the same complex and can assist if needed. OBJECTIVE:   Initial Evaluation  Date: 20 Treatment Short Term/ Long Term   Goals   AM-PAC 6 Clicks      Was pt agreeable to Eval/treatment? Yes     Does pt have pain? No c/o pain at rest     Bed Mobility  Rolling: NT  Supine to sit: Min A  Sit to supine: NT  Scooting: Min A to EOB   Rolling: Independent   Supine to sit: Independent   Sit to supine: Independent   Scooting: Independent    Transfers Sit to stand: Min A  Stand to sit: Min A  Stand pivot: SBA with [employer]   Sit to stand: Independent   Stand to sit:  Independent   Stand pivot: Modified Independent with AAD   Ambulation    80 feet with [employer] Min A progressing to SBA  >250 feet with AAD Modified Independent     Stair negotiation: ascended and descended  NT  >2 steps with 1 rail Modified Independent     ROM BUE:  WFL  BLE:  WFL     Strength BUE:  NT  BLE:  Grossly 4/5     Balance Sitting EOB:  SBA static; Min A dynamic   Dynamic Standing:  Min A with [employer]   Sitting EOB:  Independent   Dynamic Standing:  Modified Independent       Pt is A & O x 4

## 2020-12-18 NOTE — PLAN OF CARE
Problem: Skin Integrity:  Goal: Will show no infection signs and symptoms  12/18/2020 0418 by Mariaelena Whitehead RN  Outcome: Met This Shift     Problem: Skin Integrity:  Goal: Absence of new skin breakdown  12/18/2020 0418 by Mariaelena Whitehead RN  Outcome: Met This Shift     Problem: Falls - Risk of:  Goal: Will remain free from falls  12/18/2020 0418 by Mariaelena Whitehead RN  Outcome: Met This Shift     Problem: Falls - Risk of:  Goal: Absence of physical injury  12/18/2020 0418 by Mariaelena Whitehead RN  Outcome: Met This Shift     Problem: Bleeding:  Goal: Will show no signs and symptoms of excessive bleeding  Outcome: Met This Shift

## 2020-12-18 NOTE — PROGRESS NOTES
Vascular Surgery Progress Note    Pt is being seen in f/u today regarding PE  Subjective  Pt s/e. No acute issues overnight. Denies current sob or chest pain. Mild right groin soreness. Current Medications:    heparin (PORCINE) Infusion Stopped (12/18/20 0730)    dextrose        heparin (porcine), heparin (porcine), sennosides-docusate sodium, sodium chloride flush, acetaminophen, ondansetron, labetalol, promethazine **OR** [DISCONTINUED] ondansetron, polyethylene glycol, glucose, dextrose, glucagon (rDNA), dextrose    pantoprazole  40 mg Oral QAM AC    metoprolol tartrate  25 mg Oral Daily    DULoxetine  60 mg Oral Daily    sodium chloride flush  10 mL Intravenous 2 times per day      PHYSICAL EXAM:    /77   Pulse 93   Temp 98.5 °F (36.9 °C)   Resp 26   Ht 5' 10\" (1.778 m)   Wt 214 lb 8.1 oz (97.3 kg)   SpO2 96%   BMI 30.78 kg/m²     Intake/Output Summary (Last 24 hours) at 12/18/2020 0903  Last data filed at 12/18/2020 0600  Gross per 24 hour   Intake 337.6 ml   Output 1380 ml   Net -1042.4 ml        Gen awake and alert  CVS S1S2  Resp good resp excursion, nc 3 l 02  Abd soft nt nd   urine clearing, slight brown tinged   R LE Mild ecchymosis right groin. Small hematoma is present, stable      L LE no edema     LABS:    Lab Results   Component Value Date    WBC 5.8 12/18/2020    HGB 10.3 (L) 12/18/2020    HCT 32.0 (L) 12/18/2020     (L) 12/18/2020    PROTIME 14.1 (H) 12/17/2020    INR 1.3 12/17/2020    APTT 143.0 (H) 12/18/2020    K 4.3 12/18/2020    BUN 14 12/18/2020    CREATININE 1.0 12/18/2020       A/P Sub Massive PE s/p PE lysis with EKOS  Patient does have pain at access point  Hemodynamics are improved  Resp status is significantly improved  Continue Diet   UO adequate, urine clearing.    Hgb stabilized, Plt stabilized  Stop heparin and start eliquis 5 mg BID  OK for transfer to stepdown today from vascular standpoint    Right LE DVT  · Etiology is likely stasis · Therapeutic anticoagulation for minimum 12 months, possibly lifetime  · No indication for placement of IVC Filter  · Elevate Bilateral LE while in bed or sitting  · Tubigrip  Bilateral LE while in the hospital  · Compression stockings 8-15 high kjnee mm hg wear daily- script left on chart  · F/U as outpatient in 1 months with CTA chest and ECHO  · Call if patient develops worsening of swelling or wounds  · discussed with patient pathophysiology of DVT, PE, and thrombophlebitis   · All ?s answered    Anticoagulation Options Discussion  · Pt currently on tpa  · Patient was explained that all anticoagulants have a risk of significant bleeding  · They understood not to participate in high risk activities including but not limited to, riding motorcycle, use of ladders, going up on roofs, tree stands, contact sports  · They understood that any significant head trauma could be life threatening  · We discussed options, risks, benefits, potential negative outcomes  · Coumadin   · Heparin vs. lovenox bridge  · necessity of blood draws and potential dose adjustments with coumadin  · dietary restrictions  · Xarelto Daily Dosing but must eat a full meal  · Eliquis BID dosing, less risk of bleeding  · explained issues of no antidote with xeralto or Eliquis  · pt agreeable to using eliquis - recheck hgb - if stable will start later today  · patient understands that if they are pregnant or become pregnant that these medications could cause significant problems to/for baby  · Monitor platelets for thrombocytopenia    Pt seen and plan reviewed with Dr. Rena Dobson.      Thelma Boswell, CNP

## 2020-12-18 NOTE — PROGRESS NOTES
Toi Ny MD FACP   progress notes      CHIEF COMPLAINT:    Right leg pain and shortness of breath      HISTORY OF PRESENT ILLNESS:    46-year-old woman presented to hospital emergency room with acute onset of right leg pain associated with shortness of breath  Spoke with the ER physician at the time of admission  She was found to have DVT right leg and hypoxia related to multiple pulmonary emboli  She denied recent travel  Admits to having had a fall about a month ago  Since then she has been somewhat inactive  Found to have DVT  Admitted for further management  Data reviewed in detail  Fairly comfortable this morning  Post op day 2 ekos    Past Medical History:    Past Medical History:   Diagnosis Date    Acute saddle pulmonary embolism with acute cor pulmonale ([address] 12/16/2020    Arthritis     Cataracts, bilateral     Cystitis     Depression     DVT of lower limb, acute ([address] 03/15/2013    Glaucoma     s/p stent placement    Hernia     HIGH CHOLESTEROL     Hypertension     Kidney stones     Sinus infection     Superficial phlebitis of leg 10/30/2014       Past Surgical History:    Past Surgical History:   Procedure Laterality Date    APPENDECTOMY      BLADDER STONE REMOVAL      COLONOSCOPY      DILATION AND CURETTAGE OF UTERUS      HERNIA REPAIR      TOTAL KNEE ARTHROPLASTY      right    TOTAL NEPHRECTOMY      right       Medications Prior to Admission:    Medications Prior to Admission: simvastatin (ZOCOR) 40 MG tablet, Take 1 tablet by mouth nightly  DULoxetine (CYMBALTA) 60 MG extended release capsule, Take 1 capsule by mouth daily  meloxicam (MOBIC) 15 MG tablet, Take 1 tablet by mouth daily  metoprolol tartrate (LOPRESSOR) 25 MG tablet, Take 1 tablet by mouth daily  docusate sodium (COLACE) 100 MG capsule, Take 100 mg by mouth  timolol (TIMOPTIC) 0.5 % ophthalmic solution,   Handicap Placard MISC, by Does not apply route Duration 5 years Psyllium (VEGETABLE LAXATIVE PO), Take by mouth    Allergies:    Penicillins    Social History:    reports that she has never smoked. She has never used smokeless tobacco. She reports that she does not drink alcohol or use drugs. Family History:   family history includes Other in her mother; Stroke in her mother. REVIEW OF SYSTEMS:  As above in the HPI, otherwise negative    PHYSICAL EXAM:    Vitals:  /77   Pulse 93   Temp 98.5 °F (36.9 °C)   Resp 26   Ht 5' 10\" (1.778 m)   Wt 214 lb 8.1 oz (97.3 kg)   SpO2 96%   BMI 30.78 kg/m²     General:  Awake, alert, oriented X 3. Well developed, well nourished, well groomed. No apparent distress. HEENT:  Normocephalic, atraumatic. Pupils equal, round, reactive to light. No scleral icterus. No conjunctival injection. Normal lips, teeth, and gums. No nasal discharge. Neck:  Supple  Heart:  RRR, no murmurs, gallops, rubs  Lungs:  CTA bilaterally, bilat symmetrical expansion, no wheeze, rales, or rhonchi  Abdomen:   Bowel sounds present, soft, nontender, no masses, no organomegaly, no peritoneal signs  Extremities:  No clubbing, cyanosis, or edema  Skin:  Warm and dry, no open lesions or rash  Neuro:  Cranial nerves 2-12 intact, no focal deficits  Breast: deferred  Rectal: deferred  Genitalia:  deferred    LABS:  Data reviewed in detail    ASSESSMENT:    Hematuria resolving  Principal Problem:    Acute saddle pulmonary embolism with acute cor pulmonale (HCC)    PE (pulmonary thromboembolism) ([facility] [address] with RV failure  Post op day 2 ekos  DVT right lower extremity  I am not sure with the earlier this is a provoked or nonprovoked  Earlier knee replacement was 10 years ago  Did have a fall about 4 weeks ago since then she has been negative  This could make a case for a provoked thrombus  Multiple comorbidities  Reviewed in detail with the patient      PLAN:  May transfer  ambulate  Heparin infusion/may be switched to eliquis  Vascular consult appreciated Resume home medications  Hold all nonsteroidals and aspirins  Reviewed in detail with the patient  Suspect she will be on anticoagulant therapy like Eliquis for the next 6 months    Shruthi Morris  7:56 AM  12/18/2020

## 2020-12-19 LAB
ALBUMIN SERPL-MCNC: 3.2 G/DL (ref 3.5–5.2)
ALP BLD-CCNC: 26 U/L (ref 35–104)
ALT SERPL-CCNC: 8 U/L (ref 0–32)
ANION GAP SERPL CALCULATED.3IONS-SCNC: 9 MMOL/L (ref 7–16)
AST SERPL-CCNC: 13 U/L (ref 0–31)
BASOPHILS ABSOLUTE: 0.03 E9/L (ref 0–0.2)
BASOPHILS RELATIVE PERCENT: 0.5 % (ref 0–2)
BILIRUB SERPL-MCNC: 0.4 MG/DL (ref 0–1.2)
BUN BLDV-MCNC: 17 MG/DL (ref 8–23)
CALCIUM SERPL-MCNC: 9.2 MG/DL (ref 8.6–10.2)
CHLORIDE BLD-SCNC: 106 MMOL/L (ref 98–107)
CO2: 23 MMOL/L (ref 22–29)
CREAT SERPL-MCNC: 1.1 MG/DL (ref 0.5–1)
EOSINOPHILS ABSOLUTE: 0.32 E9/L (ref 0.05–0.5)
EOSINOPHILS RELATIVE PERCENT: 5.6 % (ref 0–6)
GFR AFRICAN AMERICAN: 58
GFR NON-AFRICAN AMERICAN: 48 ML/MIN/1.73
GLUCOSE BLD-MCNC: 100 MG/DL (ref 74–99)
HCT VFR BLD CALC: 30.8 % (ref 34–48)
HEMOGLOBIN: 10.1 G/DL (ref 11.5–15.5)
IMMATURE GRANULOCYTES #: 0.03 E9/L
IMMATURE GRANULOCYTES %: 0.5 % (ref 0–5)
LYMPHOCYTES ABSOLUTE: 1.15 E9/L (ref 1.5–4)
LYMPHOCYTES RELATIVE PERCENT: 20 % (ref 20–42)
MAGNESIUM: 2 MG/DL (ref 1.6–2.6)
MCH RBC QN AUTO: 31.2 PG (ref 26–35)
MCHC RBC AUTO-ENTMCNC: 32.8 % (ref 32–34.5)
MCV RBC AUTO: 95.1 FL (ref 80–99.9)
METER GLUCOSE: 99 MG/DL (ref 74–99)
MONOCYTES ABSOLUTE: 0.6 E9/L (ref 0.1–0.95)
MONOCYTES RELATIVE PERCENT: 10.4 % (ref 2–12)
NEUTROPHILS ABSOLUTE: 3.62 E9/L (ref 1.8–7.3)
NEUTROPHILS RELATIVE PERCENT: 63 % (ref 43–80)
PDW BLD-RTO: 13.9 FL (ref 11.5–15)
PHOSPHORUS: 2.3 MG/DL (ref 2.5–4.5)
PLATELET # BLD: 141 E9/L (ref 130–450)
PMV BLD AUTO: 10.6 FL (ref 7–12)
POTASSIUM SERPL-SCNC: 3.9 MMOL/L (ref 3.5–5)
RBC # BLD: 3.24 E12/L (ref 3.5–5.5)
SODIUM BLD-SCNC: 138 MMOL/L (ref 132–146)
TOTAL PROTEIN: 5.6 G/DL (ref 6.4–8.3)
WBC # BLD: 5.8 E9/L (ref 4.5–11.5)

## 2020-12-19 PROCEDURE — 6370000000 HC RX 637 (ALT 250 FOR IP): Performed by: INTERNAL MEDICINE

## 2020-12-19 PROCEDURE — 36415 COLL VENOUS BLD VENIPUNCTURE: CPT

## 2020-12-19 PROCEDURE — 85025 COMPLETE CBC W/AUTO DIFF WBC: CPT

## 2020-12-19 PROCEDURE — 2580000003 HC RX 258: Performed by: SURGERY

## 2020-12-19 PROCEDURE — 80053 COMPREHEN METABOLIC PANEL: CPT

## 2020-12-19 PROCEDURE — 6370000000 HC RX 637 (ALT 250 FOR IP): Performed by: NURSE PRACTITIONER

## 2020-12-19 PROCEDURE — 99233 SBSQ HOSP IP/OBS HIGH 50: CPT | Performed by: INTERNAL MEDICINE

## 2020-12-19 PROCEDURE — 82962 GLUCOSE BLOOD TEST: CPT

## 2020-12-19 PROCEDURE — 83735 ASSAY OF MAGNESIUM: CPT

## 2020-12-19 PROCEDURE — 6370000000 HC RX 637 (ALT 250 FOR IP): Performed by: SURGERY

## 2020-12-19 PROCEDURE — 84100 ASSAY OF PHOSPHORUS: CPT

## 2020-12-19 PROCEDURE — 2140000000 HC CCU INTERMEDIATE R&B

## 2020-12-19 RX ORDER — BISACODYL 10 MG
10 SUPPOSITORY, RECTAL RECTAL ONCE
Status: DISCONTINUED | OUTPATIENT
Start: 2020-12-19 | End: 2020-12-23 | Stop reason: HOSPADM

## 2020-12-19 RX ADMIN — DULOXETINE HYDROCHLORIDE 60 MG: 60 CAPSULE, DELAYED RELEASE ORAL at 09:00

## 2020-12-19 RX ADMIN — ACETAMINOPHEN 650 MG: 325 TABLET ORAL at 11:09

## 2020-12-19 RX ADMIN — PANTOPRAZOLE SODIUM 40 MG: 40 TABLET, DELAYED RELEASE ORAL at 07:01

## 2020-12-19 RX ADMIN — METOPROLOL TARTRATE 25 MG: 25 TABLET, FILM COATED ORAL at 09:01

## 2020-12-19 RX ADMIN — SODIUM CHLORIDE, PRESERVATIVE FREE 10 ML: 5 INJECTION INTRAVENOUS at 09:01

## 2020-12-19 RX ADMIN — APIXABAN 5 MG: 5 TABLET, FILM COATED ORAL at 21:42

## 2020-12-19 RX ADMIN — SODIUM CHLORIDE, PRESERVATIVE FREE 10 ML: 5 INJECTION INTRAVENOUS at 21:42

## 2020-12-19 RX ADMIN — APIXABAN 5 MG: 5 TABLET, FILM COATED ORAL at 09:01

## 2020-12-19 RX ADMIN — POLYETHYLENE GLYCOL 3350 17 G: 17 POWDER, FOR SOLUTION ORAL at 05:27

## 2020-12-19 ASSESSMENT — PAIN SCALES - GENERAL
PAINLEVEL_OUTOF10: 6
PAINLEVEL_OUTOF10: 0

## 2020-12-19 NOTE — PROGRESS NOTES
Psyllium (VEGETABLE LAXATIVE PO), Take by mouth    Allergies:    Penicillins    Social History:    reports that she has never smoked. She has never used smokeless tobacco. She reports that she does not drink alcohol or use drugs. Family History:   family history includes Other in her mother; Stroke in her mother. REVIEW OF SYSTEMS:  As above in the HPI, otherwise negative    PHYSICAL EXAM:    Vitals:  /70   Pulse 97   Temp 97.8 °F (36.6 °C) (Oral)   Resp 20   Ht 5' 10\" (1.778 m)   Wt 204 lb 3.2 oz (92.6 kg)   SpO2 92%   BMI 29.30 kg/m²     General:  Awake, alert, oriented X 3. Well developed, well nourished, well groomed. No apparent distress. HEENT:  Normocephalic, atraumatic. Pupils equal, round, reactive to light. No scleral icterus. No conjunctival injection. Normal lips, teeth, and gums. No nasal discharge. Neck:  Supple  Heart:  RRR, no murmurs, gallops, rubs  Lungs:  CTA bilaterally, bilat symmetrical expansion, no wheeze, rales, or rhonchi  Abdomen:   Bowel sounds present, soft, nontender, no masses, no organomegaly, no peritoneal signs  Extremities:  No clubbing, cyanosis, or edema  Groin has few ecchymotic areas  Skin:  Warm and dry, no open lesions or rash  Neuro:  Cranial nerves 2-12 intact, no focal deficits  Breast: deferred  Rectal: deferred  Genitalia:  deferred    LABS:  Data reviewed in detail    ASSESSMENT:    Hematuria resolving  Principal Problem:    Acute saddle pulmonary embolism with acute cor pulmonale (HCC)    PE (pulmonary thromboembolism) ([facility] [address] with RV failure  Post op day 3 ekos  DVT right lower extremity  I am not sure with the earlier this is a provoked or nonprovoked  Earlier knee replacement was 10 years ago  Did have a fall about 4 weeks ago since then she has been negative  This could make a case for a provoked thrombus  Multiple comorbidities  Reviewed in detail with the patient      PLAN:  Ambulate  Easley catheter out On oral anticoagulation with Eliquis  Vascular consult appreciated  Hold all nonsteroidals and aspirins  Reviewed in detail with the patient  Suspect she will be on anticoagulant therapy like Eliquis for the next 6 months  Likely home in 24 h    Craig Flynn  9:45 AM  12/19/2020

## 2020-12-20 LAB
ALBUMIN SERPL-MCNC: 3.3 G/DL (ref 3.5–5.2)
ALP BLD-CCNC: 27 U/L (ref 35–104)
ALT SERPL-CCNC: 14 U/L (ref 0–32)
ANION GAP SERPL CALCULATED.3IONS-SCNC: 12 MMOL/L (ref 7–16)
AST SERPL-CCNC: 26 U/L (ref 0–31)
BASOPHILS ABSOLUTE: 0.02 E9/L (ref 0–0.2)
BASOPHILS RELATIVE PERCENT: 0.4 % (ref 0–2)
BILIRUB SERPL-MCNC: 0.4 MG/DL (ref 0–1.2)
BUN BLDV-MCNC: 16 MG/DL (ref 8–23)
CALCIUM SERPL-MCNC: 9.6 MG/DL (ref 8.6–10.2)
CHLORIDE BLD-SCNC: 107 MMOL/L (ref 98–107)
CO2: 24 MMOL/L (ref 22–29)
CREAT SERPL-MCNC: 1 MG/DL (ref 0.5–1)
EOSINOPHILS ABSOLUTE: 0.3 E9/L (ref 0.05–0.5)
EOSINOPHILS RELATIVE PERCENT: 6.5 % (ref 0–6)
GFR AFRICAN AMERICAN: >60
GFR NON-AFRICAN AMERICAN: 54 ML/MIN/1.73
GLUCOSE BLD-MCNC: 92 MG/DL (ref 74–99)
HCT VFR BLD CALC: 31.2 % (ref 34–48)
HEMOGLOBIN: 9.6 G/DL (ref 11.5–15.5)
IMMATURE GRANULOCYTES #: 0.03 E9/L
IMMATURE GRANULOCYTES %: 0.7 % (ref 0–5)
LYMPHOCYTES ABSOLUTE: 1.04 E9/L (ref 1.5–4)
LYMPHOCYTES RELATIVE PERCENT: 22.7 % (ref 20–42)
MAGNESIUM: 2.1 MG/DL (ref 1.6–2.6)
MCH RBC QN AUTO: 30.1 PG (ref 26–35)
MCHC RBC AUTO-ENTMCNC: 30.8 % (ref 32–34.5)
MCV RBC AUTO: 97.8 FL (ref 80–99.9)
METER GLUCOSE: 126 MG/DL (ref 74–99)
MONOCYTES ABSOLUTE: 0.45 E9/L (ref 0.1–0.95)
MONOCYTES RELATIVE PERCENT: 9.8 % (ref 2–12)
NEUTROPHILS ABSOLUTE: 2.75 E9/L (ref 1.8–7.3)
NEUTROPHILS RELATIVE PERCENT: 59.9 % (ref 43–80)
PDW BLD-RTO: 14.3 FL (ref 11.5–15)
PHOSPHORUS: 3.1 MG/DL (ref 2.5–4.5)
PLATELET # BLD: 146 E9/L (ref 130–450)
PMV BLD AUTO: 10.6 FL (ref 7–12)
POTASSIUM SERPL-SCNC: 4.3 MMOL/L (ref 3.5–5)
RBC # BLD: 3.19 E12/L (ref 3.5–5.5)
SODIUM BLD-SCNC: 143 MMOL/L (ref 132–146)
TOTAL PROTEIN: 5.7 G/DL (ref 6.4–8.3)
WBC # BLD: 4.6 E9/L (ref 4.5–11.5)

## 2020-12-20 PROCEDURE — 80053 COMPREHEN METABOLIC PANEL: CPT

## 2020-12-20 PROCEDURE — 84100 ASSAY OF PHOSPHORUS: CPT

## 2020-12-20 PROCEDURE — 82962 GLUCOSE BLOOD TEST: CPT

## 2020-12-20 PROCEDURE — 2580000003 HC RX 258: Performed by: SURGERY

## 2020-12-20 PROCEDURE — 36415 COLL VENOUS BLD VENIPUNCTURE: CPT

## 2020-12-20 PROCEDURE — 85025 COMPLETE CBC W/AUTO DIFF WBC: CPT

## 2020-12-20 PROCEDURE — 2140000000 HC CCU INTERMEDIATE R&B

## 2020-12-20 PROCEDURE — 6370000000 HC RX 637 (ALT 250 FOR IP): Performed by: INTERNAL MEDICINE

## 2020-12-20 PROCEDURE — 6370000000 HC RX 637 (ALT 250 FOR IP): Performed by: SURGERY

## 2020-12-20 PROCEDURE — 83735 ASSAY OF MAGNESIUM: CPT

## 2020-12-20 PROCEDURE — 6370000000 HC RX 637 (ALT 250 FOR IP): Performed by: NURSE PRACTITIONER

## 2020-12-20 RX ADMIN — DULOXETINE HYDROCHLORIDE 60 MG: 60 CAPSULE, DELAYED RELEASE ORAL at 09:32

## 2020-12-20 RX ADMIN — APIXABAN 5 MG: 5 TABLET, FILM COATED ORAL at 09:32

## 2020-12-20 RX ADMIN — SODIUM CHLORIDE, PRESERVATIVE FREE 10 ML: 5 INJECTION INTRAVENOUS at 09:32

## 2020-12-20 RX ADMIN — METOPROLOL TARTRATE 25 MG: 25 TABLET, FILM COATED ORAL at 09:32

## 2020-12-20 RX ADMIN — PANTOPRAZOLE SODIUM 40 MG: 40 TABLET, DELAYED RELEASE ORAL at 06:56

## 2020-12-20 RX ADMIN — APIXABAN 5 MG: 5 TABLET, FILM COATED ORAL at 21:48

## 2020-12-20 ASSESSMENT — PAIN SCALES - GENERAL
PAINLEVEL_OUTOF10: 0

## 2020-12-20 NOTE — PROGRESS NOTES
§ Neurologic: reflexes normal and symmetric, no cranial nerve deficit, gait, coordination and speech normal       Labs     CBC with Differential:    Lab Results   Component Value Date    WBC 5.8 12/19/2020    RBC 3.24 12/19/2020    HGB 10.1 12/19/2020    HCT 30.8 12/19/2020     12/19/2020    MCV 95.1 12/19/2020    MCH 31.2 12/19/2020    MCHC 32.8 12/19/2020    RDW 13.9 12/19/2020    SEGSPCT 51 03/15/2013    LYMPHOPCT 20.0 12/19/2020    MONOPCT 10.4 12/19/2020    BASOPCT 0.5 12/19/2020    MONOSABS 0.60 12/19/2020    LYMPHSABS 1.15 12/19/2020    EOSABS 0.32 12/19/2020    BASOSABS 0.03 12/19/2020     BMP:    Lab Results   Component Value Date     12/19/2020    K 3.9 12/19/2020    K 4.4 12/15/2020     12/19/2020    CO2 23 12/19/2020    BUN 17 12/19/2020    LABALBU 3.2 12/19/2020    LABALBU 4.2 10/13/2011    CREATININE 1.1 12/19/2020    CALCIUM 9.2 12/19/2020    GFRAA 58 12/19/2020    LABGLOM 48 12/19/2020    GLUCOSE 100 12/19/2020    GLUCOSE 107 10/13/2011     PT/INR:    Lab Results   Component Value Date    PROTIME 14.1 12/17/2020    INR 1.3 12/17/2020     PTT:    Lab Results   Component Value Date    APTT 143.0 12/18/2020   [APTT}    Imaging Studies:       Assessment and Plan       Pulmonary embolism, likely secondary to DVT of the right lower extremity, likely provoked  S/p EKOS, and on heparin  On Eliquis      DVT of the right lower extremity, likely provoked  Hx of DVT post Rt knee surgery  On Eliquis  Urine with blood color ,has kidney stone ,may need clearance from urology for anticoagulation ,     will decide about duration of anticoagulation later   Giving the size ,I would at least go for one year 10 mg bid X 7 days and then 5 mg bid until we see as KRISTA ALVARADO

## 2020-12-20 NOTE — CONSULTS
INPATIENT CONSULTATION RECORD FOR  12/20/2020      [facility]  [address] [place], [address] [facility]  [phone]        REASON FOR CONSULTATION:      Gross hematuria, solitary left kidney with 1.9 cm nonobstructing renal stone    HISTORY OF PRESENT ILLNESS:      The patient is a 68 y.o. female patient who presents with pulmonary emboli. She is on anticoagulation medication. She noticed gross hematuria. She had a recent CAT scan performed at [facility] imaging revealing a 1.9 cm left nonobstructing renal stone. She has an extensive urological history and was noted Dr. Musa Pace who saw her for nephrolithiasis. Upon his prison she began seeing Dr. Cassie Clarke. She was having recurrent stone episodes and had diminished function of her right kidney underwent nephrectomy on the right side at the [facility] clinic leaving her with a solitary kidney.       Past Medical History:   Diagnosis Date    Acute saddle pulmonary embolism with acute cor pulmonale ([address] 12/16/2020    Arthritis     Cataracts, bilateral     Cystitis     Depression     DVT of lower limb, acute (HCC) 03/15/2013    Glaucoma     s/p stent placement    Hernia     HIGH CHOLESTEROL     Hypertension     Kidney stones     Sinus infection     Superficial phlebitis of leg 10/30/2014         Past Surgical History:   Procedure Laterality Date    APPENDECTOMY      BLADDER STONE REMOVAL      COLONOSCOPY      DILATION AND CURETTAGE OF UTERUS      HERNIA REPAIR      TOTAL KNEE ARTHROPLASTY      right    TOTAL NEPHRECTOMY      right       Medications Prior to Admission:    Medications Prior to Admission: simvastatin (ZOCOR) 40 MG tablet, Take 1 tablet by mouth nightly  DULoxetine (CYMBALTA) 60 MG extended release capsule, Take 1 capsule by mouth daily  meloxicam (MOBIC) 15 MG tablet, Take 1 tablet by mouth daily  metoprolol tartrate (LOPRESSOR) 25 MG tablet, Take 1 tablet by mouth daily docusate sodium (COLACE) 100 MG capsule, Take 100 mg by mouth  timolol (TIMOPTIC) 0.5 % ophthalmic solution,   Handicap Placard MISC, by Does not apply route Duration 5 years  Psyllium (VEGETABLE LAXATIVE PO), Take by mouth    Allergies:    Penicillins    Social History:    reports that she has never smoked. She has never used smokeless tobacco. She reports that she does not drink alcohol or use drugs. Family History:   Non-contributory to this Urological problem  family history includes Other in her mother; Stroke in her mother. REVIEW OF SYSTEMS:  Respiratory: negative for cough and hemoptysis  Cardiovascular: negative for chest pain and dyspnea  Gastrointestinal: negative for abdominal pain, diarrhea, nausea and vomiting  Derm: negative for rash and skin lesion(s)  Neurological: negative for seizures and tremors  Endocrine: negative for diabetic symptoms including polydipsia and polyuria  : As above in the HPI, otherwise negative  All other systems negative    PHYSICAL EXAM:    Vitals:  BP (!) 143/75   Pulse 91   Temp 97.5 °F (36.4 °C) (Temporal)   Resp 18   Ht 5' 10\" (1.778 m)   Wt 208 lb 3.2 oz (94.4 kg)   SpO2 94%   BMI 29.87 kg/m²     General:  Awake, alert, oriented X 3. Well developed, well nourished, well groomed. No apparent distress. HEENT:  Normocephalic, atraumatic. Pupils equal, round. No scleral icterus. No conjunctival injection. Normal lips, teeth, and gums. No nasal discharge. Neck:  Supple, no masses. Heart:  RRR  Lungs:  No audible wheezing. Respirations symmetric and non-labored.   Abdomen:  soft, nontender, no masses, no organomegaly, no peritoneal signs  Extremities:  No clubbing, cyanosis, or edema  Skin:  Warm and dry, no open lesions or rashes  Neuro:  Cranial nerves 2-12 intact, no focal deficits  Rectal: deferred  Genitalia:  deferred    LABS:    Lab Results   Component Value Date    WBC 4.6 12/20/2020    HGB 9.6 (L) 12/20/2020    HCT 31.2 (L) 12/20/2020 MCV 97.8 12/20/2020     12/20/2020       Lab Results   Component Value Date    CREATININE 1.0 12/20/2020     Lab Results   Component Value Date    BC 24 Hours no growth 12/16/2020       Lab Results   Component Value Date    BLOODCULT2 24 Hours no growth 12/16/2020       ASSESSMENT / PLAN:      1. Gross hematuria in the setting of anticoagulation for large pulmonary emboli, left 1.9 cm nonobstructing renal stone, and solitary kidney. Mild hydronephrosis is likely reactive however we will check a MAG3 Lasix renal scan to ensure there is no evidence of obstruction. I did discuss with Stephanie Hale a plan and I recommend that upon discharge she follow-up with her primary urologist Dr. Tonja Damico regarding hematuria work-up and management of this large stone in a solitary kidney. My recommendation to her was for flexible ureteroscopy with laser lithotripsy. Certainly this would need to be done as an outpatient and hopefully off of anticoagulants. Will defer to Dr. Lor Beverly opinion.       Adelita Alvarenga M.D.  11:05 AM  12/20/2020

## 2020-12-20 NOTE — PROGRESS NOTES
Blade Stanton MD FACP   progress notes      CHIEF COMPLAINT:    Right leg pain and shortness of breath      HISTORY OF PRESENT ILLNESS:    29-year-old woman presented to hospital emergency room with acute onset of right leg pain associated with shortness of breath  Spoke with the ER physician at the time of admission  She was found to have DVT right leg and hypoxia related to multiple pulmonary emboli  She denied recent travel  Admits to having had a fall about a month ago  Since then she has been somewhat inactive  Found to have DVT  Admitted for further management  Data reviewed in detail  Fairly comfortable this morning  Post op day 4 ekos  Still has hematuria  Past Medical History:    Past Medical History:   Diagnosis Date    Acute saddle pulmonary embolism with acute cor pulmonale ([facility] [address] 12/16/2020    Arthritis     Cataracts, bilateral     Cystitis     Depression     DVT of lower limb, acute ([facility] [address] 03/15/2013    Glaucoma     s/p stent placement    Hernia     HIGH CHOLESTEROL     Hypertension     Kidney stones     Sinus infection     Superficial phlebitis of leg 10/30/2014       Past Surgical History:    Past Surgical History:   Procedure Laterality Date    APPENDECTOMY      BLADDER STONE REMOVAL      COLONOSCOPY      DILATION AND CURETTAGE OF UTERUS      [address] [place]      right    TOTAL NEPHRECTOMY      right       Medications Prior to Admission:    Medications Prior to Admission: simvastatin (ZOCOR) 40 MG tablet, Take 1 tablet by mouth nightly  DULoxetine (CYMBALTA) 60 MG extended release capsule, Take 1 capsule by mouth daily  meloxicam (MOBIC) 15 MG tablet, Take 1 tablet by mouth daily  metoprolol tartrate (LOPRESSOR) 25 MG tablet, Take 1 tablet by mouth daily  docusate sodium (COLACE) 100 MG capsule, Take 100 mg by mouth  timolol (TIMOPTIC) 0.5 % ophthalmic solution,   Handicap Placard MISC, by Does not apply route Duration 5 years PLAN:  Consult urology/patient had ultrasound at [place] 2 weeks ago/was supposed to see Dr. Isidra Albert  Consider IVC filter if patient needs surgical management of the left kidney stones  Ambulate  Easley catheter out  On oral anticoagulation with Eliquis  Vascular consult appreciated  Hold all nonsteroidals and aspirins  Reviewed in detail with the patient  Suspect she will be on anticoagulant therapy like Eliquis for the next 6 months      Arn Medicus  9:50 AM  12/20/2020

## 2020-12-21 LAB
ALBUMIN SERPL-MCNC: 3.5 G/DL (ref 3.5–5.2)
ALP BLD-CCNC: 33 U/L (ref 35–104)
ALT SERPL-CCNC: 23 U/L (ref 0–32)
ANION GAP SERPL CALCULATED.3IONS-SCNC: 15 MMOL/L (ref 7–16)
AST SERPL-CCNC: 28 U/L (ref 0–31)
BASOPHILS ABSOLUTE: 0.05 E9/L (ref 0–0.2)
BASOPHILS RELATIVE PERCENT: 0.8 % (ref 0–2)
BILIRUB SERPL-MCNC: 0.5 MG/DL (ref 0–1.2)
BILIRUBIN URINE: NEGATIVE
BLOOD CULTURE, ROUTINE: NORMAL
BLOOD, URINE: ABNORMAL
BUN BLDV-MCNC: 18 MG/DL (ref 8–23)
CALCIUM SERPL-MCNC: 10.3 MG/DL (ref 8.6–10.2)
CHLORIDE BLD-SCNC: 105 MMOL/L (ref 98–107)
CLARITY: ABNORMAL
CO2: 21 MMOL/L (ref 22–29)
COLOR: YELLOW
CREAT SERPL-MCNC: 1.1 MG/DL (ref 0.5–1)
CULTURE, BLOOD 2: NORMAL
EOSINOPHILS ABSOLUTE: 0.36 E9/L (ref 0.05–0.5)
EOSINOPHILS RELATIVE PERCENT: 5.9 % (ref 0–6)
GFR AFRICAN AMERICAN: 58
GFR NON-AFRICAN AMERICAN: 48 ML/MIN/1.73
GLUCOSE BLD-MCNC: 100 MG/DL (ref 74–99)
GLUCOSE URINE: NEGATIVE MG/DL
HCT VFR BLD CALC: 34.8 % (ref 34–48)
HEMOGLOBIN: 10.8 G/DL (ref 11.5–15.5)
IMMATURE GRANULOCYTES #: 0.03 E9/L
IMMATURE GRANULOCYTES %: 0.5 % (ref 0–5)
KETONES, URINE: NEGATIVE MG/DL
LEUKOCYTE ESTERASE, URINE: ABNORMAL
LYMPHOCYTES ABSOLUTE: 1.12 E9/L (ref 1.5–4)
LYMPHOCYTES RELATIVE PERCENT: 18.4 % (ref 20–42)
MAGNESIUM: 2.2 MG/DL (ref 1.6–2.6)
MCH RBC QN AUTO: 30.1 PG (ref 26–35)
MCHC RBC AUTO-ENTMCNC: 31 % (ref 32–34.5)
MCV RBC AUTO: 96.9 FL (ref 80–99.9)
MONOCYTES ABSOLUTE: 0.55 E9/L (ref 0.1–0.95)
MONOCYTES RELATIVE PERCENT: 9 % (ref 2–12)
NEUTROPHILS ABSOLUTE: 3.97 E9/L (ref 1.8–7.3)
NEUTROPHILS RELATIVE PERCENT: 65.4 % (ref 43–80)
NITRITE, URINE: NEGATIVE
PDW BLD-RTO: 14.1 FL (ref 11.5–15)
PH UA: 5.5 (ref 5–9)
PHOSPHORUS: 3.6 MG/DL (ref 2.5–4.5)
PLATELET # BLD: 226 E9/L (ref 130–450)
PMV BLD AUTO: 10.4 FL (ref 7–12)
POTASSIUM SERPL-SCNC: 4.3 MMOL/L (ref 3.5–5)
PROTEIN UA: 30 MG/DL
RBC # BLD: 3.59 E12/L (ref 3.5–5.5)
SODIUM BLD-SCNC: 141 MMOL/L (ref 132–146)
SPECIFIC GRAVITY UA: 1.02 (ref 1–1.03)
TOTAL PROTEIN: 6.5 G/DL (ref 6.4–8.3)
UROBILINOGEN, URINE: 0.2 E.U./DL
WBC # BLD: 6.1 E9/L (ref 4.5–11.5)

## 2020-12-21 PROCEDURE — 85025 COMPLETE CBC W/AUTO DIFF WBC: CPT

## 2020-12-21 PROCEDURE — 97165 OT EVAL LOW COMPLEX 30 MIN: CPT

## 2020-12-21 PROCEDURE — 6370000000 HC RX 637 (ALT 250 FOR IP): Performed by: SURGERY

## 2020-12-21 PROCEDURE — 97530 THERAPEUTIC ACTIVITIES: CPT | Performed by: PHYSICAL THERAPIST

## 2020-12-21 PROCEDURE — 81001 URINALYSIS AUTO W/SCOPE: CPT

## 2020-12-21 PROCEDURE — 2140000000 HC CCU INTERMEDIATE R&B

## 2020-12-21 PROCEDURE — 84100 ASSAY OF PHOSPHORUS: CPT

## 2020-12-21 PROCEDURE — 36415 COLL VENOUS BLD VENIPUNCTURE: CPT

## 2020-12-21 PROCEDURE — 83735 ASSAY OF MAGNESIUM: CPT

## 2020-12-21 PROCEDURE — 97535 SELF CARE MNGMENT TRAINING: CPT

## 2020-12-21 PROCEDURE — 6370000000 HC RX 637 (ALT 250 FOR IP): Performed by: NURSE PRACTITIONER

## 2020-12-21 PROCEDURE — 2580000003 HC RX 258: Performed by: SURGERY

## 2020-12-21 PROCEDURE — 80053 COMPREHEN METABOLIC PANEL: CPT

## 2020-12-21 PROCEDURE — 6370000000 HC RX 637 (ALT 250 FOR IP): Performed by: INTERNAL MEDICINE

## 2020-12-21 RX ADMIN — SODIUM CHLORIDE, PRESERVATIVE FREE 10 ML: 5 INJECTION INTRAVENOUS at 08:17

## 2020-12-21 RX ADMIN — DULOXETINE HYDROCHLORIDE 60 MG: 60 CAPSULE, DELAYED RELEASE ORAL at 08:16

## 2020-12-21 RX ADMIN — ACETAMINOPHEN 650 MG: 325 TABLET ORAL at 13:27

## 2020-12-21 RX ADMIN — APIXABAN 5 MG: 5 TABLET, FILM COATED ORAL at 08:17

## 2020-12-21 RX ADMIN — PANTOPRAZOLE SODIUM 40 MG: 40 TABLET, DELAYED RELEASE ORAL at 06:54

## 2020-12-21 RX ADMIN — APIXABAN 5 MG: 5 TABLET, FILM COATED ORAL at 21:30

## 2020-12-21 RX ADMIN — SODIUM CHLORIDE, PRESERVATIVE FREE 10 ML: 5 INJECTION INTRAVENOUS at 21:30

## 2020-12-21 RX ADMIN — METOPROLOL TARTRATE 25 MG: 25 TABLET, FILM COATED ORAL at 08:17

## 2020-12-21 ASSESSMENT — PAIN SCALES - GENERAL
PAINLEVEL_OUTOF10: 0
PAINLEVEL_OUTOF10: 6
PAINLEVEL_OUTOF10: 0

## 2020-12-21 NOTE — PROGRESS NOTES
MCV 95.1 97.8 96.9    146 226       BMP:  Recent Labs     12/19/20  0558 12/20/20  0535 12/21/20  1047    143 141   K 3.9 4.3 4.3    107 105   CO2 23 24 21*   PHOS 2.3* 3.1 3.6   BUN 17 16 18   CREATININE 1.1* 1.0 1.1*    ALB:3,BILIDIR:3,BILITOT:3,ALKPHOS:3)@    PT/INR: No results for input(s): PROTIME, INR in the last 72 hours. ABG:   No results for input(s): PH, PO2, PCO2, HCO3, BE, O2SAT, METHB, O2HB, COHB, O2CON, HHB, THB in the last 72 hours. Radiology/Other tests reviewed: none    Assessment:     Principal Problem:    Acute saddle pulmonary embolism with acute cor pulmonale (HCC)  Active Problems:    PE (pulmonary thromboembolism) ([facility] [address]  Resolved Problems:    * No resolved hospital problems. *      Plan:       1. Cont with anticoagulation for now, observe hematuria, may need IVC filter if have to stop  2. Renal stones as per Urology, may need lithotripsy, can consider bridging with lovenox to get procedure done or if can't, will need IVC filter  3. On RA and no lung medications, observe respiratory function  4. OOB to chair, ambulate with assistance  5. Will need ffup with Dr Napoleon Acrher as out-pt to get timing of lithotripsy with anticoagulation worked out. Time at the bedside, reviewing labs and radiographs, reviewing notes and consultations, discussing with staff and family was more than 35 minutes. Thanks for letting us see this patient in consultation. Please contact us with any questions. Office [phone] or after hours through [employer] x [phone].

## 2020-12-21 NOTE — PROGRESS NOTES
12/21/2020 5:37 PM  Service: Urology  Group: [facility] (Andrew/Maria Luisa/Jake)    Kayleigh Moreno  [id number]    Subjective:    Urine now yellow  She denies any difficulty urinating  She denies any flank pain  No fever or chills  Voiding comfortably    Review of Systems  Constitutional: No fever or chills   Respiratory: negative for cough and hemoptysis  Cardiovascular: negative for chest pain and dyspnea  Gastrointestinal: negative for abdominal pain, diarrhea, nausea and vomiting   : See above  Derm: negative for rash and skin lesion(s)  Neurological: negative for seizures and tremors  Musculoskeletal: Negative    Psychiatric: Negative   All other reviews are negative      Scheduled Meds:   bisacodyl  10 mg Rectal Once    apixaban  5 mg Oral BID    pantoprazole  40 mg Oral QAM AC    metoprolol tartrate  25 mg Oral Daily    DULoxetine  60 mg Oral Daily    sodium chloride flush  10 mL Intravenous 2 times per day       Objective:  Vitals:    12/21/20 1600   BP: 118/63   Pulse: 75   Resp: 16   Temp: 97.6 °F (36.4 °C)   SpO2: 95%         Allergies: Penicillins    General Appearance: alert and oriented to person, place and time and in no acute distress  Skin: no rash or erythema  Head: normocephalic and atraumatic  Pulmonary/Chest: normal air movement, no respiratory distress  Abdomen: soft, non-tender, non-distended  Genitourinary: No Easley  Extremities: no cyanosis, clubbing or edema         Labs:     Recent Labs     12/21/20  1047      K 4.3      CO2 21*   BUN 18   CREATININE 1.1*   GLUCOSE 100*   CALCIUM 10.3*       Lab Results   Component Value Date    HGB 10.8 12/21/2020    HCT 34.8 12/21/2020         Assessment/Plan:  Gross hematuria in the setting of anticoagulation for large PE  Solitary left kidney  1.9 cm nonobstructing left renal stone    Line up urine, urine is now yellow.  Ok to stop saving urine at this time   Urine cytology pending MAG 3 renal lasix scan is ordered and pending to rule out obstruction   Will need to follow up with Dr. Ricarda Root upon discharge to further work up gross hematuria and discuss possible management of this left renal stone in her solitary right kidney.    We will cont to follow     MANJIT Ramsey - CNP   [facility]  Urology

## 2020-12-21 NOTE — PROGRESS NOTES
Occupational Therapy  OCCUPATIONAL THERAPY INITIAL EVALUATION        Date:2020  Patient Name: Fanny Schlatter  MRN: [id number]  : 1940  Room: [address] [place] [address]    Referring Physician:  George Marcelino MD    Evaluating OT:  Solitario Fried MOT, OTR/L #[id number]    AM-PAC Daily Activity Raw Score:  1924  Recommended Adaptive Equipment:  TBD as pt progresses     Reason for Admission:  Pt was admitted w/ Dizziness, SOB - (+) Chris PE, DVT R LE    Diagnosis:     1. Other acute pulmonary embolism, unspecified whether acute cor pulmonale present ([facility] [address]    2. Acute deep vein thrombosis (DVT) of right lower extremity, unspecified vein (HCC)      Procedures this admission:  20:  Selective Bilatearal pulmonary artery catheter placement in branch , angiogram;  Placement of 2 EKOS Lysis catheter and US wire (12 cm infusion length) in bilateral pulmonary artery  20:  Removal of Catheter      Pertinent Medical History:  Arthritis, Depression, Glaucoma, Right Total Nephrectomy, R TKA      Precautions:  Falls  General Diet    Home Living: Pt lives alone in a [facility] house. Bed/bath/laundry on the main floor. No Basement. Level entry  Bathroom setup:  Tub-Shower, Standard Commode   Equipment owned:  [facility] - over toilet, [address], [place]    Available Family Assist:  Sister lives locally and can assist PRN - works Full Time    Prior Level of Function:  IND with ADLs, IADLs, Transfers and Mobility using No AD for ambulation.    Driving:  Yes - shopping, outings, errands - limited since pandemic   Occupation:  None reported - active member of her [employer]    Pain Level:  Denies;  Nsg Notified   Additional Complaints:  None    Vitals/Lab Values:  WNL, Room Air     Cognition: A & O x 4   Able to Follow Multi-step Commands INDly   Memory:  good    Sequencing:  good    Problem solving:  good    Judgement/safety:  good   Additional Comments:  Pt was pleasant, cooperative       Functional Assessment:   Initial Eval Status  Strength:  WFL Chris UEs    Fine Motor Coordination:  WFL Chris UEs    Sensation:  Denies numbness or tingling Chris UEs  Tone:  WFL Chris UEs  Edema:  None Noted                            Comments: Upon arrival, patient was found seated in b/s chair. She was agreeable to participate in therapeutic ax. No Family present during session. Received permission from RN prior to engaging pt in OT services. At the end of the session, patient was properly positioned in b/s chair. Call light and phone within reach, all lines and tubes intact. Oriented pt to call bell. Made all appropriate Environmental Modifications to facilitate pt's level of IND and safety. All needs met. Overall patient demonstrated decreased independence and safety during completion of ADL/functional transfer/mobility tasks. Pt would benefit from continued skilled OT to increase safety and independence with completion of ADL/IADL tasks for functional independence and quality of life. Treatment:      Provided Skilled SUP/Assist w/ Pt safety, Proper Positioning, ADLs, Functional Transfers and Functional Mobility as noted above, as well as set up and clean up for session. Skilled monitoring of Vitals and pts response to treatment. Consulted RN, KIM    Education:      Provided Pt/Family ed re: Purpose of OT services;  OT Plan of Care;    ? ADL-  Instruction/training on use of DME/AD/Adaptive equip/techs to improve safety/IND with Functional Ax   ? Mobility-  Instruction/training on safety and improved independence with bed mobility, functional transfers, functional mobility, walker safety   ? Activity tolerance - Instruction/training on energy conservation/work simplification, techs to increase endurance for completion of Functional Ax   ? Cognitive retraining -  Cues for safety during Functional Ax for safety, sequencing, problem solving, improved safety awareness  ?  Skilled monitoring of pt's response to tx ax ? Techs for improved Safety/Safety Awareness w/ Functional Activity/Mobility  ? Recommendations for Continued Participation in OT services during Hospitalization  ? Made all appropriate Environmental Modifications to facilitate pt's level of IND and safety. Pt and/or Family verbalized/demonstrated a Good understanding of education provided. Will Review PRN. Assessment of current deficits   Functional mobility [x]  ADLs [x] Strength [x]  Cognition []  Functional transfers  [x] IADLs [x] Safety Awareness [x]  Endurance [x]  Fine Motor Coordination [] Balance [x] Vision/perception [] Sensation []   Gross Motor Coordination [] ROM [] Delirium []                  Motor Control []      Plan of Care: OT 1-3 x/week for 1-2 weeks PRN   [x] ADL retraining/AE, Equipment Needs/Recommendations   [x] Energy Conservation Techniques/Strategies      [] Neuromuscular Re-Education      [x] Functional Transfer Training         [x] Functional Mobility Training          [] Cognitive Re-Training          [] Splinting/Positioning Needs           [x] Therapeutic Activity   [x]Therapeutic Exercise   [] Visual/Perceptual   [] Delirium Prevention/Treatment   [x] Positioning to Improve Functional Gage, Safety, and Skin Integrity   [x] Patient and/or Family Education to Increase Safety and Functional Gage   [x] Environmental Modifications  [x] Compensatory techniques for ADLs   [x] Other:       Pt would benefit from continued skilled OT services to increase safety and independence with completion of ADL/IADL tasks for functional independence and quality of life. Pt/Family actively participated in the establishment of goals. Rehab Potential:  Good for established goals    Patient / Family Goal:  Return home with family assist ASAP     Patient and/or Family were instructed on Functional Diagnosis, Prognosis/Goals and OT Plan of Care. Demonstrated Good understanding. Evaluation Time includes thorough review of current medical information, gathering information on past medical history/social history and prior level of function, completion of standardized testing/informal observation of tasks, assessment of data and education on plan of care and goals.      Eval Complexity: Low  Profile and History - Mod  Assessment of Occupational Performance and Identification of Deficits - Low  Clinical Decision Making - Low     Time In:  4487              Time Out:  7354  Total Treatment Time:  27 mins      Treatment Charges: Mins Units   OT Eval Low 97165 X 1   OT Eval Medium 16153     OT Eval High 06402     OT Re-Eval [id number]     Therapeutic Ex  35611     Therapeutic Activities 66947     ADL/Self Care 46691 27 2   Neuro Re-ed 56776     Orthotic manage/training  48725     Non-Billable Time     Total Timed Treatment 27 [address] [place], [place], OTR/L  # [id number]

## 2020-12-21 NOTE — PROGRESS NOTES
Merline Hawking MD FACP   progress notes      CHIEF COMPLAINT:    Right leg pain and shortness of breath      HISTORY OF PRESENT ILLNESS:    74-year-old woman presented to hospital emergency room with acute onset of right leg pain associated with shortness of breath  Spoke with the ER physician at the time of admission  She was found to have DVT right leg and hypoxia related to multiple pulmonary emboli  She denied recent travel  Admits to having had a fall about a month ago  Since then she has been somewhat inactive  Found to have DVT  Admitted for further management  Data reviewed in detail  Fairly comfortable this morning  Post op day 4 ekos  Still has hematuria  Past Medical History:    Past Medical History:   Diagnosis Date    Acute saddle pulmonary embolism with acute cor pulmonale ([facility] [address] 12/16/2020    Arthritis     Cataracts, bilateral     Cystitis     Depression     DVT of lower limb, acute ([facility] [address] 03/15/2013    Glaucoma     s/p stent placement    Hernia     HIGH CHOLESTEROL     Hypertension     Kidney stones     Sinus infection     Superficial phlebitis of leg 10/30/2014       Past Surgical History:    Past Surgical History:   Procedure Laterality Date    APPENDECTOMY      BLADDER STONE REMOVAL      COLONOSCOPY      DILATION AND CURETTAGE OF UTERUS      [address] [place]      right    TOTAL NEPHRECTOMY      right       Medications Prior to Admission:    Medications Prior to Admission: simvastatin (ZOCOR) 40 MG tablet, Take 1 tablet by mouth nightly  DULoxetine (CYMBALTA) 60 MG extended release capsule, Take 1 capsule by mouth daily  meloxicam (MOBIC) 15 MG tablet, Take 1 tablet by mouth daily  metoprolol tartrate (LOPRESSOR) 25 MG tablet, Take 1 tablet by mouth daily  docusate sodium (COLACE) 100 MG capsule, Take 100 mg by mouth  timolol (TIMOPTIC) 0.5 % ophthalmic solution,   Handicap Placard MISC, by Does not apply route Duration 5 years Reviewed in detail with the patient      PLAN:  Urology input appreciated  Nuclear scan to rule out hydronephrosis or perfusion problems on the left kidney  Consider IVC filter if patient needs surgical management of the left kidney stones  Ambulate  Easley catheter out  On oral anticoagulation with Eliquis  Vascular consult appreciated  Hold all nonsteroidals and aspirins  Reviewed in detail with the patient  Suspect she will be on anticoagulant therapy like Eliquis for the next 6 months      Andre Molina  7:34 AM  12/21/2020

## 2020-12-21 NOTE — HOME CARE
[facility] received referral. Will follow after discharge. Spoke with patient and verified demographics. Joaquin Anderson LPN, [facility].

## 2020-12-21 NOTE — PROGRESS NOTES
Physical Therapy  [address]  Daily Treatment Note  NAME: Eduardo Gutierrez  : 1940  MRN: [id number]    Date of Service: 2020    Referring Provider:  John Alejandro MD    Evaluating PT:  Gary Hewitt, PT, DPT [id number]      Room #:  9280/4774-X  Diagnosis:  PE s/p EKOS  PMHx/PSHx:  Acute saddle PE with acute cor pulmonale, arthritis, cystitis, depression, DVT, glaucoma, hernia, HTN, kidney stones, sinus infection, superficial phlebitis of leg   Procedure/Surgery:  Placement of 2 EKOS Lysis catheter and US wire (12 cm infusion length) in bilateral pulmonary artery    Precautions:  Falls   Equipment Needs:  NA     SUBJECTIVE:     Pt lives alone in a 2 story home with no stairs to enter. Bedroom and bathroom are on the 1st level. Pt ambulated with no AD PTA. Owns [employer]. Independent at baseline. Her sister lives in the same complex and can assist if needed.     OBJECTIVE:    Initial Evaluation  Date: 20 Treatment  20 Short Term/ Long Term   Goals   AM-PAC 6 Clicks   63/91     Was pt agreeable to Eval/treatment? Yes  yes     Does pt have pain? No c/o pain at rest  no c/o pain throughout session     Bed Mobility  Rolling: NT  Supine to sit: Min A  Sit to supine: NT  Scooting: Min A to EOB  NT, pt seated on BSC upon arrival and remained OOB to [facility]: Independent   Supine to sit: Independent   Sit to supine: Independent   Scooting: Independent    Transfers Sit to stand: Min A  Stand to sit: Min A  Stand pivot: SBA with [employer]  Sit to stand: CGA  Stand to sit: CGA  Stand pivot: SBA with FWW  Sit to stand: Independent   Stand to sit:  Independent   Stand pivot: Modified Independent with AAD   Ambulation    80 feet with [employer] Min A progressing to SBA  75 feet x2 with SBA with FWW >250 feet with AAD Modified Independent     Stair negotiation: ascended and descended  NT NT  >2 steps with 1 rail Modified Independent     ROM BUE:  WFL  BLE:  WFL     Strength BUE:  NT  BLE:  Grossly 4/5       Balance Sitting EOB:  SBA static; Min A dynamic   Dynamic Standing:  Min A with [employer]  Sitting EOB:  SBA   Dynamic Standing: SBA with FWW Sitting EOB:  Independent   Dynamic Standing:  Modified Independent        Pt is A & O x 4  Sensation:  Pt denies numbness and tingling to extremities  Edema:  Unremarkable     Vitals:  Heart Rate post session 113bpm   SPO2 post session 96% on RA     Patient education  Pt educated on role of therapy, safety with mobility, improved activity pacing in the home, safety with use of walker and improved posture throughout gait    Patient response to education:   Pt verbalized understanding Pt demonstrated skill Pt requires further education in this area   yes yes yes     ASSESSMENT:    Comments:  Pt seated on BSC upon arrival, agreeable to PT session. Pt requesting briefs prior to ambulation due to incontinence with activity since admission. Pt required cues for safe technique and hands on assistance for short distance amb with no AD as walker unable to fit in confined space between bed and BSC. Pt completed all additional ambulation with FWW at SBA level, cues for upright posture and walker approximation. Pt fatigues quickly and taking brief standing rest breaks of 2-3 seconds after approximately 5' during initial ambulation distance. Pt encouraged to attempt second ambulation distance with decreased breaks and was able to amb full 75' distance without standing rest breaks. Pt seated in chair upon completion of session with all needs in reach and activity pacing and home modifications to accommodate for fatigue reviewed with pt.     Treatment:  Patient practiced and was instructed in the following treatment:    · Transfer training: hands on assist for BSC>bed transfer without use of AD, cues for hand placement with stand>sit · Gait training: cues for upright posture and walker approximation during ambulation, encouraged for decreased breaks during ambulation  · Therapeutic activities: education for activity pacing and modifications of household tasks     PLAN:    Patient is making good progress towards established goals. Will continue with current POC.     Time in  0824  Time out  0852    Total Treatment Time  28    CPT codes:  [] Gait training 63586 0 minutes  [] Manual therapy 87373 0 minutes  [x] Therapeutic activities 70693 28 minutes  [] Therapeutic exercises 39986 0 minutes  [] Neuromuscular reeducation 26285 0 minutes      Alex Jarvis PT, DPT  [id number]

## 2020-12-22 ENCOUNTER — APPOINTMENT (OUTPATIENT)
Dept: NUCLEAR MEDICINE | Age: 80
DRG: 166 | End: 2020-12-22
Payer: MEDICARE

## 2020-12-22 LAB
ALBUMIN SERPL-MCNC: 3.5 G/DL (ref 3.5–5.2)
ALP BLD-CCNC: 31 U/L (ref 35–104)
ALT SERPL-CCNC: 19 U/L (ref 0–32)
ANION GAP SERPL CALCULATED.3IONS-SCNC: 10 MMOL/L (ref 7–16)
AST SERPL-CCNC: 21 U/L (ref 0–31)
BACTERIA: ABNORMAL /HPF
BASOPHILS ABSOLUTE: 0.03 E9/L (ref 0–0.2)
BASOPHILS RELATIVE PERCENT: 0.5 % (ref 0–2)
BILIRUB SERPL-MCNC: 0.5 MG/DL (ref 0–1.2)
BUN BLDV-MCNC: 20 MG/DL (ref 8–23)
CALCIUM SERPL-MCNC: 10 MG/DL (ref 8.6–10.2)
CHLORIDE BLD-SCNC: 104 MMOL/L (ref 98–107)
CO2: 24 MMOL/L (ref 22–29)
CREAT SERPL-MCNC: 1.1 MG/DL (ref 0.5–1)
EOSINOPHILS ABSOLUTE: 0.39 E9/L (ref 0.05–0.5)
EOSINOPHILS RELATIVE PERCENT: 6.5 % (ref 0–6)
EPITHELIAL CELLS, UA: ABNORMAL /HPF
GFR AFRICAN AMERICAN: 58
GFR NON-AFRICAN AMERICAN: 48 ML/MIN/1.73
GLUCOSE BLD-MCNC: 97 MG/DL (ref 74–99)
HCT VFR BLD CALC: 32.4 % (ref 34–48)
HEMOGLOBIN: 10.3 G/DL (ref 11.5–15.5)
IMMATURE GRANULOCYTES #: 0.04 E9/L
IMMATURE GRANULOCYTES %: 0.7 % (ref 0–5)
LYMPHOCYTES ABSOLUTE: 1.25 E9/L (ref 1.5–4)
LYMPHOCYTES RELATIVE PERCENT: 20.8 % (ref 20–42)
MAGNESIUM: 2 MG/DL (ref 1.6–2.6)
MCH RBC QN AUTO: 30.1 PG (ref 26–35)
MCHC RBC AUTO-ENTMCNC: 31.8 % (ref 32–34.5)
MCV RBC AUTO: 94.7 FL (ref 80–99.9)
MONOCYTES ABSOLUTE: 0.51 E9/L (ref 0.1–0.95)
MONOCYTES RELATIVE PERCENT: 8.5 % (ref 2–12)
NEUTROPHILS ABSOLUTE: 3.78 E9/L (ref 1.8–7.3)
NEUTROPHILS RELATIVE PERCENT: 63 % (ref 43–80)
PDW BLD-RTO: 14.1 FL (ref 11.5–15)
PHOSPHORUS: 3.9 MG/DL (ref 2.5–4.5)
PLATELET # BLD: 201 E9/L (ref 130–450)
PMV BLD AUTO: 10.2 FL (ref 7–12)
POTASSIUM SERPL-SCNC: 4.2 MMOL/L (ref 3.5–5)
RBC # BLD: 3.42 E12/L (ref 3.5–5.5)
RBC UA: >20 /HPF (ref 0–2)
SODIUM BLD-SCNC: 138 MMOL/L (ref 132–146)
TOTAL PROTEIN: 6.1 G/DL (ref 6.4–8.3)
WBC # BLD: 6 E9/L (ref 4.5–11.5)
WBC UA: ABNORMAL /HPF (ref 0–5)
YEAST: ABNORMAL /HPF

## 2020-12-22 PROCEDURE — 85025 COMPLETE CBC W/AUTO DIFF WBC: CPT

## 2020-12-22 PROCEDURE — 36415 COLL VENOUS BLD VENIPUNCTURE: CPT

## 2020-12-22 PROCEDURE — 83735 ASSAY OF MAGNESIUM: CPT

## 2020-12-22 PROCEDURE — 2140000000 HC CCU INTERMEDIATE R&B

## 2020-12-22 PROCEDURE — 6370000000 HC RX 637 (ALT 250 FOR IP): Performed by: NURSE PRACTITIONER

## 2020-12-22 PROCEDURE — 80053 COMPREHEN METABOLIC PANEL: CPT

## 2020-12-22 PROCEDURE — 6370000000 HC RX 637 (ALT 250 FOR IP): Performed by: SURGERY

## 2020-12-22 PROCEDURE — 84100 ASSAY OF PHOSPHORUS: CPT

## 2020-12-22 PROCEDURE — 2580000003 HC RX 258: Performed by: SURGERY

## 2020-12-22 PROCEDURE — 6370000000 HC RX 637 (ALT 250 FOR IP): Performed by: INTERNAL MEDICINE

## 2020-12-22 RX ADMIN — METOPROLOL TARTRATE 25 MG: 25 TABLET, FILM COATED ORAL at 07:59

## 2020-12-22 RX ADMIN — SODIUM CHLORIDE, PRESERVATIVE FREE 10 ML: 5 INJECTION INTRAVENOUS at 21:03

## 2020-12-22 RX ADMIN — APIXABAN 5 MG: 5 TABLET, FILM COATED ORAL at 07:59

## 2020-12-22 RX ADMIN — DULOXETINE HYDROCHLORIDE 60 MG: 60 CAPSULE, DELAYED RELEASE ORAL at 07:59

## 2020-12-22 RX ADMIN — SODIUM CHLORIDE, PRESERVATIVE FREE 10 ML: 5 INJECTION INTRAVENOUS at 07:59

## 2020-12-22 RX ADMIN — APIXABAN 5 MG: 5 TABLET, FILM COATED ORAL at 21:03

## 2020-12-22 RX ADMIN — PANTOPRAZOLE SODIUM 40 MG: 40 TABLET, DELAYED RELEASE ORAL at 06:15

## 2020-12-22 ASSESSMENT — PAIN SCALES - GENERAL
PAINLEVEL_OUTOF10: 0
PAINLEVEL_OUTOF10: 0

## 2020-12-22 NOTE — PROGRESS NOTES
Comprehensive Nutrition Assessment    Type and Reason for Visit:  Initial(LOS)    Nutrition Recommendations/Plan: No nutritional supplementation recommended at this time. Nutrition Assessment:  Patients po intake has been a little sporadic, but still averaging ~75% of most meals served ; adm w/ PE ; no ONS recommended at this time    Malnutrition Assessment:  Malnutrition Status:  No malnutrition    Context:  Acute Illness     Findings of the 6 clinical characteristics of malnutrition:  Energy Intake:  No significant decrease in energy intake  Weight Loss:  No significant weight loss     Body Fat Loss:  No significant body fat loss     Muscle Mass Loss:  No significant muscle mass loss    Fluid Accumulation:  No significant fluid accumulation     Strength:  Not Performed    Estimated Daily Nutrient Needs:  Energy (kcal):  0255-9427 (REE 1497 x 1.1 SF); Weight Used for Energy Requirements:  Current     Protein (g):   (1.3-1.5g/kg IBW); Weight Used for Protein Requirements:  Ideal        Fluid (ml/day):  5044-8434; Method Used for Fluid Requirements:  1 ml/kcal      Nutrition Related Findings:  -I&Os (-1.5 L), no edema, active BS, missing teeth, A&O x 4, puncture site      Wounds:  None       Current Nutrition Therapies:    DIET GENERAL; Anthropometric Measures:  · Height: 5' 10\" (177.8 cm)  · Current Body Weight: 206 lb (93.4 kg)(12/22, standing scale)   · Admission Body Weight: 203 lb (92.1 kg)(12/15, actual)    · Usual Body Weight: 209 lb (94.8 kg)(3/12/20, no method)     · Ideal Body Weight: 150 lbs; % Ideal Body Weight 137.3 %   · BMI: 29.6   · BMI Categories: Overweight (BMI 25.0-29. 9)       Nutrition Diagnosis:   No nutrition diagnosis at this time     Nutrition Interventions:   Food and/or Nutrient Delivery:  Continue Current Diet  Nutrition Education/Counseling:  Education not indicated   Coordination of Nutrition Care:  Continue to monitor while inpatient    Goals: Patient will consume >75% of meals served       Nutrition Monitoring and Evaluation:   Behavioral-Environmental Outcomes:  Beliefs and Attitutes   Food/Nutrient Intake Outcomes:  Food and Nutrient Intake  Physical Signs/Symptoms Outcomes:  Biochemical Data, Chewing or Swallowing, GI Status, Fluid Status or Edema, Hemodynamic Status, Meal Time Behavior, Nutrition Focused Physical Findings, Skin, Weight     Discharge Planning:     Too soon to determine     Electronically signed by Queta Casiano RD, LD on 12/22/20 at 11:53 AM EST    Contact: [phone]

## 2020-12-22 NOTE — PROGRESS NOTES
12/22/2020 12:28 PM  Service: Urology  Group: [facility] (Andrew/Maria Luisa/Jake)    Ethel Power  [id number]    Subjective:    Denies difficulty urinating  Eating lunch  Denies flank pain   No fever or chills  Unable to get MAG3 today due to IV infiltrating  Nuclear has to order more isotope and won't be here till AM     Review of Systems  Constitutional: No fever or chills   Respiratory: negative for cough and hemoptysis  Cardiovascular: negative for chest pain and dyspnea  Gastrointestinal: negative for abdominal pain, diarrhea, nausea and vomiting   : See above  Derm: negative for rash and skin lesion(s)  Neurological: negative for seizures and tremors  Musculoskeletal: Negative    Psychiatric: Negative   All other reviews are negative      Scheduled Meds:   bisacodyl  10 mg Rectal Once    apixaban  5 mg Oral BID    pantoprazole  40 mg Oral QAM AC    metoprolol tartrate  25 mg Oral Daily    DULoxetine  60 mg Oral Daily    sodium chloride flush  10 mL Intravenous 2 times per day       Objective:  Vitals:    12/22/20 1102   BP: 136/81   Pulse: 83   Resp: 18   Temp: 97.8 °F (36.6 °C)   SpO2:          Allergies: Penicillins    General Appearance: alert and oriented to person, place and time and in no acute distress  Skin: no rash or erythema  Head: normocephalic and atraumatic  Pulmonary/Chest: normal air movement, no respiratory distress  Abdomen: soft, non-tender, non-distended  Genitourinary: No Easley  Extremities: no cyanosis, clubbing or edema         Labs:     Recent Labs     12/22/20  0538      K 4.2      CO2 24   BUN 20   CREATININE 1.1*   GLUCOSE 97   CALCIUM 10.0       Lab Results   Component Value Date    HGB 10.3 12/22/2020    HCT 32.4 12/22/2020         Assessment/Plan:  Gross hematuria in the setting of anticoagulation for large PE  Solitary left kidney  1.9 cm nonobstructing left renal stone      -Urine cytology pending  -Creatinine stable -MAG 3 renal lasix scan unable to be performed today due to IV infiltration, planning on doing in [place]   -I explained to Jonn Wu that her creatinine is stable, no flank pain, no leukocytosis, or fevers and that this can be performed as an outpatient as long as she is sure to have close follow up with Dr. Tram Cisneros upon discharge to further work up gross hematuria and discuss possible management of this left renal stone in her solitary left kidney in the future.   -[id number] Omayra Prieto for discharge when ok with primary     MANJIT Porter - CNP   [facility]  Urology

## 2020-12-22 NOTE — PROGRESS NOTES
Attempted to see the patient on the floor this morning  Patient is away for a procedure   data reviewed

## 2020-12-23 ENCOUNTER — APPOINTMENT (OUTPATIENT)
Dept: NUCLEAR MEDICINE | Age: 80
DRG: 166 | End: 2020-12-23
Payer: MEDICARE

## 2020-12-23 VITALS
BODY MASS INDEX: 29.03 KG/M2 | SYSTOLIC BLOOD PRESSURE: 130 MMHG | HEART RATE: 83 BPM | OXYGEN SATURATION: 95 % | HEIGHT: 70 IN | TEMPERATURE: 98.3 F | WEIGHT: 202.8 LBS | RESPIRATION RATE: 18 BRPM | DIASTOLIC BLOOD PRESSURE: 81 MMHG

## 2020-12-23 LAB
ALBUMIN SERPL-MCNC: 3.7 G/DL (ref 3.5–5.2)
ALP BLD-CCNC: 32 U/L (ref 35–104)
ALT SERPL-CCNC: 15 U/L (ref 0–32)
ANION GAP SERPL CALCULATED.3IONS-SCNC: 10 MMOL/L (ref 7–16)
AST SERPL-CCNC: 17 U/L (ref 0–31)
BASOPHILS ABSOLUTE: 0.03 E9/L (ref 0–0.2)
BASOPHILS RELATIVE PERCENT: 0.5 % (ref 0–2)
BILIRUB SERPL-MCNC: 0.6 MG/DL (ref 0–1.2)
BUN BLDV-MCNC: 20 MG/DL (ref 8–23)
CALCIUM SERPL-MCNC: 10 MG/DL (ref 8.6–10.2)
CHLORIDE BLD-SCNC: 102 MMOL/L (ref 98–107)
CO2: 25 MMOL/L (ref 22–29)
CREAT SERPL-MCNC: 1.1 MG/DL (ref 0.5–1)
EOSINOPHILS ABSOLUTE: 0.4 E9/L (ref 0.05–0.5)
EOSINOPHILS RELATIVE PERCENT: 6.4 % (ref 0–6)
GFR AFRICAN AMERICAN: 58
GFR NON-AFRICAN AMERICAN: 48 ML/MIN/1.73
GLUCOSE BLD-MCNC: 95 MG/DL (ref 74–99)
HCT VFR BLD CALC: 34.1 % (ref 34–48)
HEMOGLOBIN: 10.8 G/DL (ref 11.5–15.5)
IMMATURE GRANULOCYTES #: 0.03 E9/L
IMMATURE GRANULOCYTES %: 0.5 % (ref 0–5)
LYMPHOCYTES ABSOLUTE: 1.36 E9/L (ref 1.5–4)
LYMPHOCYTES RELATIVE PERCENT: 21.9 % (ref 20–42)
MAGNESIUM: 2.1 MG/DL (ref 1.6–2.6)
MCH RBC QN AUTO: 30.3 PG (ref 26–35)
MCHC RBC AUTO-ENTMCNC: 31.7 % (ref 32–34.5)
MCV RBC AUTO: 95.5 FL (ref 80–99.9)
MONOCYTES ABSOLUTE: 0.57 E9/L (ref 0.1–0.95)
MONOCYTES RELATIVE PERCENT: 9.2 % (ref 2–12)
NEUTROPHILS ABSOLUTE: 3.83 E9/L (ref 1.8–7.3)
NEUTROPHILS RELATIVE PERCENT: 61.5 % (ref 43–80)
PDW BLD-RTO: 14 FL (ref 11.5–15)
PHOSPHORUS: 3.8 MG/DL (ref 2.5–4.5)
PLATELET # BLD: 238 E9/L (ref 130–450)
PMV BLD AUTO: 10.2 FL (ref 7–12)
POTASSIUM SERPL-SCNC: 4.1 MMOL/L (ref 3.5–5)
RBC # BLD: 3.57 E12/L (ref 3.5–5.5)
SODIUM BLD-SCNC: 137 MMOL/L (ref 132–146)
TOTAL PROTEIN: 6.4 G/DL (ref 6.4–8.3)
WBC # BLD: 6.2 E9/L (ref 4.5–11.5)

## 2020-12-23 PROCEDURE — A9562 TC99M MERTIATIDE: HCPCS | Performed by: RADIOLOGY

## 2020-12-23 PROCEDURE — 6370000000 HC RX 637 (ALT 250 FOR IP): Performed by: SURGERY

## 2020-12-23 PROCEDURE — 80053 COMPREHEN METABOLIC PANEL: CPT

## 2020-12-23 PROCEDURE — 3430000000 HC RX DIAGNOSTIC RADIOPHARMACEUTICAL: Performed by: RADIOLOGY

## 2020-12-23 PROCEDURE — 85025 COMPLETE CBC W/AUTO DIFF WBC: CPT

## 2020-12-23 PROCEDURE — 6370000000 HC RX 637 (ALT 250 FOR IP): Performed by: NURSE PRACTITIONER

## 2020-12-23 PROCEDURE — 36415 COLL VENOUS BLD VENIPUNCTURE: CPT

## 2020-12-23 PROCEDURE — 83735 ASSAY OF MAGNESIUM: CPT

## 2020-12-23 PROCEDURE — 78708 K FLOW/FUNCT IMAGE W/DRUG: CPT | Performed by: RADIOLOGY

## 2020-12-23 PROCEDURE — 84100 ASSAY OF PHOSPHORUS: CPT

## 2020-12-23 PROCEDURE — 78708 K FLOW/FUNCT IMAGE W/DRUG: CPT

## 2020-12-23 PROCEDURE — 6360000002 HC RX W HCPCS: Performed by: NURSE PRACTITIONER

## 2020-12-23 PROCEDURE — 6370000000 HC RX 637 (ALT 250 FOR IP): Performed by: INTERNAL MEDICINE

## 2020-12-23 PROCEDURE — 2580000003 HC RX 258: Performed by: SURGERY

## 2020-12-23 RX ORDER — PANTOPRAZOLE SODIUM 40 MG/1
40 TABLET, DELAYED RELEASE ORAL
Qty: 30 TABLET | Refills: 3 | Status: SHIPPED | OUTPATIENT
Start: 2020-12-24 | End: 2021-01-25 | Stop reason: ALTCHOICE

## 2020-12-23 RX ORDER — FUROSEMIDE 10 MG/ML
10 INJECTION INTRAMUSCULAR; INTRAVENOUS ONCE
Status: COMPLETED | OUTPATIENT
Start: 2020-12-23 | End: 2020-12-23

## 2020-12-23 RX ADMIN — DULOXETINE HYDROCHLORIDE 60 MG: 60 CAPSULE, DELAYED RELEASE ORAL at 08:08

## 2020-12-23 RX ADMIN — PANTOPRAZOLE SODIUM 40 MG: 40 TABLET, DELAYED RELEASE ORAL at 06:34

## 2020-12-23 RX ADMIN — METOPROLOL TARTRATE 25 MG: 25 TABLET, FILM COATED ORAL at 08:07

## 2020-12-23 RX ADMIN — FUROSEMIDE 10 MG: 20 INJECTION, SOLUTION INTRAMUSCULAR; INTRAVENOUS at 11:08

## 2020-12-23 RX ADMIN — ACETAMINOPHEN 650 MG: 325 TABLET ORAL at 16:12

## 2020-12-23 RX ADMIN — APIXABAN 5 MG: 5 TABLET, FILM COATED ORAL at 08:08

## 2020-12-23 RX ADMIN — SODIUM CHLORIDE, PRESERVATIVE FREE 10 ML: 5 INJECTION INTRAVENOUS at 08:08

## 2020-12-23 RX ADMIN — Medication 7 MILLICURIE: at 10:45

## 2020-12-23 ASSESSMENT — PAIN DESCRIPTION - LOCATION: LOCATION: HEAD

## 2020-12-23 ASSESSMENT — PAIN SCALES - GENERAL
PAINLEVEL_OUTOF10: 0
PAINLEVEL_OUTOF10: 8
PAINLEVEL_OUTOF10: 0
PAINLEVEL_OUTOF10: 0

## 2020-12-23 ASSESSMENT — PAIN DESCRIPTION - PAIN TYPE: TYPE: ACUTE PAIN

## 2020-12-23 NOTE — ADT AUTH CERT
Additional Notes   12/22   BP: 136/81   Pulse: 83   Resp: 18   Temp: 97.8 °F (36.6 °C)      Uro MD:   Denies difficulty urinating   Eating lunch   Denies flank pain    No fever or chills   Unable to get MAG3 today due to IV infiltrating   Nuclear has to order more isotope and won't be here till AM       Assessment/Plan:   Gross hematuria in the setting of anticoagulation for large PE   Solitary left kidney   1.9 cm nonobstructing left renal stone           -Urine cytology pending   -Creatinine stable    -MAG 3 renal lasix scan unable to be performed today due to IV infiltration, planning on doing in Am    -I explained to [facility] that her creatinine is stable, no flank pain, no leukocytosis, or fevers and that this can be performed as an outpatient as long as she is sure to have close follow up with Dr. Kolby Maloney upon discharge to further work up gross hematuria and discuss possible management of this left renal stone in her solitary left kidney in the future.    -Ok for discharge when ok with primary       CSM:   [facility] received referral. Will follow after discharge.        Meds:   Eliquis 5 bid  cymbalta 60 qd  lopressor 25 qd

## 2020-12-23 NOTE — DISCHARGE INSTR - DIET

## 2020-12-23 NOTE — PROGRESS NOTES
12/23/2020 1:45 PM  Service: Urology  Group: [facility] u[facility] (Andrew/Maria Luisa/Jake)    Diane Number  [id number]    Subjective:    Just returned from [place] for [address] to deny pain  No trouble urinating  No fevers    Review of Systems  Constitutional: No fever or chills   Respiratory: negative for cough and hemoptysis  Cardiovascular: negative for chest pain and dyspnea  Gastrointestinal: negative for abdominal pain, diarrhea, nausea and vomiting   : See above  Derm: negative for rash and skin lesion(s)  Neurological: negative for seizures and tremors  Musculoskeletal: Negative    Psychiatric: Negative   All other reviews are negative      Scheduled Meds:   bisacodyl  10 mg Rectal Once    apixaban  5 mg Oral BID    pantoprazole  40 mg Oral QAM AC    metoprolol tartrate  25 mg Oral Daily    DULoxetine  60 mg Oral Daily    sodium chloride flush  10 mL Intravenous 2 times per day       Objective:  Vitals:    12/23/20 0807   BP: 130/81   Pulse: 83   Resp: 18   Temp: 98.3 °F (36.8 °C)   SpO2: 95%         Allergies: Penicillins    General Appearance: alert and oriented to person, place and time and in no acute distress  Skin: no rash or erythema  Head: normocephalic and atraumatic  Pulmonary/Chest: normal air movement, no respiratory distress  Abdomen: soft, non-tender, non-distended  Genitourinary: No Easley  Extremities: no cyanosis, clubbing or edema         Labs:     Recent Labs     12/23/20  0459      K 4.1      CO2 25   BUN 20   CREATININE 1.1*   GLUCOSE 95   CALCIUM 10.0       Lab Results   Component Value Date    HGB 10.8 12/23/2020    HCT 34.1 12/23/2020         Assessment/Plan:  Gross hematuria in the setting of anticoagulation for large PE  Solitary left kidney  1.9 cm nonobstructing left renal stone      -Urine cytology unremarkable   -Creatinine stable   -MAG 3 renal lasix pending, if no obstruction ok for discharge -She is to have close follow up with Dr. Tram Cisneros upon discharge to further work up gross hematuria and discuss possible management of this left renal stone in her solitary left kidney in the future. MANJIT Porter - [facility]  Urology    I agree with the nurse practitioner assessment and plan. I personally evaluated the patient and made any changes to reflect my impression and plan. Follow up with Dr. Sujey Rivers.    Likely needs laser lithotripsy as she will be unlikely to be able to come off Eliquis at this time with DONAVON Claros MD

## 2020-12-23 NOTE — CARE COORDINATION
Discharge order on chart. [facility] AT [facility] orders on chart for vitals and med compliance. [facility] was set up for PT and OT. [facility] notified. Met with pt in room. Voiced no other needs for home. States her sister will provide transportation.

## 2020-12-23 NOTE — PROGRESS NOTES
CLINICAL PHARMACY NOTE: MEDS TO [address] Select Patient?: Yes  Total # of Prescriptions Filled: 3   The following medications were delivered to the patient:  · Eliquis 5 mg   · Metoprolol tartrate 25 mg   · Pantoprazole 40 mg     Total # of Interventions Completed: 2  Time Spent (min): 30    Additional Documentation:

## 2020-12-23 NOTE — DISCHARGE SUMMARY
Physician Discharge Summary     Patient ID:  Sy Dupree  [id number]  68 y.o.  1940    Admit date: 12/15/2020    Discharge date and time: 12/23/2020    Admission Diagnoses: PE (pulmonary thromboembolism) ([facility] [address] [I26.99]    Discharge Diagnoses:   Acute saddle embolus of pulmonary arterial system with acute cor pulmonale  Hematuria from left kidney stone  Acute hypoxic respiratory failure  Acute blood loss anemia  DVT right lower extremity  Patient Active Problem List   Diagnosis    Hypertension, essential, benign    Hyperlipidemia    History of DVT (deep vein thrombosis)    Superficial phlebitis of leg    Fibromyalgia    Open-angle glaucoma of both eyes, mild stage    History of total right knee replacement    Disc disease, degenerative, lumbar or lumbosacral    Impaired fasting glucose    Age-related cataract of both eyes    Dyspnea on exertion    Acute non-recurrent maxillary sinusitis    Tachycardia    Chronic sinusitis    PE (pulmonary thromboembolism) ([facility] [address]    Acute saddle pulmonary embolism with acute cor pulmonale (HCC)       Consults: Vascular and pulmonary and urology  Procedures: EKOS catheter directed thrombolysis with [facility] Course:   72-year-old  woman  Admitted through [facility] emergency room due to acute hypoxic respiratory failure from saddle embolus of the pulmonary arterial system  Seen by vascular and patient underwent EKOS catheter directed thrombolysis with TPA  This was followed by heparin infusion  Later switched to oral Eliquis  She remained stable during the rest of the hospital course  Echocardiogram showing right ventricular dysfunction  Then she developed hematuria which spontaneously resolved  She has a known history of kidney stone on the left she is urine nephric  Seen by urology  Hematuria resolved  Discharged home in stable condition to be followed as an outpatient by urology and PCP  Discharge Exam Seen on the floor earlier this morning  Hematuria resolved  Hypoxia resolved  On room air only  Oral mucosa moist  Lungs were clear  Heart regular rate and rhythm  Extremities showed no significant edema  Data reviewed in detail with her    Disposition: Home with home care  Stable at the time of discharge  Patient Instructions:   Current Discharge Medication List      START taking these medications    Details   apixaban (ELIQUIS) 5 MG TABS tablet Take 2 tablets by mouth 2 times daily for 5 days Then on 5 mg twice a day  Qty: 60 tablet, Refills: 5      pantoprazole (PROTONIX) 40 MG tablet Take 1 tablet by mouth every morning (before breakfast)  Qty: 30 tablet, Refills: 3      Elastic Bandages & Supports (JOBST KNEE HIGH COMPRESSION SM) MISC Dx: Varicose veins of the legs with pain and swelling. Bilateral knee-high 8-15 mm Hg compression stockings.   Qty: 2 each, Refills: 2         CONTINUE these medications which have CHANGED    Details   metoprolol tartrate (LOPRESSOR) 25 MG tablet Take 1 tablet by mouth 2 times daily  Qty: 90 tablet, Refills: 3         CONTINUE these medications which have NOT CHANGED    Details   simvastatin (ZOCOR) 40 MG tablet Take 1 tablet by mouth nightly  Qty: 90 tablet, Refills: 3      DULoxetine (CYMBALTA) 60 MG extended release capsule Take 1 capsule by mouth daily  Qty: 30 capsule, Refills: 5      docusate sodium (COLACE) 100 MG capsule Take 100 mg by mouth      timolol (TIMOPTIC) 0.5 % ophthalmic solution       Handicap Placard MISC by Does not apply route Duration 5 years  Qty: 1 each, Refills: 0    Associated Diagnoses: Disc disease, degenerative, lumbar or lumbosacral      Psyllium (VEGETABLE LAXATIVE PO) Take by mouth         STOP taking these medications       meloxicam (MOBIC) 15 MG tablet Comments:   Reason for Stopping:         Multiple Vitamins-Minerals (ICAPS PO) Comments:   Reason for Stopping:         hydrochlorothiazide (HYDRODIURIL) 25 MG tablet Comments: Reason for Stopping:             Activity: As tolerated  Diet: General    Follow-up with PCP and urology    Note that over 40 minutes was spent in preparing discharge papers, discussing discharge with patient, medication review, etc.    Signed:  Gaurav Carbone MD  12/23/2020  1:53 PM

## 2020-12-28 ENCOUNTER — TELEPHONE (OUTPATIENT)
Dept: ADMINISTRATIVE | Age: 80
End: 2020-12-28

## 2020-12-28 NOTE — TELEPHONE ENCOUNTER
Patient canceled Hospital f/u due to conflicting appointments. Wants PCP to know the RN is coming to house to check on her periodically. She will r/s soon and is not avoiding him.

## 2020-12-29 ENCOUNTER — TELEPHONE (OUTPATIENT)
Dept: VASCULAR SURGERY | Age: 80
End: 2020-12-29

## 2020-12-29 NOTE — TELEPHONE ENCOUNTER
Spoke with Esa Jaramillo at [facility], ordered a BMP for Saturday, 1-2-21.  Spoke with patient today, will repeat BMP and she will need scheduled for CTA chest and echo (call sister, Eduar Chaney, for arrangements for testing [phone])

## 2020-12-30 ENCOUNTER — TELEPHONE (OUTPATIENT)
Dept: PHARMACY | Facility: CLINIC | Age: 80
End: 2020-12-30

## 2020-12-30 DIAGNOSIS — Z79.899 ENCOUNTER FOR MEDICATION REVIEW: Primary | ICD-10-CM

## 2020-12-30 PROCEDURE — 1111F DSCHRG MED/CURRENT MED MERGE: CPT

## 2020-12-30 NOTE — TELEPHONE ENCOUNTER
CLINICAL PHARMACY NOTE  Post-Discharge Transitions of Care ([facility])    Non-face-to-face services provided:  Assessment and support for treatment adherence and medication management-- med rec    Subjective/Objective:  Navarro Tovar is a 68 y.o. female. Patient was discharged from [facility] on 12/23/20 with a diagnosis of Acute saddle pulmonary embolism with acute cor pulmonale. Patient outreach to review discharge medications and provide medication review and management. Spoke with patient. No pain or SOB. Patient feeling good. Allergies   Allergen Reactions    Penicillins        Discharge Medications (as per current medication list/AVS):  There are NEW medications for you. START taking them after you leave the hospital:    eliquis 5 mg BID    pantoprazole (PROTONIX) 40 MG tablet Take 1 tablet by mouth every morning (before breakfast)  -pt does not have gerd and will ask if she can stop this       You told us you were taking these medications at home, but the amount or how often you take this medication has CHANGED:  metoprolol tartrate (LOPRESSOR) 25 MG tablet Take 1 tablet by mouth 2 times daily     These are medications you told us you were taking at home, CONTINUE taking them after you leave the hospital:  Current Outpatient Medications   Medication Sig    Elastic Bandages & Supports (JOBST KNEE HIGH COMPRESSION SM) MISC Dx: Varicose veins of the legs with pain and swelling. Bilateral knee-high 8-15 mm Hg compression stockings.     simvastatin (ZOCOR) 40 MG tablet Take 1 tablet by mouth nightly    Handicap Placard MISC by Does not apply route Duration 5 years    DULoxetine (CYMBALTA) 60 MG extended release capsule Take 1 capsule by mouth daily    Psyllium (VEGETABLE LAXATIVE PO) Take by mouth  - does not use    docusate sodium (COLACE) 100 MG capsule Take 100 mg by mouth    timolol (TIMOPTIC) 0.5 % ophthalmic solution - therapy completed     These are the medications you

## 2021-01-02 LAB
ANION GAP SERPL CALCULATED.3IONS-SCNC: 10 MMOL/L (ref 7–16)
BUN BLDV-MCNC: 16 MG/DL (ref 8–23)
CALCIUM SERPL-MCNC: 10.5 MG/DL (ref 8.6–10.2)
CHLORIDE BLD-SCNC: 99 MMOL/L (ref 98–107)
CO2: 26 MMOL/L (ref 22–29)
CREAT SERPL-MCNC: 1.2 MG/DL (ref 0.5–1)
GFR AFRICAN AMERICAN: 52
GFR NON-AFRICAN AMERICAN: 43 ML/MIN/1.73
GLUCOSE BLD-MCNC: 120 MG/DL (ref 74–99)
POTASSIUM SERPL-SCNC: 4.6 MMOL/L (ref 3.5–5)
SODIUM BLD-SCNC: 135 MMOL/L (ref 132–146)

## 2021-01-04 DIAGNOSIS — I26.02 ACUTE SADDLE PULMONARY EMBOLISM WITH ACUTE COR PULMONALE (HCC): Primary | ICD-10-CM

## 2021-01-05 ENCOUNTER — TELEPHONE (OUTPATIENT)
Dept: VASCULAR SURGERY | Age: 81
End: 2021-01-05

## 2021-01-05 DIAGNOSIS — I26.02 ACUTE SADDLE PULMONARY EMBOLISM WITH ACUTE COR PULMONALE (HCC): Primary | ICD-10-CM

## 2021-01-05 NOTE — TELEPHONE ENCOUNTER
Notified Abilio Ocampo of echo at [facility] on Tuesday, 1-19-21 at 2:00 pm. [place] at 1:30 pm. No powders or lotions on chest. Follow up with Dr. Jocelin Quinn on 1-25-21 at 3:45 pm.

## 2021-01-06 ENCOUNTER — HOSPITAL ENCOUNTER (OUTPATIENT)
Age: 81
Discharge: HOME | End: 2021-01-06
Payer: MEDICARE

## 2021-01-06 LAB — CALCIUM SERPL-MCNC: 10.8 MG/DL (ref 8.6–10.2)

## 2021-01-06 PROCEDURE — 83970 ASSAY OF PARATHORMONE: CPT

## 2021-01-06 PROCEDURE — 36415 COLL VENOUS BLD VENIPUNCTURE: CPT

## 2021-01-06 PROCEDURE — 82310 ASSAY OF CALCIUM: CPT

## 2021-01-12 LAB
Lab: NORMAL
REPORT: NORMAL
THIS TEST SENT TO: NORMAL

## 2021-01-19 ENCOUNTER — HOSPITAL ENCOUNTER (OUTPATIENT)
Dept: NON INVASIVE DIAGNOSTICS | Age: 81
Discharge: HOME | End: 2021-01-19
Payer: MEDICARE

## 2021-01-19 DIAGNOSIS — I26.02 ACUTE SADDLE PULMONARY EMBOLISM WITH ACUTE COR PULMONALE (HCC): ICD-10-CM

## 2021-01-19 LAB
LV EF: 63 %
LVEF MODALITY: NORMAL

## 2021-01-19 PROCEDURE — 93306 TTE W/DOPPLER COMPLETE: CPT

## 2021-01-21 ENCOUNTER — TELEPHONE (OUTPATIENT)
Dept: VASCULAR SURGERY | Age: 81
End: 2021-01-21

## 2021-01-25 ENCOUNTER — OFFICE VISIT (OUTPATIENT)
Dept: VASCULAR SURGERY | Age: 81
End: 2021-01-25
Payer: MEDICARE

## 2021-01-25 DIAGNOSIS — I82.411 ACUTE DEEP VEIN THROMBOSIS (DVT) OF FEMORAL VEIN OF RIGHT LOWER EXTREMITY (HCC): Primary | ICD-10-CM

## 2021-01-25 PROCEDURE — 99212 OFFICE O/P EST SF 10 MIN: CPT | Performed by: NURSE PRACTITIONER

## 2021-01-25 RX ORDER — M-VIT,TX,IRON,MINS/CALC/FOLIC 27MG-0.4MG
1 TABLET ORAL DAILY
COMMUNITY
End: 2022-04-25

## 2021-01-25 NOTE — PROGRESS NOTES
1/25/2021    Osvaldo Harding  1940    Previous Procedures  12/16/20 Catheter directed PE lysis, EKOS     Patient returns for post operative evaluation. It was done for hx of massive pulmonary embolus and R LE DVT. Patient has no complaints with shortness of breath. They are able to walk without problem. Overall patient is feeling much better than when first admitted to hospital with this issue, however she feels overall weak and tired. She does have some right leg swelling. She is not using compression stockings. The patient denies any unexpected problems since the procedure. She intermittently has some hematuria and has an appointment in [place] to address her kidney stone later this week. She remains on eliquis. She has not yet followed up with pulmonary. Physical Exam:    Gen Awake and Alert  [facility] RRR   R LE  · femoral puncture site is soft without evidence of infection or hematoma  leg swelling slight    A/P S/P Pulmonary Embolism Lysis    Hx of R LE DVT  · pt has had improvement of their symptoms  · pt does not have residual shortness of breath at this time  · Echocardiogram reviewed  · RV Function is normal  · RV Dilation is normal  · CTA of the Chest not done  · pt is currently on eliquis for anticoagulation  · Therapeutic anticoagulation for at least 12 months from vascular pov  · Recommend follow up with pulmonology to determine if lifelong therapy is needed  · Elevate bilateral LE while in bed or sitting  · Compression stockings knee high 8-15 mm hg wear daily - emphasized importance of daily and lifetime use  · F/U as outpatient in 12 months with ultrasound of R LE   · Call if patient develops worsening of swelling or wounds  · discussed with patient pathophysiology of DVT, PE, and thrombophlebitis   · All ?s answered    Pt seen and plan reviewed with Dr. Lily Ash.      Mary Arevalo, CNP

## 2021-02-11 ENCOUNTER — TELEPHONE (OUTPATIENT)
Dept: ADMINISTRATIVE | Age: 81
End: 2021-02-11

## 2021-02-11 NOTE — TELEPHONE ENCOUNTER
Patient inquiring about Urine culture 2 weeks prior to Kidney stone surgery with [facility] on 3/18. Patient also inquiring about Covid test 72 hour prior to 3/18 surgery. Patient scheduled for pre-op on 2/19 . Patient also inquiring about lab orders.

## 2021-02-17 ENCOUNTER — TELEPHONE (OUTPATIENT)
Dept: PRIMARY CARE CLINIC | Age: 81
End: 2021-02-17

## 2021-02-17 DIAGNOSIS — R73.01 IMPAIRED FASTING GLUCOSE: ICD-10-CM

## 2021-02-17 DIAGNOSIS — I10 HYPERTENSION, ESSENTIAL, BENIGN: Primary | Chronic | ICD-10-CM

## 2021-02-17 DIAGNOSIS — E78.5 HYPERLIPIDEMIA, UNSPECIFIED HYPERLIPIDEMIA TYPE: Chronic | ICD-10-CM

## 2021-02-17 NOTE — TELEPHONE ENCOUNTER
The pt is coming to see you for a pre op appointment 2/19.  Her sister is calling because she would like to take the pt tomorrow to get her labs drawn, can you please place an order in the computer

## 2021-02-18 ENCOUNTER — HOSPITAL ENCOUNTER (OUTPATIENT)
Age: 81
Discharge: HOME | End: 2021-02-18
Payer: MEDICARE

## 2021-02-18 DIAGNOSIS — E78.5 HYPERLIPIDEMIA, UNSPECIFIED HYPERLIPIDEMIA TYPE: Chronic | ICD-10-CM

## 2021-02-18 DIAGNOSIS — I10 HYPERTENSION, ESSENTIAL, BENIGN: Chronic | ICD-10-CM

## 2021-02-18 DIAGNOSIS — R73.01 IMPAIRED FASTING GLUCOSE: ICD-10-CM

## 2021-02-18 LAB
BASOPHILS ABSOLUTE: 0.02 E9/L (ref 0–0.2)
BASOPHILS RELATIVE PERCENT: 0.4 % (ref 0–2)
CHOLESTEROL, TOTAL: 127 MG/DL (ref 0–199)
EOSINOPHILS ABSOLUTE: 0.17 E9/L (ref 0.05–0.5)
EOSINOPHILS RELATIVE PERCENT: 3.2 % (ref 0–6)
HBA1C MFR BLD: 5.4 % (ref 4–5.6)
HCT VFR BLD CALC: 39.6 % (ref 34–48)
HDLC SERPL-MCNC: 38 MG/DL
HEMOGLOBIN: 12.6 G/DL (ref 11.5–15.5)
IMMATURE GRANULOCYTES #: 0.01 E9/L
IMMATURE GRANULOCYTES %: 0.2 % (ref 0–5)
LDL CHOLESTEROL CALCULATED: 61 MG/DL (ref 0–99)
LYMPHOCYTES ABSOLUTE: 1.17 E9/L (ref 1.5–4)
LYMPHOCYTES RELATIVE PERCENT: 22.3 % (ref 20–42)
MCH RBC QN AUTO: 29 PG (ref 26–35)
MCHC RBC AUTO-ENTMCNC: 31.8 % (ref 32–34.5)
MCV RBC AUTO: 91.2 FL (ref 80–99.9)
MONOCYTES ABSOLUTE: 0.41 E9/L (ref 0.1–0.95)
MONOCYTES RELATIVE PERCENT: 7.8 % (ref 2–12)
NEUTROPHILS ABSOLUTE: 3.46 E9/L (ref 1.8–7.3)
NEUTROPHILS RELATIVE PERCENT: 66.1 % (ref 43–80)
PDW BLD-RTO: 14 FL (ref 11.5–15)
PLATELET # BLD: 279 E9/L (ref 130–450)
PMV BLD AUTO: 9.9 FL (ref 7–12)
RBC # BLD: 4.34 E12/L (ref 3.5–5.5)
T4 TOTAL: 8 MCG/DL (ref 4.5–11.7)
TRIGL SERPL-MCNC: 140 MG/DL (ref 0–149)
TSH SERPL DL<=0.05 MIU/L-ACNC: 2.96 UIU/ML (ref 0.27–4.2)
VLDLC SERPL CALC-MCNC: 28 MG/DL
WBC # BLD: 5.2 E9/L (ref 4.5–11.5)

## 2021-02-18 PROCEDURE — 80061 LIPID PANEL: CPT

## 2021-02-18 PROCEDURE — 83036 HEMOGLOBIN GLYCOSYLATED A1C: CPT

## 2021-02-18 PROCEDURE — 80053 COMPREHEN METABOLIC PANEL: CPT

## 2021-02-18 PROCEDURE — 36415 COLL VENOUS BLD VENIPUNCTURE: CPT

## 2021-02-18 PROCEDURE — 84443 ASSAY THYROID STIM HORMONE: CPT

## 2021-02-18 PROCEDURE — 84436 ASSAY OF TOTAL THYROXINE: CPT

## 2021-02-18 PROCEDURE — 85025 COMPLETE CBC W/AUTO DIFF WBC: CPT

## 2021-02-19 ENCOUNTER — OFFICE VISIT (OUTPATIENT)
Dept: PRIMARY CARE CLINIC | Age: 81
End: 2021-02-19
Payer: MEDICARE

## 2021-02-19 VITALS
SYSTOLIC BLOOD PRESSURE: 120 MMHG | WEIGHT: 195 LBS | TEMPERATURE: 98.6 F | DIASTOLIC BLOOD PRESSURE: 82 MMHG | HEART RATE: 92 BPM | OXYGEN SATURATION: 95 % | BODY MASS INDEX: 27.98 KG/M2

## 2021-02-19 DIAGNOSIS — E78.5 HYPERLIPIDEMIA, UNSPECIFIED HYPERLIPIDEMIA TYPE: Chronic | ICD-10-CM

## 2021-02-19 DIAGNOSIS — N20.0 KIDNEY STONE: ICD-10-CM

## 2021-02-19 DIAGNOSIS — I26.02 ACUTE SADDLE PULMONARY EMBOLISM WITH ACUTE COR PULMONALE (HCC): ICD-10-CM

## 2021-02-19 DIAGNOSIS — I26.99 PE (PULMONARY THROMBOEMBOLISM) (HCC): ICD-10-CM

## 2021-02-19 DIAGNOSIS — Z01.818 PRE-OP EXAM: Primary | ICD-10-CM

## 2021-02-19 DIAGNOSIS — I10 HYPERTENSION, ESSENTIAL, BENIGN: Chronic | ICD-10-CM

## 2021-02-19 LAB
ALBUMIN SERPL-MCNC: 3.9 G/DL (ref 3.5–5.2)
ALP BLD-CCNC: 33 U/L (ref 35–104)
ALT SERPL-CCNC: 9 U/L (ref 0–32)
ANION GAP SERPL CALCULATED.3IONS-SCNC: 9 MMOL/L (ref 7–16)
AST SERPL-CCNC: 16 U/L (ref 0–31)
BILIRUB SERPL-MCNC: 0.4 MG/DL (ref 0–1.2)
BUN BLDV-MCNC: 16 MG/DL (ref 8–23)
CALCIUM SERPL-MCNC: 10.4 MG/DL (ref 8.6–10.2)
CHLORIDE BLD-SCNC: 103 MMOL/L (ref 98–107)
CO2: 27 MMOL/L (ref 22–29)
CREAT SERPL-MCNC: 1.2 MG/DL (ref 0.5–1)
GFR AFRICAN AMERICAN: 52
GFR NON-AFRICAN AMERICAN: 43 ML/MIN/1.73
GLUCOSE BLD-MCNC: 102 MG/DL (ref 74–99)
POTASSIUM SERPL-SCNC: 4.1 MMOL/L (ref 3.5–5)
SODIUM BLD-SCNC: 139 MMOL/L (ref 132–146)
TOTAL PROTEIN: 7.3 G/DL (ref 6.4–8.3)

## 2021-02-19 PROCEDURE — 93000 ELECTROCARDIOGRAM COMPLETE: CPT | Performed by: FAMILY MEDICINE

## 2021-02-19 PROCEDURE — 99214 OFFICE O/P EST MOD 30 MIN: CPT | Performed by: FAMILY MEDICINE

## 2021-02-19 RX ORDER — SIMVASTATIN 40 MG
40 TABLET ORAL NIGHTLY
Qty: 90 TABLET | Refills: 1 | Status: SHIPPED | OUTPATIENT
Start: 2021-02-19 | End: 2021-09-29 | Stop reason: SDUPTHER

## 2021-02-19 ASSESSMENT — ENCOUNTER SYMPTOMS
GASTROINTESTINAL NEGATIVE: 1
ALLERGIC/IMMUNOLOGIC NEGATIVE: 1
RESPIRATORY NEGATIVE: 1
EYES NEGATIVE: 1

## 2021-02-19 NOTE — PROGRESS NOTES
21     Jessie Cramer    : 1940 Sex: female   Age: 68 y.o. Chief Complaint   Patient presents with    Pre-op Exam     kidney stone removal @ [facility]    Discuss Labs       Prior to Admission medications    Medication Sig Start Date End Date Taking? Authorizing Provider   Multiple Vitamins-Minerals (THERAPEUTIC MULTIVITAMIN-MINERALS) tablet Take 1 tablet by mouth daily   Yes Historical Provider, MD   apixaban (ELIQUIS) 5 MG TABS tablet Take 5 mg by mouth 2 times daily   Yes Historical Provider, MD   metoprolol tartrate (LOPRESSOR) 25 MG tablet Take 1 tablet by mouth 2 times daily 20  Yes Octavio Ibarra MD   Elastic Bandages & Supports (JOBST KNEE HIGH COMPRESSION SM) MISC Dx: Varicose veins of the legs with pain and swelling. Bilateral knee-high 8-15 mm Hg compression stockings. 20  Yes MANJIT Collins - CNP   simvastatin (ZOCOR) 40 MG tablet Take 1 tablet by mouth nightly 20  Yes Perry Monroy DO   Handicap Placard MISC by Does not apply route Duration 5 years 20  Yes Perry Monroy DO   DULoxetine (CYMBALTA) 60 MG extended release capsule Take 1 capsule by mouth daily 20  Yes Arleen Guillory DO   docusate sodium (COLACE) 100 MG capsule Take 100 mg by mouth daily as needed for Constipation  11  Yes Historical Provider, MD          HPI: Seen today in follow-up for preop exam.  History of pulmonary emboli and currently on Eliquis doing well. Hypertension hyperlipidemia stable. History of renal stone in the left kidney and plan for cystoscopy and ureteroscope in [facility]. Today for clearance and is doing well medically. Low risk for the recommended surgery. Review of Systems   Constitutional: Negative. HENT: Negative. Eyes: Negative. Respiratory: Negative. Gastrointestinal: Negative. Endocrine: Negative. Genitourinary: Negative. Musculoskeletal: Negative. Skin: Negative. Allergic/Immunologic: Negative. Neurological: Negative. Hematological: Negative. Psychiatric/Behavioral: Negative. Today system review is stable. We will maintain current meds and care. Requesting Covid testing as well as urine culture prior to surgery and orders placed today. Current Outpatient Medications:     Multiple Vitamins-Minerals (THERAPEUTIC MULTIVITAMIN-MINERALS) tablet, Take 1 tablet by mouth daily, Disp: , Rfl:     apixaban (ELIQUIS) 5 MG TABS tablet, Take 5 mg by mouth 2 times daily, Disp: , Rfl:     metoprolol tartrate (LOPRESSOR) 25 MG tablet, Take 1 tablet by mouth 2 times daily, Disp: 90 tablet, Rfl: 3    Elastic Bandages & Supports (JOBST KNEE HIGH COMPRESSION SM) MISC, Dx: Varicose veins of the legs with pain and swelling. Bilateral knee-high 8-15 mm Hg compression stockings. , Disp: 2 each, Rfl: 2    simvastatin (ZOCOR) 40 MG tablet, Take 1 tablet by mouth nightly, Disp: 90 tablet, Rfl: 3    Handicap Placard MISC, by Does not apply route Duration 5 years, Disp: 1 each, Rfl: 0    DULoxetine (CYMBALTA) 60 MG extended release capsule, Take 1 capsule by mouth daily, Disp: 30 capsule, Rfl: 5    docusate sodium (COLACE) 100 MG capsule, Take 100 mg by mouth daily as needed for Constipation , Disp: , Rfl:     Allergies   Allergen Reactions    Penicillins        Social History     Tobacco Use    Smoking status: Never Smoker    Smokeless tobacco: Never Used   Substance Use Topics    Alcohol use: Never     Frequency: Never    Drug use: Never      Past Surgical History:   Procedure Laterality Date    APPENDECTOMY      BLADDER STONE REMOVAL      COLONOSCOPY      DILATION AND CURETTAGE OF UTERUS      HERNIA REPAIR      TOTAL KNEE ARTHROPLASTY      right    TOTAL NEPHRECTOMY      right     Family History   Problem Relation Age of Onset    Stroke Mother     Other Mother         phlebitis     Past Medical History:   Diagnosis Date    Acute saddle pulmonary embolism with acute cor pulmonale (HCC) 12/16/2020    Arthritis     Cataracts, bilateral     Cystitis     Depression     DVT of lower limb, acute (HCC) 03/15/2013    Glaucoma     s/p stent placement    Hernia     HIGH CHOLESTEROL     Hypertension     Kidney stones     Sinus infection     Superficial phlebitis of leg 10/30/2014       Vitals:    02/19/21 1106   BP: 120/82   Pulse: 92   Temp: 98.6 °F (37 °C)   SpO2: 95%   Weight: 195 lb (88.5 kg)     BP Readings from Last 3 Encounters:   02/19/21 120/82   12/23/20 130/81   11/19/20 126/80    110/64    Physical Exam  Vitals signs and nursing note reviewed. Constitutional:       Appearance: She is well-developed. HENT:      Head: Normocephalic. Right Ear: External ear normal.      Left Ear: External ear normal.      Nose: Nose normal.   Eyes:      Conjunctiva/sclera: Conjunctivae normal.      Pupils: Pupils are equal, round, and reactive to light. Neck:      Musculoskeletal: Normal range of motion and neck supple. Cardiovascular:      Rate and Rhythm: Normal rate. Pulmonary:      Breath sounds: Normal breath sounds. Abdominal:      General: Bowel sounds are normal.      Palpations: Abdomen is soft. Musculoskeletal: Normal range of motion. Skin:     General: Skin is warm and dry. Neurological:      Mental Status: She is alert and oriented to person, place, and time. Psychiatric:         Behavior: Behavior normal.     Today's vitals physical examination stable. We will maintain present meds and care. May undergo surgery as recommended considered low risk for low risk procedure. Reassessment 4 to 6 weeks. Plan Per Assessment:  Helena Jimenez was seen today for pre-op exam and discuss labs. Diagnoses and all orders for this visit:    Pre-op exam  -     EKG 12 lead; Future  -     EKG 12 lead  -     COVID-19 Ambulatory; Future  -     Culture, Urine;  Future    Acute saddle pulmonary embolism with acute cor pulmonale (HCC)    PE (pulmonary thromboembolism) (HCC)    Hypertension, essential, benign  -     CBC Auto Differential; Future  -     Comprehensive Metabolic Panel; Future    Hyperlipidemia, unspecified hyperlipidemia type  -     CBC Auto Differential; Future  -     Comprehensive Metabolic Panel; Future    Kidney stone  -     CBC Auto Differential; Future  -     Comprehensive Metabolic Panel; Future  -     COVID-19 Ambulatory; Future  -     Culture, Urine; Future            Return in about 6 weeks (around 4/2/2021). Terrye Neither, DO    Note was generated with the assistance of voice recognition software. Document was reviewed however may contain grammatical errors.

## 2021-02-27 ENCOUNTER — HOSPITAL ENCOUNTER (OUTPATIENT)
Age: 81
Discharge: HOME | End: 2021-02-27
Payer: MEDICARE

## 2021-02-27 PROCEDURE — 87088 URINE BACTERIA CULTURE: CPT

## 2021-02-27 PROCEDURE — 87186 SC STD MICRODIL/AGAR DIL: CPT

## 2021-02-27 PROCEDURE — 87077 CULTURE AEROBIC IDENTIFY: CPT

## 2021-03-02 LAB
ORGANISM: ABNORMAL
URINE CULTURE, ROUTINE: ABNORMAL

## 2021-03-02 RX ORDER — SULFAMETHOXAZOLE AND TRIMETHOPRIM 800; 160 MG/1; MG/1
1 TABLET ORAL 2 TIMES DAILY
Qty: 14 TABLET | Refills: 0 | Status: SHIPPED | OUTPATIENT
Start: 2021-03-02 | End: 2021-03-09

## 2021-03-05 ENCOUNTER — HOSPITAL ENCOUNTER (OUTPATIENT)
Age: 81
Discharge: HOME | End: 2021-03-07
Payer: MEDICARE

## 2021-03-05 PROCEDURE — U0003 INFECTIOUS AGENT DETECTION BY NUCLEIC ACID (DNA OR RNA); SEVERE ACUTE RESPIRATORY SYNDROME CORONAVIRUS 2 (SARS-COV-2) (CORONAVIRUS DISEASE [COVID-19]), AMPLIFIED PROBE TECHNIQUE, MAKING USE OF HIGH THROUGHPUT TECHNOLOGIES AS DESCRIBED BY CMS-2020-01-R: HCPCS

## 2021-03-06 LAB — SARS-COV-2, PCR: NOT DETECTED

## 2021-03-09 DIAGNOSIS — Z01.818 PRE-OP EXAM: ICD-10-CM

## 2021-03-09 DIAGNOSIS — N20.0 KIDNEY STONE: ICD-10-CM

## 2021-03-10 LAB
SARS-COV-2: NOT DETECTED
SOURCE: NORMAL

## 2021-03-20 ENCOUNTER — HOSPITAL ENCOUNTER (OUTPATIENT)
Age: 81
Discharge: HOME | End: 2021-03-20
Payer: MEDICARE

## 2021-03-20 LAB
ANION GAP SERPL CALCULATED.3IONS-SCNC: 10 MMOL/L (ref 7–16)
BUN BLDV-MCNC: 13 MG/DL (ref 8–23)
CALCIUM SERPL-MCNC: 9.7 MG/DL (ref 8.6–10.2)
CHLORIDE BLD-SCNC: 104 MMOL/L (ref 98–107)
CO2: 27 MMOL/L (ref 22–29)
CREAT SERPL-MCNC: 1.3 MG/DL (ref 0.5–1)
GFR AFRICAN AMERICAN: 48
GFR NON-AFRICAN AMERICAN: 40 ML/MIN/1.73
GLUCOSE BLD-MCNC: 103 MG/DL (ref 74–99)
POTASSIUM SERPL-SCNC: 4.1 MMOL/L (ref 3.5–5)
SODIUM BLD-SCNC: 141 MMOL/L (ref 132–146)

## 2021-03-20 PROCEDURE — 82542 COL CHROMOTOGRAPHY QUAL/QUAN: CPT

## 2021-03-20 PROCEDURE — 36415 COLL VENOUS BLD VENIPUNCTURE: CPT

## 2021-03-20 PROCEDURE — 80048 BASIC METABOLIC PNL TOTAL CA: CPT

## 2021-04-11 LAB — PTH RELATED PEPTIDE: <2 PMOL/L (ref 0–3.4)

## 2021-04-13 ENCOUNTER — IMMUNIZATION (OUTPATIENT)
Dept: PRIMARY CARE CLINIC | Age: 81
End: 2021-04-13
Payer: MEDICARE

## 2021-04-13 PROCEDURE — 91300 COVID-19, PFIZER VACCINE 30MCG/0.3ML DOSE: CPT | Performed by: NURSE PRACTITIONER

## 2021-04-13 PROCEDURE — 0001A COVID-19, PFIZER VACCINE 30MCG/0.3ML DOSE: CPT | Performed by: NURSE PRACTITIONER

## 2021-04-28 ENCOUNTER — APPOINTMENT (OUTPATIENT)
Dept: CT IMAGING | Age: 81
End: 2021-04-28
Payer: MEDICARE

## 2021-04-28 ENCOUNTER — HOSPITAL ENCOUNTER (EMERGENCY)
Age: 81
Discharge: HOME | End: 2021-04-28
Attending: EMERGENCY MEDICINE
Payer: MEDICARE

## 2021-04-28 VITALS
HEART RATE: 88 BPM | SYSTOLIC BLOOD PRESSURE: 143 MMHG | HEIGHT: 55 IN | OXYGEN SATURATION: 95 % | DIASTOLIC BLOOD PRESSURE: 77 MMHG | BODY MASS INDEX: 43.97 KG/M2 | TEMPERATURE: 97.9 F | WEIGHT: 190 LBS | RESPIRATION RATE: 16 BRPM

## 2021-04-28 DIAGNOSIS — R07.89 ATYPICAL CHEST PAIN: Primary | ICD-10-CM

## 2021-04-28 LAB
ALBUMIN SERPL-MCNC: 4 G/DL (ref 3.5–5.2)
ALP BLD-CCNC: 36 U/L (ref 35–104)
ALT SERPL-CCNC: 8 U/L (ref 0–32)
ANION GAP SERPL CALCULATED.3IONS-SCNC: 10 MMOL/L (ref 7–16)
APTT: 30.9 SEC (ref 24.5–35.1)
AST SERPL-CCNC: 16 U/L (ref 0–31)
BASOPHILS ABSOLUTE: 0.02 E9/L (ref 0–0.2)
BASOPHILS RELATIVE PERCENT: 0.3 % (ref 0–2)
BILIRUB SERPL-MCNC: 0.3 MG/DL (ref 0–1.2)
BUN BLDV-MCNC: 22 MG/DL (ref 6–23)
CALCIUM SERPL-MCNC: 10.6 MG/DL (ref 8.6–10.2)
CHLORIDE BLD-SCNC: 106 MMOL/L (ref 98–107)
CO2: 28 MMOL/L (ref 22–29)
CREAT SERPL-MCNC: 1.4 MG/DL (ref 0.5–1)
EOSINOPHILS ABSOLUTE: 0.19 E9/L (ref 0.05–0.5)
EOSINOPHILS RELATIVE PERCENT: 2.8 % (ref 0–6)
GFR AFRICAN AMERICAN: 44
GFR NON-AFRICAN AMERICAN: 36 ML/MIN/1.73
GLUCOSE BLD-MCNC: 125 MG/DL (ref 74–99)
HCT VFR BLD CALC: 37.6 % (ref 34–48)
HEMOGLOBIN: 12 G/DL (ref 11.5–15.5)
IMMATURE GRANULOCYTES #: 0.01 E9/L
IMMATURE GRANULOCYTES %: 0.1 % (ref 0–5)
INR BLD: 1.2
LYMPHOCYTES ABSOLUTE: 1.51 E9/L (ref 1.5–4)
LYMPHOCYTES RELATIVE PERCENT: 22.2 % (ref 20–42)
MCH RBC QN AUTO: 28.1 PG (ref 26–35)
MCHC RBC AUTO-ENTMCNC: 31.9 % (ref 32–34.5)
MCV RBC AUTO: 88.1 FL (ref 80–99.9)
MONOCYTES ABSOLUTE: 0.53 E9/L (ref 0.1–0.95)
MONOCYTES RELATIVE PERCENT: 7.8 % (ref 2–12)
NEUTROPHILS ABSOLUTE: 4.54 E9/L (ref 1.8–7.3)
NEUTROPHILS RELATIVE PERCENT: 66.8 % (ref 43–80)
PDW BLD-RTO: 14.4 FL (ref 11.5–15)
PLATELET # BLD: 259 E9/L (ref 130–450)
PMV BLD AUTO: 9.6 FL (ref 7–12)
POTASSIUM REFLEX MAGNESIUM: 4.2 MMOL/L (ref 3.5–5)
PROTHROMBIN TIME: 13.2 SEC (ref 9.3–12.4)
RBC # BLD: 4.27 E12/L (ref 3.5–5.5)
SODIUM BLD-SCNC: 144 MMOL/L (ref 132–146)
TOTAL PROTEIN: 7.8 G/DL (ref 6.4–8.3)
TROPONIN: <0.01 NG/ML (ref 0–0.03)
TROPONIN: <0.01 NG/ML (ref 0–0.03)
WBC # BLD: 6.8 E9/L (ref 4.5–11.5)

## 2021-04-28 PROCEDURE — 84484 ASSAY OF TROPONIN QUANT: CPT

## 2021-04-28 PROCEDURE — 6360000002 HC RX W HCPCS: Performed by: EMERGENCY MEDICINE

## 2021-04-28 PROCEDURE — 96374 THER/PROPH/DIAG INJ IV PUSH: CPT

## 2021-04-28 PROCEDURE — 74174 CTA ABD&PLVS W/CONTRAST: CPT

## 2021-04-28 PROCEDURE — 6360000004 HC RX CONTRAST MEDICATION: Performed by: RADIOLOGY

## 2021-04-28 PROCEDURE — 96375 TX/PRO/DX INJ NEW DRUG ADDON: CPT

## 2021-04-28 PROCEDURE — 71275 CT ANGIOGRAPHY CHEST: CPT

## 2021-04-28 PROCEDURE — 85025 COMPLETE CBC W/AUTO DIFF WBC: CPT

## 2021-04-28 PROCEDURE — 36415 COLL VENOUS BLD VENIPUNCTURE: CPT

## 2021-04-28 PROCEDURE — 99285 EMERGENCY DEPT VISIT HI MDM: CPT

## 2021-04-28 PROCEDURE — 80053 COMPREHEN METABOLIC PANEL: CPT

## 2021-04-28 PROCEDURE — 85610 PROTHROMBIN TIME: CPT

## 2021-04-28 PROCEDURE — 85730 THROMBOPLASTIN TIME PARTIAL: CPT

## 2021-04-28 PROCEDURE — 93005 ELECTROCARDIOGRAM TRACING: CPT | Performed by: EMERGENCY MEDICINE

## 2021-04-28 RX ORDER — FENTANYL CITRATE 50 UG/ML
25 INJECTION, SOLUTION INTRAMUSCULAR; INTRAVENOUS ONCE
Status: COMPLETED | OUTPATIENT
Start: 2021-04-28 | End: 2021-04-28

## 2021-04-28 RX ORDER — DEXAMETHASONE SODIUM PHOSPHATE 10 MG/ML
10 INJECTION, SOLUTION INTRAMUSCULAR; INTRAVENOUS ONCE
Status: COMPLETED | OUTPATIENT
Start: 2021-04-28 | End: 2021-04-28

## 2021-04-28 RX ADMIN — DEXAMETHASONE SODIUM PHOSPHATE 10 MG: 10 INJECTION, SOLUTION INTRAMUSCULAR; INTRAVENOUS at 22:22

## 2021-04-28 RX ADMIN — IOPAMIDOL 100 ML: 755 INJECTION, SOLUTION INTRAVENOUS at 20:43

## 2021-04-28 RX ADMIN — FENTANYL CITRATE 25 MCG: 50 INJECTION, SOLUTION INTRAMUSCULAR; INTRAVENOUS at 19:56

## 2021-04-28 ASSESSMENT — PAIN DESCRIPTION - PAIN TYPE
TYPE: ACUTE PAIN
TYPE: ACUTE PAIN

## 2021-04-28 ASSESSMENT — PAIN DESCRIPTION - PROGRESSION: CLINICAL_PROGRESSION: NOT CHANGED

## 2021-04-28 ASSESSMENT — PAIN DESCRIPTION - ONSET: ONSET: ON-GOING

## 2021-04-28 ASSESSMENT — ENCOUNTER SYMPTOMS
VOMITING: 0
NAUSEA: 0
ABDOMINAL PAIN: 0
EYE REDNESS: 0
SHORTNESS OF BREATH: 0

## 2021-04-28 ASSESSMENT — PAIN SCALES - GENERAL
PAINLEVEL_OUTOF10: 10
PAINLEVEL_OUTOF10: 5

## 2021-04-28 ASSESSMENT — PAIN DESCRIPTION - LOCATION
LOCATION: CHEST
LOCATION: CHEST

## 2021-04-28 ASSESSMENT — PAIN DESCRIPTION - FREQUENCY: FREQUENCY: CONTINUOUS

## 2021-04-29 ENCOUNTER — CARE COORDINATION (OUTPATIENT)
Dept: CARE COORDINATION | Age: 81
End: 2021-04-29

## 2021-04-29 LAB
EKG ATRIAL RATE: 93 BPM
EKG P AXIS: 49 DEGREES
EKG P-R INTERVAL: 166 MS
EKG Q-T INTERVAL: 358 MS
EKG QRS DURATION: 76 MS
EKG QTC CALCULATION (BAZETT): 445 MS
EKG R AXIS: -7 DEGREES
EKG T AXIS: 26 DEGREES
EKG VENTRICULAR RATE: 93 BPM

## 2021-04-29 PROCEDURE — 93010 ELECTROCARDIOGRAM REPORT: CPT | Performed by: INTERNAL MEDICINE

## 2021-04-29 NOTE — ED NOTES
Instructions provided and questions answered. Iv disconnected, site wnl.        Charity Montero RN  04/28/21 [id number]

## 2021-04-29 NOTE — CARE COORDINATION
-ACM attempted to reach patient to follow up on recent ED visit on 4- for post ED COVID-19 Monitoring, however no answer. -HIPAA compliant VM left with ACM's contact information, requesting call back. Plan  -If no return call today, ACM will resolve COVID 19 Monitoring Episode due to Pt's ED visit was not COVID related and ACM was unable to contact patient. However, AC will continue to outreach Pt to offer Care Coordination enrollment.

## 2021-04-29 NOTE — ED PROVIDER NOTES
Chief complaint: Chest pain      HPI:  4/28/21, Time: 10:33 PM EDT    HPI               Rachel Billings is a 80 y.o. female presenting to the ED for chest pain. History is obtained from patient as well as the patient's medical record. Patient is presenting to the emergency department the chief complaint of chest pain. The patient states the pain began approximately 3 hours prior to arrival.  The pain is located on the left lateral side of the chest.  The pain is described as sharp and \"catching. \"The patient states that the pain is only present during inspiration. During inspiration is rated 5 out of 10. The pain does somewhat radiate to her back. She has not tried any treatments at home for the pain. There is no pain present with the patient is not taking a deep breath in. Patient never had any similar pain in the past.  She is concerned because she does have a history of a pulmonary embolus. She is on Eliquis. She has been taking her Eliquis. The patient does have a history of hyperlipidemia as well as hypertension. She is not diabetic. She is not a smoker. ROS:   Review of Systems   Constitutional: Negative for chills and fatigue. HENT: Negative for congestion. Eyes: Negative for redness. Respiratory: Negative for shortness of breath. Cardiovascular: Positive for chest pain. Gastrointestinal: Negative for abdominal pain, nausea and vomiting. Genitourinary: Negative for dysuria. Musculoskeletal: Negative for arthralgias. Skin: Negative for rash. Neurological: Negative for light-headedness. Psychiatric/Behavioral: Negative for confusion.    All other systems reviewed and are negative.      --------------------------------------------- PAST HISTORY ---------------------------------------------  Past Medical History:  has a past medical history of Acute saddle pulmonary embolism with acute cor pulmonale (HCC), Arthritis, Cataracts, bilateral, Cystitis, Depression, DVT of lower limb, acute ([facility] [address] Glaucoma, Hernia, HIGH CHOLESTEROL, Hypertension, Kidney stones, Sinus infection, and Superficial phlebitis of leg. Past Surgical History:  has a past surgical history that includes Bladder stone removal; Appendectomy; hernia repair; Dilation and curettage of uterus; Total knee arthroplasty; total nephrectomy; and Colonoscopy. Social History:  reports that she has never smoked. She has never used smokeless tobacco. She reports that she does not drink alcohol or use drugs. Family History: family history includes Other in her mother; Stroke in her mother. The patients home medications have been reviewed. Allergies: Penicillins    ---------------------------------------------------PHYSICAL EXAM--------------------------------------    Constitutional/General: Alert and oriented x3, well appearing, non toxic in NAD  Head: Normocephalic and atraumatic  Mouth: Oropharynx clear, handling secretions, no trismus  Neck: Supple, full ROM,  Pulmonary: Lungs clear to auscultation bilaterally, no wheezes, rales, or rhonchi. Not in respiratory distress  Cardiovascular:  Regular rate. Regular rhythm. No murmurs  Chest: There is left lateral chest wall tenderness, no overlying rashes  Abdomen: Soft. Non tender. Non distended. No rebound, guarding, or rigidity. No pulsatile masses appreciated. Musculoskeletal: Moves all extremities x 4. Warm and well perfused, no clubbing, cyanosis, or edema. Capillary refill <3 seconds  Skin: warm and dry. No rashes. Neurologic: GCS 15, no gross focal neurologic deficits  Psych: Normal Affect    -------------------------------------------------- RESULTS -------------------------------------------------  I have personally reviewed all laboratory and imaging results for this patient. Results are listed below.      LABS:  Results for orders placed or performed during the hospital encounter of 04/28/21   CBC Auto Differential   Result Value Ref Range WBC 6.8 4.5 - 11.5 E9/L    RBC 4.27 3.50 - 5.50 E12/L    Hemoglobin 12.0 11.5 - 15.5 g/dL    Hematocrit 37.6 34.0 - 48.0 %    MCV 88.1 80.0 - 99.9 fL    MCH 28.1 26.0 - 35.0 pg    MCHC 31.9 (L) 32.0 - 34.5 %    RDW 14.4 11.5 - 15.0 fL    Platelets 984 298 - 775 E9/L    MPV 9.6 7.0 - 12.0 fL    Neutrophils % 66.8 43.0 - 80.0 %    Immature Granulocytes % 0.1 0.0 - 5.0 %    Lymphocytes % 22.2 20.0 - 42.0 %    Monocytes % 7.8 2.0 - 12.0 %    Eosinophils % 2.8 0.0 - 6.0 %    Basophils % 0.3 0.0 - 2.0 %    Neutrophils Absolute 4.54 1.80 - 7.30 E9/L    Immature Granulocytes # 0.01 E9/L    Lymphocytes Absolute 1.51 1.50 - 4.00 E9/L    Monocytes Absolute 0.53 0.10 - 0.95 E9/L    Eosinophils Absolute 0.19 0.05 - 0.50 E9/L    Basophils Absolute 0.02 0.00 - 0.20 E9/L   Comprehensive Metabolic Panel w/ Reflex to MG   Result Value Ref Range    Sodium 144 132 - 146 mmol/L    Potassium reflex Magnesium 4.2 3.5 - 5.0 mmol/L    Chloride 106 98 - 107 mmol/L    CO2 28 22 - 29 mmol/L    Anion Gap 10 7 - 16 mmol/L    Glucose 125 (H) 74 - 99 mg/dL    BUN 22 6 - 23 mg/dL    CREATININE 1.4 (H) 0.5 - 1.0 mg/dL    GFR Non-African American 36 >=60 mL/min/1.73    GFR African American 44     Calcium 10.6 (H) 8.6 - 10.2 mg/dL    Total Protein 7.8 6.4 - 8.3 g/dL    Albumin 4.0 3.5 - 5.2 g/dL    Total Bilirubin 0.3 0.0 - 1.2 mg/dL    Alkaline Phosphatase 36 35 - 104 U/L    ALT 8 0 - 32 U/L    AST 16 0 - 31 U/L   Troponin   Result Value Ref Range    Troponin <0.01 0.00 - 0.03 ng/mL   Protime-INR   Result Value Ref Range    Protime 13.2 (H) 9.3 - 12.4 sec    INR 1.2    APTT   Result Value Ref Range    aPTT 30.9 24.5 - 35.1 sec   Troponin   Result Value Ref Range    Troponin <0.01 0.00 - 0.03 ng/mL   EKG 12 Lead   Result Value Ref Range    Ventricular Rate 93 BPM    Atrial Rate 93 BPM    P-R Interval 166 ms    QRS Duration 76 ms    Q-T Interval 358 ms    QTc Calculation (Bazett) 445 ms    P Axis 49 degrees    R Axis -7 degrees    T Axis 26 degrees RADIOLOGY:  Interpreted by Radiologist.  [facility] [address]   Final Result   No evidence of thoracic or abdominal aortic aneurysm or dissection. Coronary atherosclerotic calcifications. Small left pleural effusion. Splenomegaly. Nonspecific retroperitoneal nodes. Status post right nephrectomy with focal fat necrosis in the right   retroperitoneum. Moderate left hydronephrosis with stones in the inferior pole of the left   kidney. Left ureteral stent in place with mild left hydroureter. No   evidence of left ureteral calculi. Scattered colonic diverticula with no evidence of diverticulitis. CTA CHEST W CONTRAST   Final Result   No evidence of thoracic or abdominal aortic aneurysm or dissection. Coronary atherosclerotic calcifications. Small left pleural effusion. Splenomegaly. Nonspecific retroperitoneal nodes. Status post right nephrectomy with focal fat necrosis in the right   retroperitoneum. Moderate left hydronephrosis with stones in the inferior pole of the left   kidney. Left ureteral stent in place with mild left hydroureter. No   evidence of left ureteral calculi. Scattered colonic diverticula with no evidence of diverticulitis. EKG:  This EKG is signed and interpreted by me. Normal sinus rhythm, rate of 93, no ST segment elevation or depression, WA interval 166 MS, QRS 76 MS,  MS   interpreted by me      ------------------------- NURSING NOTES AND VITALS REVIEWED ---------------------------   The nursing notes within the ED encounter and vital signs as below have been reviewed by myself. BP (!) 143/77   Pulse 88   Temp 97.9 °F (36.6 °C) (Temporal)   Resp 16   Ht (!) 10\" (0.254 m)   Wt 190 lb (86.2 kg)   SpO2 95%   BMI 1335.85 kg/m²   Oxygen Saturation Interpretation: Normal    The patients available past medical records and past encounters were reviewed. ------------------------------ ED COURSE/MEDICAL DECISION MAKING----------------------  Medications   fentaNYL (SUBLIMAZE) injection 25 mcg (25 mcg Intravenous Given 4/28/21 1956)   iopamidol (ISOVUE-370) 76 % injection 100 mL (100 mLs Intravenous Given 4/28/21 2043)   dexamethasone (PF) (DECADRON) injection 10 mg (10 mg Intravenous Given 4/28/21 2222)             Medical Decision Making:   I, Dr. Caitie Jones am the primary physician of record. The patient is a 53-year-old female presenting to emergency department the chief complaint of chest pain. The pain is worse with inspiration. The pain is not present at rest when she is not taking a deep breath in. The patient did have labs and imaging which were reviewed. EKG with no ischemic changes, the patient did have troponin which was negative x2. The patient was CTA chest did demonstrate coronary artery calcifications. Did discuss the findings and the coronary artery calcifications with the patient. Admission was offered. Patient declined. In shared decision-making model a delta troponin was performed as I do not believe this is cardiac as the pain is reproducible on examination only present during deep inspiration and described multiple times as a \"catching\" sensation. The patient was instructed that she must follow-up with cardiology. The patient was instructed to return to the emergency department for worsening or change in symptoms. The patient was given steroids in the emergency department for suspected pleurisy. Re-Evaluations/Consultations:             ED Course as of Apr 29 1834 Wed Apr 28, 2021 2206 The patient is sitting in the bed in no acute distress. Results of today were discussed with the patient as well as the daughter at bedside. The patient states that her pain is only present on inspiration. I do not believe this is cardiac in nature. They were updated on the cardiac calcification seen on her CTA.   They are instructed that she must follow-up with a cardiologist.  They were offered admission and in shared decision-making model the patient states that she wishes for discharge home. [MT]      ED Course User Index  [MT] Irene Domínguez DO               This patient's ED course included: History, physical examination, reevaluation prior to disposition, labs, imaging, telemetry monitoring, EKG       This patient has remained hemodynamically stable during their ED course. Counseling: The emergency provider has spoken with the patient and discussed todays results, in addition to providing specific details for the plan of care and counseling regarding the diagnosis and prognosis. Questions are answered at this time and they are agreeable with the plan.       --------------------------------- IMPRESSION AND DISPOSITION ---------------------------------    IMPRESSION  1. Atypical chest pain        DISPOSITION  Disposition: Discharge to home  Patient condition is stable        NOTE: This report was transcribed using voice recognition software.  Every effort was made to ensure accuracy; however, inadvertent computerized transcription errors may be present         Irene Domínguez DO  04/29/21 3187

## 2021-04-29 NOTE — CARE COORDINATION
Pt returned call to [facility]. Patient contacted regarding recent discharge and COVID-19 risk. Discussed COVID-19 related testing which was not done at this time. Test results were not done. Patient informed of results, if available? N/A     Care Transition Nurse/ Ambulatory Care Manager contacted the patient by telephone to perform post discharge assessment. Verified name and  with patient as identifiers.     -Pt reports feeling about the same. Chest pain only with deep inspiration and radiates to her back. Pain is 3-5/10. She has not taken any Tylenol yet today. No fever, cough, SOB or diaphoresis. She has not taken her temperature yet today.   -Pt reports keeping hydrated and appetite is a little low. Pt said she has lost 19 lbs by just eating less. She said she is over weight and needs to lose. -No ED follow up appt scheduled. Offered assistance, Pt declined. Pt said her sister, Neris Katz makes all her appts because Pt doesn't drive anymore. Pt's sister takes her to appts. Neris Katz makes appts according to Melvi's work schedule. Pt said Neris Katz is going to make an ED follow up appt with PCP and a Cardiologist.    -Reviewed decision maker.  -Offered MyChart, Pt declined. -Offered LOOP, Pt declined. -Offered Care Coordination, Pt declined. Patient has following risk factors of: HTN, DVT, HLD, Pulm Embolism. CTN/ACM reviewed discharge instructions, medical action plan and red flags related to discharge diagnosis. Reviewed and educated them on any new and changed medications related to discharge diagnosis. Advised obtaining a 90-day supply of all daily and as-needed medications. Education provided regarding infection prevention, and signs and symptoms of COVID-19 and when to seek medical attention with patient who verbalized understanding.  Discussed exposure protocols and quarantine from [address] you at higher risk for severe illness  and given an opportunity for questions and concerns. The patient agrees to contact the COVID-19 hotline [phone] or PCP office for questions related to their healthcare. CTN/ACM provided contact information for future reference. From CDC: Are you at higher risk for severe illness?  Wash your hands often.  Avoid close contact (6 feet, which is about two arm lengths) with people who are sick.  Put distance between yourself and other people if COVID-19 is spreading in your community.  Clean and disinfect frequently touched surfaces.  Avoid all cruise travel and non-essential air travel.  Call your healthcare professional if you have concerns about COVID-19 and your underlying condition or if you are sick. For more information on steps you can take to protect yourself, see CDC's How to [address] [place] [address] for follow-up call in 7-14 days based on severity of symptoms and risk factors.

## 2021-05-10 ENCOUNTER — IMMUNIZATION (OUTPATIENT)
Dept: PRIMARY CARE CLINIC | Age: 81
End: 2021-05-10
Payer: MEDICARE

## 2021-05-10 PROCEDURE — 0002A COVID-19, PFIZER VACCINE 30MCG/0.3ML DOSE: CPT

## 2021-05-10 PROCEDURE — 91300 COVID-19, PFIZER VACCINE 30MCG/0.3ML DOSE: CPT

## 2021-05-10 RX ORDER — DULOXETIN HYDROCHLORIDE 60 MG/1
60 CAPSULE, DELAYED RELEASE ORAL DAILY
Qty: 30 CAPSULE | Refills: 5 | Status: SHIPPED
Start: 2021-05-10 | End: 2021-11-01

## 2021-05-11 ENCOUNTER — TELEPHONE (OUTPATIENT)
Dept: ADMINISTRATIVE | Age: 81
End: 2021-05-11

## 2021-05-11 NOTE — TELEPHONE ENCOUNTER
Please call sister Jose Bruno, patient needs to have kidney stone procedure 08/05- information being sent to Dr Avis Stringer from [facility]. They need some pre testing done, blood,urine covid. I scheduled her for 07/22 (per her request)  but she is confused about when/where for some tests. Please call her and advise.   Thanks

## 2021-05-12 ENCOUNTER — CARE COORDINATION (OUTPATIENT)
Dept: CARE COORDINATION | Age: 81
End: 2021-05-12

## 2021-05-12 NOTE — CARE COORDINATION
-ACM attempted to reach patient for 14 day follow up on recent ED visit for post ED COVID-19 Monitoring, however no answer. -HIPAA compliant VM left introducing self and left ACM's contact information, requesting a return call. -ACM will sign off and resolve COVID 19 monitoring episode due to unable to reach if no return call today.

## 2021-05-13 ENCOUNTER — CARE COORDINATION (OUTPATIENT)
Dept: CARE COORDINATION | Age: 81
End: 2021-05-13

## 2021-05-13 NOTE — CARE COORDINATION
-ACM received voice message from patient requesting a call back. You Patient resolved from the Care Transitions episode on 5-. Discussed COVID-19 related testing which was not done at this time. Test results were not done. Patient informed of results, if available? N/A    Patient/family has been provided the following resources and education related to COVID-19:                         Signs, symptoms and red flags related to COVID-19            CDC exposure and quarantine guidelines            [employer] exposure contact - [phone]            Contact for their local Department of Health                 Patient currently reports that the following symptoms have improved:  -Pt reports chest pain is gone. No fever, cough, SOB or diaphoresis. -Pt reports she had both Covid vaccines. -PCP appt 7-.  -Pt in agreement with ACM signing off. No further outreach scheduled with this CTN/ACM. Episode of Care resolved. Patient has this CTN/ACM contact information if future needs arise.

## 2021-07-22 ENCOUNTER — OFFICE VISIT (OUTPATIENT)
Dept: PRIMARY CARE CLINIC | Age: 81
End: 2021-07-22
Payer: MEDICARE

## 2021-07-22 VITALS
SYSTOLIC BLOOD PRESSURE: 108 MMHG | HEART RATE: 106 BPM | BODY MASS INDEX: 1321.78 KG/M2 | OXYGEN SATURATION: 96 % | TEMPERATURE: 98.1 F | DIASTOLIC BLOOD PRESSURE: 84 MMHG | WEIGHT: 188 LBS

## 2021-07-22 DIAGNOSIS — R73.01 IMPAIRED FASTING GLUCOSE: ICD-10-CM

## 2021-07-22 DIAGNOSIS — I10 HYPERTENSION, ESSENTIAL, BENIGN: Chronic | ICD-10-CM

## 2021-07-22 DIAGNOSIS — E78.5 HYPERLIPIDEMIA, UNSPECIFIED HYPERLIPIDEMIA TYPE: Chronic | ICD-10-CM

## 2021-07-22 DIAGNOSIS — Z01.818 PRE-OP EVALUATION: Primary | ICD-10-CM

## 2021-07-22 DIAGNOSIS — Z86.718 HISTORY OF DVT (DEEP VEIN THROMBOSIS): ICD-10-CM

## 2021-07-22 DIAGNOSIS — N20.0 KIDNEY STONE: ICD-10-CM

## 2021-07-22 PROCEDURE — 99214 OFFICE O/P EST MOD 30 MIN: CPT | Performed by: FAMILY MEDICINE

## 2021-07-22 RX ORDER — SOLIFENACIN SUCCINATE 5 MG/1
5 TABLET, FILM COATED ORAL DAILY
COMMUNITY

## 2021-07-22 ASSESSMENT — ENCOUNTER SYMPTOMS
RESPIRATORY NEGATIVE: 1
ALLERGIC/IMMUNOLOGIC NEGATIVE: 1
EYES NEGATIVE: 1
GASTROINTESTINAL NEGATIVE: 1

## 2021-07-22 NOTE — PROGRESS NOTES
21     Alexa Spain    : 1940 Sex: female   Age: 80 y.o. Chief Complaint   Patient presents with    Pre-op Exam     having other part of her kidney stone removed on . Already had clearance 3 months ago just needs updated forms and note       Prior to Admission medications    Medication Sig Start Date End Date Taking? Authorizing Provider   solifenacin (VESICARE) 5 MG tablet Take 10 mg by mouth daily   Yes Historical Provider, MD   DULoxetine (CYMBALTA) 60 MG extended release capsule Take 1 capsule by mouth daily 5/10/21  Yes Karyn Guillory DO   simvastatin (ZOCOR) 40 MG tablet Take 1 tablet by mouth nightly 21  Yes Walter Wilderper, DO   Multiple Vitamins-Minerals (THERAPEUTIC MULTIVITAMIN-MINERALS) tablet Take 1 tablet by mouth daily   Yes Historical Provider, MD   apixaban (ELIQUIS) 5 MG TABS tablet Take 5 mg by mouth 2 times daily   Yes Historical Provider, MD   metoprolol tartrate (LOPRESSOR) 25 MG tablet Take 1 tablet by mouth 2 times daily 20  Yes Adiel Greenfield MD   Elastic Bandages & Supports (JOBST KNEE HIGH COMPRESSION SM) MISC Dx: Varicose veins of the legs with pain and swelling. Bilateral knee-high 8-15 mm Hg compression stockings. 20  Yes MANJIT Quan CNP   Handicap Placard MISC by Does not apply route Duration 5 years 20  Yes Karyn Guillory DO   docusate sodium (COLACE) 100 MG capsule Take 100 mg by mouth daily as needed for Constipation  11  Yes Historical Provider, MD          HPI: Patient is evaluated today for preop for kidney stone. Medically has remained stable. Medical history of hypertensive disease impaired fasting glucose kidney stones hyperlipidemia all of which have remained stable. Medical history of DVT and remains on Eliquis and controlled. Today for preop evaluation requesting lab studies. EKG urine. EKG completed in April was stable do not feel strong need for repeat at this time. Medications all reviewed continue as prescribed. Baseline labs today. UA to be completed. Covid testing and nasal culture 2 August.          Review of Systems   Constitutional: Negative. HENT: Negative. Eyes: Negative. Respiratory: Negative. Gastrointestinal: Negative. Endocrine: Negative. Genitourinary: Negative. Musculoskeletal: Negative. Skin: Negative. Allergic/Immunologic: Negative. Neurological: Negative. Hematological: Negative. Psychiatric/Behavioral: Negative. Today system review is stable aside from current problems kidney stones. Current Outpatient Medications:     solifenacin (VESICARE) 5 MG tablet, Take 10 mg by mouth daily, Disp: , Rfl:     DULoxetine (CYMBALTA) 60 MG extended release capsule, Take 1 capsule by mouth daily, Disp: 30 capsule, Rfl: 5    simvastatin (ZOCOR) 40 MG tablet, Take 1 tablet by mouth nightly, Disp: 90 tablet, Rfl: 1    Multiple Vitamins-Minerals (THERAPEUTIC MULTIVITAMIN-MINERALS) tablet, Take 1 tablet by mouth daily, Disp: , Rfl:     apixaban (ELIQUIS) 5 MG TABS tablet, Take 5 mg by mouth 2 times daily, Disp: , Rfl:     metoprolol tartrate (LOPRESSOR) 25 MG tablet, Take 1 tablet by mouth 2 times daily, Disp: 90 tablet, Rfl: 3    Elastic Bandages & Supports (JOBST KNEE HIGH COMPRESSION SM) MISC, Dx: Varicose veins of the legs with pain and swelling. Bilateral knee-high 8-15 mm Hg compression stockings. , Disp: 2 each, Rfl: 2    Handicap Placard MISC, by Does not apply route Duration 5 years, Disp: 1 each, Rfl: 0    docusate sodium (COLACE) 100 MG capsule, Take 100 mg by mouth daily as needed for Constipation , Disp: , Rfl:     Allergies   Allergen Reactions    Penicillins        Social History     Tobacco Use    Smoking status: Never Smoker    Smokeless tobacco: Never Used   Vaping Use    Vaping Use: Never used   Substance Use Topics    Alcohol use: Never    Drug use: Never      Past Surgical History: Procedure Laterality Date    APPENDECTOMY      BLADDER STONE REMOVAL      COLONOSCOPY      DILATION AND CURETTAGE OF UTERUS      HERNIA REPAIR      TOTAL KNEE ARTHROPLASTY      right    TOTAL NEPHRECTOMY      right     Family History   Problem Relation Age of Onset    Stroke Mother     Other Mother         phlebitis     Past Medical History:   Diagnosis Date    Acute saddle pulmonary embolism with acute cor pulmonale ([address] 12/16/2020    Arthritis     Cataracts, bilateral     Cystitis     Depression     DVT of lower limb, acute ([address] 03/15/2013    Glaucoma     s/p stent placement    Hernia     HIGH CHOLESTEROL     Hypertension     Kidney stones     Sinus infection     Superficial phlebitis of leg 10/30/2014       Vitals:    07/22/21 1616   BP: 108/84   Pulse: 106   Temp: 98.1 °F (36.7 °C)   SpO2: 96%   Weight: 188 lb (85.3 kg)     BP Readings from Last 3 Encounters:   07/22/21 108/84   04/28/21 (!) 143/77   02/19/21 120/82        Physical Exam  Vitals and nursing note reviewed. Constitutional:       Appearance: She is well-developed. HENT:      Head: Normocephalic. Right Ear: External ear normal.      Left Ear: External ear normal.      Nose: Nose normal.   Eyes:      Conjunctiva/sclera: Conjunctivae normal.      Pupils: Pupils are equal, round, and reactive to light. Cardiovascular:      Rate and Rhythm: Normal rate. Pulmonary:      Breath sounds: Normal breath sounds. Abdominal:      General: Bowel sounds are normal.      Palpations: Abdomen is soft. Musculoskeletal:         General: Normal range of motion. Cervical back: Normal range of motion and neck supple. Skin:     General: Skin is warm and dry. Neurological:      Mental Status: She is alert and oriented to person, place, and time. Psychiatric:         Behavior: Behavior normal.     Present vitals physical examination stable. Medications as prescribed. Low risk for recommended surgery.   Blood work to be completed we will follow-up. Plans for intervention in August and then will see me shortly thereafter. Plan Per Assessment:  Amandeep Beth was seen today for pre-op exam.    Diagnoses and all orders for this visit:    Pre-op evaluation  -     Culture, Urine; Future  -     CBC Auto Differential; Future  -     Comprehensive Metabolic Panel; Future  -     PROTIME-INR; Future  -     Culture, MRSA, Screening; Future  -     COVID-19 Ambulatory; Future    Hypertension, essential, benign    Impaired fasting glucose    Kidney stone    Hyperlipidemia, unspecified hyperlipidemia type    History of DVT (deep vein thrombosis)            No follow-ups on file. Tracey Tuttle DO    Note was generated with the assistance of voice recognition software. Document was reviewed however may contain grammatical errors.

## 2021-07-26 ENCOUNTER — HOSPITAL ENCOUNTER (OUTPATIENT)
Age: 81
Discharge: HOME | End: 2021-07-26
Payer: MEDICARE

## 2021-07-26 DIAGNOSIS — Z01.818 PRE-OP EVALUATION: ICD-10-CM

## 2021-07-26 LAB
ALBUMIN SERPL-MCNC: 3.7 G/DL (ref 3.5–5.2)
ALP BLD-CCNC: 39 U/L (ref 35–104)
ALT SERPL-CCNC: 5 U/L (ref 0–32)
ANION GAP SERPL CALCULATED.3IONS-SCNC: 10 MMOL/L (ref 7–16)
AST SERPL-CCNC: 11 U/L (ref 0–31)
BASOPHILS ABSOLUTE: 0.03 E9/L (ref 0–0.2)
BASOPHILS RELATIVE PERCENT: 0.4 % (ref 0–2)
BILIRUB SERPL-MCNC: 0.4 MG/DL (ref 0–1.2)
BUN BLDV-MCNC: 33 MG/DL (ref 6–23)
CALCIUM SERPL-MCNC: 10.8 MG/DL (ref 8.6–10.2)
CHLORIDE BLD-SCNC: 103 MMOL/L (ref 98–107)
CO2: 27 MMOL/L (ref 22–29)
CREAT SERPL-MCNC: 2.1 MG/DL (ref 0.5–1)
EOSINOPHILS ABSOLUTE: 0.17 E9/L (ref 0.05–0.5)
EOSINOPHILS RELATIVE PERCENT: 2.4 % (ref 0–6)
GFR AFRICAN AMERICAN: 27
GFR NON-AFRICAN AMERICAN: 23 ML/MIN/1.73
GLUCOSE BLD-MCNC: 120 MG/DL (ref 74–99)
HCT VFR BLD CALC: 34.6 % (ref 34–48)
HEMOGLOBIN: 11 G/DL (ref 11.5–15.5)
IMMATURE GRANULOCYTES #: 0.03 E9/L
IMMATURE GRANULOCYTES %: 0.4 % (ref 0–5)
INR BLD: 1.4
LYMPHOCYTES ABSOLUTE: 1.59 E9/L (ref 1.5–4)
LYMPHOCYTES RELATIVE PERCENT: 22.2 % (ref 20–42)
MCH RBC QN AUTO: 28.8 PG (ref 26–35)
MCHC RBC AUTO-ENTMCNC: 31.8 % (ref 32–34.5)
MCV RBC AUTO: 90.6 FL (ref 80–99.9)
MONOCYTES ABSOLUTE: 0.62 E9/L (ref 0.1–0.95)
MONOCYTES RELATIVE PERCENT: 8.7 % (ref 2–12)
NEUTROPHILS ABSOLUTE: 4.72 E9/L (ref 1.8–7.3)
NEUTROPHILS RELATIVE PERCENT: 65.9 % (ref 43–80)
PDW BLD-RTO: 14.6 FL (ref 11.5–15)
PLATELET # BLD: 283 E9/L (ref 130–450)
PMV BLD AUTO: 9.2 FL (ref 7–12)
POTASSIUM SERPL-SCNC: 4.6 MMOL/L (ref 3.5–5)
PROTHROMBIN TIME: 15.1 SEC (ref 9.3–12.4)
RBC # BLD: 3.82 E12/L (ref 3.5–5.5)
SODIUM BLD-SCNC: 140 MMOL/L (ref 132–146)
TOTAL PROTEIN: 7.9 G/DL (ref 6.4–8.3)
WBC # BLD: 7.2 E9/L (ref 4.5–11.5)

## 2021-07-26 PROCEDURE — 87088 URINE BACTERIA CULTURE: CPT

## 2021-07-26 PROCEDURE — 85610 PROTHROMBIN TIME: CPT

## 2021-07-26 PROCEDURE — 80053 COMPREHEN METABOLIC PANEL: CPT

## 2021-07-26 PROCEDURE — 85025 COMPLETE CBC W/AUTO DIFF WBC: CPT

## 2021-07-26 PROCEDURE — 36415 COLL VENOUS BLD VENIPUNCTURE: CPT

## 2021-07-29 LAB — URINE CULTURE, ROUTINE: NORMAL

## 2021-08-02 DIAGNOSIS — Z01.818 PRE-OP EVALUATION: ICD-10-CM

## 2021-08-03 LAB — SARS-COV-2, PCR: NOT DETECTED

## 2021-08-05 ENCOUNTER — TELEPHONE (OUTPATIENT)
Dept: PRIMARY CARE CLINIC | Age: 81
End: 2021-08-05

## 2021-08-05 NOTE — TELEPHONE ENCOUNTER
----- Message from Napoleon Henson sent at 8/4/2021  4:45 PM EDT -----  Subject: Message to Provider    QUESTIONS  Information for Provider? patient is having surgery at the [facility] tomorrow, and she said that they called and said they have not   received the results for the test yet.  ---------------------------------------------------------------------------  --------------  CALL BACK INFO  What is the best way for the office to contact you? OK to leave message on   voicemail  Preferred Call Back Phone Number?  [phone]  ---------------------------------------------------------------------------  --------------  SCRIPT ANSWERS  undefined

## 2021-08-21 PROBLEM — Z01.818 PRE-OP EVALUATION: Status: RESOLVED | Noted: 2021-07-22 | Resolved: 2021-08-21

## 2021-09-29 RX ORDER — SIMVASTATIN 40 MG
40 TABLET ORAL NIGHTLY
Qty: 90 TABLET | Refills: 1 | Status: SHIPPED
Start: 2021-09-29 | End: 2022-04-08 | Stop reason: SDUPTHER

## 2021-11-01 RX ORDER — DULOXETIN HYDROCHLORIDE 60 MG/1
CAPSULE, DELAYED RELEASE ORAL
Qty: 30 CAPSULE | Refills: 0 | Status: SHIPPED
Start: 2021-11-01 | End: 2022-01-13 | Stop reason: SDUPTHER

## 2021-11-26 ENCOUNTER — HOSPITAL ENCOUNTER (OUTPATIENT)
Age: 81
Discharge: HOME | End: 2021-11-28
Payer: MEDICARE

## 2021-11-26 ENCOUNTER — HOSPITAL ENCOUNTER (OUTPATIENT)
Dept: GENERAL RADIOLOGY | Age: 81
Discharge: HOME | End: 2021-11-28
Payer: MEDICARE

## 2021-11-26 ENCOUNTER — HOSPITAL ENCOUNTER (OUTPATIENT)
Age: 81
Discharge: HOME | End: 2021-11-26
Payer: MEDICARE

## 2021-11-26 DIAGNOSIS — N20.0 CALCULUS OF KIDNEY: ICD-10-CM

## 2021-11-26 LAB
CALCIUM SERPL-MCNC: 11 MG/DL (ref 8.6–10.2)
PHOSPHORUS: 3.9 MG/DL (ref 2.5–4.5)
URIC ACID, SERUM: 6.7 MG/DL (ref 2.4–5.7)

## 2021-11-26 PROCEDURE — 74018 RADEX ABDOMEN 1 VIEW: CPT

## 2021-11-26 PROCEDURE — 84100 ASSAY OF PHOSPHORUS: CPT

## 2021-11-26 PROCEDURE — 36415 COLL VENOUS BLD VENIPUNCTURE: CPT

## 2021-11-26 PROCEDURE — 82310 ASSAY OF CALCIUM: CPT

## 2021-11-26 PROCEDURE — 84550 ASSAY OF BLOOD/URIC ACID: CPT

## 2021-12-23 NOTE — ED NOTES
Called access line     Roberto Couch RN  12/15/20 0507
Dr Michael Lizarraga returned call     Leyla Jimenez RN  12/15/20 5449
PT placed on cardiac monitor      [facility]  12/15/20 4121
Placed in will call at this time     Amber Gonzalez, [address] [place] [address]  12/15/20 0402
Pt does not want the zofran at this time. No longer nauseated, states it only happened briefly after she had come back from the restroom.       Ruthann Cowart RN  12/15/20 8836
Pt had been up to restroom, became dizzy, SOB and nauseated on her way back to her room. Dr. Keith Espinosa notified.        Shant Fontenot RN  12/15/20 7821
Pt resting in bed, no respiratory distress. Waiting for bed assignment for admission.       Fatimah Rolle RN  12/15/20 1600
Pt sleeping, no distress. Waiting for covid test results then bed assignment for admission.       Azra Phillips RN  12/15/20 5096
no blurred vision

## 2022-01-06 ENCOUNTER — TELEPHONE (OUTPATIENT)
Dept: VASCULAR SURGERY | Age: 82
End: 2022-01-06

## 2022-01-06 DIAGNOSIS — M79.89 RIGHT LEG SWELLING: Primary | ICD-10-CM

## 2022-01-06 NOTE — TELEPHONE ENCOUNTER
Spoke to Pts Sister, Gregory Wolff, on her R le venous testing for 1- with Dr Lino Manjarrez. Moved appt to 3-. She will set up testing at [facility].

## 2022-01-13 RX ORDER — DULOXETIN HYDROCHLORIDE 60 MG/1
CAPSULE, DELAYED RELEASE ORAL
Qty: 90 CAPSULE | Refills: 1 | Status: SHIPPED
Start: 2022-01-13 | End: 2022-08-11 | Stop reason: SDUPTHER

## 2022-02-16 ENCOUNTER — HOSPITAL ENCOUNTER (OUTPATIENT)
Dept: ULTRASOUND IMAGING | Age: 82
Discharge: HOME | End: 2022-02-18
Payer: MEDICARE

## 2022-02-16 DIAGNOSIS — M79.89 RIGHT LEG SWELLING: ICD-10-CM

## 2022-02-16 PROCEDURE — 93971 EXTREMITY STUDY: CPT

## 2022-02-18 NOTE — PROGRESS NOTES
R LE venous us  · Echogenic thrombus from right femoral vein to calf  · Some flow in the femoral vein in popliteal   · No flow in calf veins    Previous venous us 12/2020  R LE Venous us  · Occlusive thrombus from femoral vein to calf  · No significant flow noted    Interval improvement as compared to previous    Will discuss with patient at fu visit 3/14/22    She had previous PE lysis    She is supposed to fu with pulmonary in regards to whether she needs lifelong anticoagulation therapy    Estephanie Hawkins MD

## 2022-03-11 ENCOUNTER — TELEPHONE (OUTPATIENT)
Dept: VASCULAR SURGERY | Age: 82
End: 2022-03-11

## 2022-03-25 ENCOUNTER — TELEPHONE (OUTPATIENT)
Dept: VASCULAR SURGERY | Age: 82
End: 2022-03-25

## 2022-03-28 ENCOUNTER — OFFICE VISIT (OUTPATIENT)
Dept: VASCULAR SURGERY | Age: 82
End: 2022-03-28
Payer: MEDICARE

## 2022-03-28 VITALS — WEIGHT: 187 LBS | BODY MASS INDEX: 26.77 KG/M2 | HEIGHT: 70 IN

## 2022-03-28 DIAGNOSIS — I26.02 ACUTE SADDLE PULMONARY EMBOLISM WITH ACUTE COR PULMONALE (HCC): ICD-10-CM

## 2022-03-28 DIAGNOSIS — I82.411 ACUTE DEEP VEIN THROMBOSIS (DVT) OF FEMORAL VEIN OF RIGHT LOWER EXTREMITY (HCC): Primary | ICD-10-CM

## 2022-03-28 PROCEDURE — 99213 OFFICE O/P EST LOW 20 MIN: CPT | Performed by: NURSE PRACTITIONER

## 2022-03-28 NOTE — PROGRESS NOTES
Vascular Surgery Outpatient Followup    PCP : Lorin Celaya DO    Previous Vascular Procedure  12/16/20 Catheter directed PE lysis, EKOS     HISTORY OF PRESENT ILLNESS:    This is a 80year old patient that returns for evaluation of PE and R LE DVT. She presents with her sister. She remains on eliquis and is tolerating it without any bleeding issues. She denies any chest pain, shortness or breath, or leg swelling. She had a repeat right lower extremity venous ultrasound done that showed improvement in the R LE DVT. She has a history of R LE DVT in 2005 after surgery. The most recent R LE DVT was due to period of inactivity. The patient's sister states that the patient is still very inactive overall. She walks with a walker.      Past Medical History:        Diagnosis Date    Acute saddle pulmonary embolism with acute cor pulmonale ([facility]) 12/16/2020    Arthritis     Cataracts, bilateral     Cystitis     Depression     DVT of lower limb, acute ([facility]) 03/15/2013    Glaucoma     s/p stent placement    Hernia     HIGH CHOLESTEROL     Hypertension     Kidney stones     Sinus infection     Superficial phlebitis of leg 10/30/2014     Past Surgical History:        Procedure Laterality Date    APPENDECTOMY      BLADDER STONE REMOVAL      CATARACT REMOVAL      COLONOSCOPY      DILATION AND CURETTAGE OF UTERUS      HERNIA REPAIR      TOTAL KNEE ARTHROPLASTY      right    TOTAL NEPHRECTOMY      right     Current Medications:   Current Outpatient Medications   Medication Sig Dispense Refill    apixaban (ELIQUIS) 2.5 MG TABS tablet Take 1 tablet by mouth 2 times daily 180 tablet 1    metoprolol tartrate (LOPRESSOR) 25 MG tablet 1/2 tablet twice a day 90 tablet 1    DULoxetine (CYMBALTA) 60 MG extended release capsule TAKE ONE CAPSULE BY MOUTH DAILY 90 capsule 1    simvastatin (ZOCOR) 40 MG tablet Take 1 tablet by mouth nightly 90 tablet 1    solifenacin (VESICARE) 5 MG tablet Take 10 mg by mouth daily      Multiple Vitamins-Minerals (THERAPEUTIC MULTIVITAMIN-MINERALS) tablet Take 1 tablet by mouth daily      Elastic Bandages & Supports (JOBST KNEE HIGH COMPRESSION SM) MISC Dx: Varicose veins of the legs with pain and swelling. Bilateral knee-high 8-15 mm Hg compression stockings. 2 each 2    Handicap Placard MISC by Does not apply route Duration 5 years 1 each 0    docusate sodium (COLACE) 100 MG capsule Take 100 mg by mouth daily as needed for Constipation        No current facility-administered medications for this visit. Allergies:  Penicillins  Social History     Socioeconomic History    Marital status: Single     Spouse name: Not on file    Number of children: Not on file    Years of education: Not on file    Highest education level: Not on file   Occupational History    Not on file   Tobacco Use    Smoking status: Never Smoker    Smokeless tobacco: Never Used   Vaping Use    Vaping Use: Never used   Substance and Sexual Activity    Alcohol use: Never    Drug use: Never    Sexual activity: Not on file   Other Topics Concern    Not on file   Social History Narrative    Not on file     Social Determinants of Health     Financial Resource Strain:     Difficulty of Paying Living Expenses: Not on file   Food Insecurity:     Worried About [address] [employer] in the Last Year: Not on file    Nehemias of Food in the Last Year: Not on file   Transportation Needs:     Lack of Transportation (Medical): Not on file    Lack of Transportation (Non-Medical):  Not on file   Physical Activity:     Days of Exercise per Week: Not on file    Minutes of Exercise per Session: Not on file   Stress:     Feeling of Stress : Not on file   Social Connections:     Frequency of Communication with Friends and Family: Not on file    Frequency of Social Gatherings with Friends and Family: Not on file    Attends Taoism Services: Not on file    Active Member of Clubs or Organizations: Not on file  Attends Club or Organization Meetings: Not on file    Marital Status: Not on file   Intimate Partner Violence:     Fear of Current or Ex-Partner: Not on file    Emotionally Abused: Not on file    Physically Abused: Not on file    Sexually Abused: Not on file   Housing Stability:     Unable to Pay for Housing in the Last Year: Not on file    Number of [place] in the Last Year: Not on file    Unstable Housing in the Last Year: Not on file     Family History   Problem Relation Age of Onset    Stroke Mother     Other Mother         phlebitis     Labs  Lab Results   Component Value Date    WBC 7.2 07/26/2021    HGB 11.0 (L) 07/26/2021    HCT 34.6 07/26/2021     07/26/2021    PROTIME 15.1 (H) 07/26/2021    INR 1.4 07/26/2021    APTT 30.9 04/28/2021    K 4.6 07/26/2021    BUN 33 (H) 07/26/2021    CREATININE 2.1 (H) 07/26/2021       PHYSICAL EXAM:    CONSTITUTIONAL:   Awake, alert, cooperative  PSYCHIATRIC :  Oriented to time, place and person     Appropriate insight to disease process  EYES: Lids and lashes normal  ENT:  External ears and nose without lesions   Hearing deficits present  NECK: Supple, symmetrical, trachea midline   Carotid bruit absent  LUNGS:  No increased work of breathing                 Clear to auscultation  CARDIOVASCULAR:  regular rate and rhythm   ABDOMEN:  soft, non-distended, non-tender   Aorta is not palpable  SKIN:   Normal skin color   Texture and turgor normal, no induration  EXTREMITIES:   R LE Edema slight  L LE Edema slight    RADIOLOGY:  R LE venous us  · Echogenic thrombus from right femoral vein to calf  · Some flow in the femoral vein in popliteal   · No flow in calf veins     Previous venous us 12/2020  R LE Venous us  · Occlusive thrombus from femoral vein to calf  · No significant flow noted    A/P S/P Pulmonary Embolism Lysis               Hx of R LE DVT  · pt has had improvement of their symptoms  · Ultrasound is also improved   · pt is currently on eliquis for anticoagulation and has been on for approx 14 months   · Due to continued inactive status, recommend continued low dose anticoagulation of 2.5mg daily  · This was discussed between Dr. Hayde Jensen and Dr. Neida Wang  · Script sent to patient's pharmacy  · Elevate bilateral LE while in bed or sitting  · Compression stockings knee high 8-15 mm hg wear daily - emphasized importance of daily and lifetime use  · F/U as outpatient as needed   · Call if patient develops worsening of swelling or wounds  · discussed with patient pathophysiology of DVT, PE, and thrombophlebitis   · All ?s answered     Pt seen and plan reviewed with Dr. Hayde Jensen.      Shannon Marin, APRN - CNP

## 2022-04-01 RX ORDER — SIMVASTATIN 40 MG
TABLET ORAL
Qty: 90 TABLET | Refills: 0 | OUTPATIENT
Start: 2022-04-01

## 2022-04-04 DIAGNOSIS — I10 HYPERTENSION, ESSENTIAL, BENIGN: Primary | ICD-10-CM

## 2022-04-04 DIAGNOSIS — E78.5 HYPERLIPIDEMIA, UNSPECIFIED HYPERLIPIDEMIA TYPE: ICD-10-CM

## 2022-04-04 DIAGNOSIS — R73.01 IMPAIRED FASTING GLUCOSE: ICD-10-CM

## 2022-04-04 NOTE — TELEPHONE ENCOUNTER
Spoke with care giver, scheduled appointment. Asking for labs to be placed for upcoming appointment.

## 2022-04-08 RX ORDER — SIMVASTATIN 40 MG
40 TABLET ORAL NIGHTLY
Qty: 30 TABLET | Refills: 0 | Status: SHIPPED
Start: 2022-04-08 | End: 2022-04-25 | Stop reason: SDUPTHER

## 2022-04-08 NOTE — TELEPHONE ENCOUNTER
The pt couldn't get a refill on her simvastatin because she hadn't been her in awhile and needed to make an appointment with you.  She has an appointment with you 04/18, her sister is calling to see if she can get enough medication till she comes in to see you

## 2022-04-25 ENCOUNTER — OFFICE VISIT (OUTPATIENT)
Dept: PRIMARY CARE CLINIC | Age: 82
End: 2022-04-25
Payer: MEDICARE

## 2022-04-25 VITALS
HEIGHT: 70 IN | BODY MASS INDEX: 27.63 KG/M2 | WEIGHT: 193 LBS | DIASTOLIC BLOOD PRESSURE: 70 MMHG | HEART RATE: 81 BPM | SYSTOLIC BLOOD PRESSURE: 106 MMHG | OXYGEN SATURATION: 96 % | TEMPERATURE: 98 F

## 2022-04-25 DIAGNOSIS — I82.5Y9 CHRONIC DEEP VEIN THROMBOSIS (DVT) OF PROXIMAL VEIN OF LOWER EXTREMITY, UNSPECIFIED LATERALITY (HCC): ICD-10-CM

## 2022-04-25 DIAGNOSIS — I10 HYPERTENSION, ESSENTIAL, BENIGN: Primary | Chronic | ICD-10-CM

## 2022-04-25 DIAGNOSIS — Z00.00 INITIAL MEDICARE ANNUAL WELLNESS VISIT: Primary | ICD-10-CM

## 2022-04-25 DIAGNOSIS — E78.5 HYPERLIPIDEMIA, UNSPECIFIED HYPERLIPIDEMIA TYPE: Chronic | ICD-10-CM

## 2022-04-25 DIAGNOSIS — H40.10X1 OPEN-ANGLE GLAUCOMA OF BOTH EYES, MILD STAGE, UNSPECIFIED OPEN-ANGLE GLAUCOMA TYPE: ICD-10-CM

## 2022-04-25 DIAGNOSIS — R41.89 COGNITIVE CHANGE: ICD-10-CM

## 2022-04-25 PROBLEM — I48.91 ATRIAL FIBRILLATION, CONTROLLED (HCC): Status: ACTIVE | Noted: 2022-04-25

## 2022-04-25 PROCEDURE — G0439 PPPS, SUBSEQ VISIT: HCPCS | Performed by: FAMILY MEDICINE

## 2022-04-25 PROCEDURE — 99214 OFFICE O/P EST MOD 30 MIN: CPT | Performed by: FAMILY MEDICINE

## 2022-04-25 RX ORDER — SIMVASTATIN 40 MG
40 TABLET ORAL NIGHTLY
Qty: 30 TABLET | Refills: 5 | Status: SHIPPED
Start: 2022-04-25 | End: 2022-11-03

## 2022-04-25 ASSESSMENT — PATIENT HEALTH QUESTIONNAIRE - PHQ9
2. FEELING DOWN, DEPRESSED OR HOPELESS: 0
SUM OF ALL RESPONSES TO PHQ QUESTIONS 1-9: 0
SUM OF ALL RESPONSES TO PHQ QUESTIONS 1-9: 0
SUM OF ALL RESPONSES TO PHQ9 QUESTIONS 1 & 2: 0
SUM OF ALL RESPONSES TO PHQ QUESTIONS 1-9: 0
1. LITTLE INTEREST OR PLEASURE IN DOING THINGS: 0
SUM OF ALL RESPONSES TO PHQ QUESTIONS 1-9: 0

## 2022-04-25 ASSESSMENT — LIFESTYLE VARIABLES
HOW MANY STANDARD DRINKS CONTAINING ALCOHOL DO YOU HAVE ON A TYPICAL DAY: 1 OR 2
HOW OFTEN DO YOU HAVE A DRINK CONTAINING ALCOHOL: NEVER

## 2022-04-25 NOTE — PROGRESS NOTES
22     Fanny Schlatter    : 1940 Sex: female   Age: [de-identified] y.o. Chief Complaint   Patient presents with    Atrial Fibrillation       Prior to Admission medications    Medication Sig Start Date End Date Taking? Authorizing Provider   simvastatin (ZOCOR) 40 MG tablet Take 1 tablet by mouth nightly 22  Yes Peggye Heart Traikoff, DO   apixaban (ELIQUIS) 2.5 MG TABS tablet Take 1 tablet by mouth 2 times daily 3/28/22  Yes Chin Troncoso, APRN - CNP   metoprolol tartrate (LOPRESSOR) 25 MG tablet 1/2 tablet twice a day 22  Yes Peggye Heart Traikoff, DO   DULoxetine (CYMBALTA) 60 MG extended release capsule TAKE ONE CAPSULE BY MOUTH DAILY 22  Yes Peggye Heart Traikoff, DO   solifenacin (VESICARE) 5 MG tablet Take 5 mg by mouth daily    Yes Historical Provider, MD   docusate sodium (COLACE) 100 MG capsule Take 100 mg by mouth daily as needed for Constipation  11  Yes Historical Provider, MD          HPI: Sita Alexis seen today medical follow-up on hypertension hyperlipidemia open angle glaucoma history of chronic DVT and remains on Eliquis twice a day and will continue. Some cognitive changes for her and we are going to have her further evaluated with [facility]. Review of Systems   Constitutional: Negative. HENT: Negative. Eyes: Negative. Respiratory: Negative. Gastrointestinal: Negative. Endocrine: Negative. Genitourinary: Negative. Musculoskeletal: Negative. Skin: Negative. Allergic/Immunologic: Negative. Neurological: Negative. Hematological: Negative. Psychiatric/Behavioral: Negative. Today systems review overall stable appears to be doing fairly well with present medications. Annual wellness visit evaluation today for mental status was stable. Further work-up recommended.         Current Outpatient Medications:     simvastatin (ZOCOR) 40 MG tablet, Take 1 tablet by mouth nightly, Disp: 30 tablet, Rfl: 5    apixaban (ELIQUIS) 2.5 MG TABS tablet, Take 1 tablet by mouth 2 times daily, Disp: 180 tablet, Rfl: 1    metoprolol tartrate (LOPRESSOR) 25 MG tablet, 1/2 tablet twice a day, Disp: 90 tablet, Rfl: 1    DULoxetine (CYMBALTA) 60 MG extended release capsule, TAKE ONE CAPSULE BY MOUTH DAILY, Disp: 90 capsule, Rfl: 1    solifenacin (VESICARE) 5 MG tablet, Take 5 mg by mouth daily , Disp: , Rfl:     docusate sodium (COLACE) 100 MG capsule, Take 100 mg by mouth daily as needed for Constipation , Disp: , Rfl:     Allergies   Allergen Reactions    Penicillins        Social History     Tobacco Use    Smoking status: Never Smoker    Smokeless tobacco: Never Used   Vaping Use    Vaping Use: Never used   Substance Use Topics    Alcohol use: Never    Drug use: Never      Past Surgical History:   Procedure Laterality Date    APPENDECTOMY      BLADDER STONE REMOVAL      CATARACT REMOVAL      COLONOSCOPY      DILATION AND CURETTAGE OF UTERUS      HERNIA REPAIR      TOTAL KNEE ARTHROPLASTY      right    TOTAL NEPHRECTOMY      right     Family History   Problem Relation Age of Onset    Stroke Mother     Other Mother         phlebitis     Past Medical History:   Diagnosis Date    Acute saddle pulmonary embolism with acute cor pulmonale ([address] 12/16/2020    Arthritis     Cataracts, bilateral     Cystitis     Depression     DVT of lower limb, acute ([address] 03/15/2013    Glaucoma     s/p stent placement    Hernia     HIGH CHOLESTEROL     Hypertension     Kidney stones     Sinus infection     Superficial phlebitis of leg 10/30/2014       Vitals:    04/25/22 0942   BP: 106/70   Pulse: 81   Temp: 98 °F (36.7 °C)   SpO2: 96%   Weight: 193 lb (87.5 kg)   Height: 5' 10\" (1.778 m)     BP Readings from Last 3 Encounters:   04/25/22 106/70   07/22/21 108/84   04/28/21 (!) 143/77        Physical Exam  Vitals and nursing note reviewed. Constitutional:       Appearance: She is well-developed. HENT:      Head: Normocephalic.       Right Ear: External ear normal.      Left Ear: External ear normal.      Nose: Nose normal.   Eyes:      Conjunctiva/sclera: Conjunctivae normal.      Pupils: Pupils are equal, round, and reactive to light. Cardiovascular:      Rate and Rhythm: Normal rate. Pulmonary:      Breath sounds: Normal breath sounds. Abdominal:      General: Bowel sounds are normal.      Palpations: Abdomen is soft. Musculoskeletal:         General: Normal range of motion. Cervical back: Normal range of motion and neck supple. Skin:     General: Skin is warm and dry. Neurological:      Mental Status: She is alert and oriented to person, place, and time. Psychiatric:         Behavior: Behavior normal.     Today's vitals physical examination stable. Heart is controlled lungs are clear. Medications as prescribed. Reassessment with me 1 month and further work-up at that time and review of neuropsychiatric evaluation. Plan Per Assessment:  Dimitri Del Rosario was seen today for atrial fibrillation. Diagnoses and all orders for this visit:    Hypertension, essential, benign  -     CBC with Auto Differential; Future  -     Comprehensive Metabolic Panel; Future  -     Lipid Panel; Future  -     T4; Future  -     TSH; Future    Hyperlipidemia, unspecified hyperlipidemia type  -     CBC with Auto Differential; Future  -     Comprehensive Metabolic Panel; Future  -     Lipid Panel; Future  -     T4; Future  -     TSH; Future    Open-angle glaucoma of both eyes, mild stage, unspecified open-angle glaucoma type    Cognitive change  -     External Referral To Neuropsychology  -     CBC with Auto Differential; Future  -     Comprehensive Metabolic Panel; Future  -     Lipid Panel; Future  -     T4; Future  -     TSH; Future    Chronic deep vein thrombosis (DVT) of proximal vein of lower extremity, unspecified laterality (HCC)  -     CBC with Auto Differential; Future  -     Comprehensive Metabolic Panel; Future  -     Lipid Panel;  Future  - T4; Future  -     TSH; Future    Other orders  -     simvastatin (ZOCOR) 40 MG tablet; Take 1 tablet by mouth nightly            Return in about 4 weeks (around 5/23/2022). Lianna Ortiz DO    Note was generated with the assistance of voice recognition software. Document was reviewed however may contain grammatical errors.

## 2022-04-25 NOTE — PATIENT INSTRUCTIONS
Advance Directives: Care Instructions  Overview  An advance directive is a legal way to state your wishes at the end of your life. It tells your family and your doctor what to do if you can't say what youwant. There are two main types of advance directives. You can change them any timeyour wishes change. Living will. This form tells your family and your doctor your wishes about life support and other treatment. The form is also called a declaration. Medical power of . This form lets you name a person to make treatment decisions for you when you can't speak for yourself. This person is called a health care agent (health care proxy, health care surrogate). The form is also called a durable power of  for health care. If you do not have an advance directive, decisions about your medical care maybe made by a family member, or by a doctor or a  who doesn't know you. It may help to think of an advance directive as a gift to the people who carefor you. If you have one, they won't have to make tough decisions by themselves. Follow-up care is a key part of your treatment and safety. Be sure to make and go to all appointments, and call your doctor if you are having problems. It's also a good idea to know your test results and keep alist of the medicines you take. What should you include in an advance directive? Many states have a unique advance directive form. (It may ask you to address specific issues.) Or you might use a universal form that's approved by manystates. If your form doesn't tell you what to address, it may be hard to know what to include in your advance directive. Use the questions below to help you getstarted.  Who do you want to make decisions about your medical care if you are not able to?  What life-support measures do you want if you have a serious illness that gets worse over time or can't be cured?  What are you most afraid of that might happen?  (Maybe you're afraid of having pain, losing your independence, or being kept alive by machines.)   Where would you prefer to die? (Your home? A hospital? A nursing home?)   Do you want to donate your organs when you die?  Do you want certain Protestant practices performed before you die? When should you call for help? Be sure to contact your doctor if you have any questions. Where can you learn more? Go to [email or web address] and sign in to your [employer] account. Enter R264 in the [employer] box to learn more about \"Advance Directives: Care Instructions. \"     If you do not have an account, please click on the \"Sign Up Now\" link. Current as of: October 18, 2021               Content Version: 13.2  © 3723-0105 Healthwise, [employer]. Care instructions adapted under license by [facility] ([facility]). If you have questions about a medical condition or this instruction, always ask your healthcare professional. [place] [address] any warranty or liability for your use of this information. Personalized Preventive Plan for Kayleigh Moreno - 4/25/2022  Medicare offers a range of preventive health benefits. Some of the tests and screenings are paid in full while other may be subject to a deductible, co-insurance, and/or copay. Some of these benefits include a comprehensive review of your medical history including lifestyle, illnesses that may run in your family, and various assessments and screenings as appropriate. After reviewing your medical record and screening and assessments performed today your provider may have ordered immunizations, labs, imaging, and/or referrals for you. A list of these orders (if applicable) as well as your Preventive Care list are included within your After Visit Summary for your review.     Other Preventive Recommendations:    · A preventive eye exam performed by an eye specialist is recommended every 1-2 years to screen for glaucoma; cataracts, macular degeneration, and other eye disorders. · A preventive dental visit is recommended every 6 months. · Try to get at least 150 minutes of exercise per week or 10,000 steps per day on a pedometer . · Order or download the FREE \"Exercise & Physical Activity: Your Everyday Guide\" from The [employer]. Call 2-196.456.8343 or search The [employer] online. · You need 1944-1212 mg of calcium and 4632-2208 IU of vitamin D per day. It is possible to meet your calcium requirement with diet alone, but a vitamin D supplement is usually necessary to meet this goal.  · When exposed to the sun, use a sunscreen that protects against both UVA and UVB radiation with an SPF of 30 or greater. Reapply every 2 to 3 hours or after sweating, drying off with a towel, or swimming. · Always wear a seat belt when traveling in a car. Always wear a helmet when riding a bicycle or motorcycle.

## 2022-04-25 NOTE — PROGRESS NOTES
Medicare Annual Wellness Visit    Liya Piper is here for Medicare AWV    Assessment & Plan   Initial Medicare annual wellness visit      Recommendations for Preventive Services Due: see orders and patient instructions/AVS.  Recommended screening schedule for the next 5-10 years is provided to the patient in written form: see Patient Instructions/AVS.     Return for Medicare Annual Wellness Visit in 1 year. Subjective   The following acute and/or chronic problems were also addressed today:    Patient's complete Health Risk Assessment and screening values have been reviewed and are found in Flowsheets. The following problems were reviewed today and where indicated follow up appointments were made and/or referrals ordered. Positive Risk Factor Screenings with Interventions:    Fall Risk:  Do you feel unsteady or are you worried about falling? : (!) yes  2 or more falls in past year?: (!) yes  Fall with injury in past year?: no     Fall Risk Interventions:    · Home safety tips provided             Health Habits/Nutrition:     Physical Activity: Inactive    Days of Exercise per Week: 0 days    Minutes of Exercise per Session: 0 min     Have you lost any weight without trying in the past 3 months?: No     Have you seen the dentist within the past year?: Yes    Health Habits/Nutrition Interventions:  · No current complaints. Hearing/Vision:  Do you or your family notice any trouble with your hearing that hasn't been managed with hearing aids?: (!) Yes  Do you have difficulty driving, watching TV, or doing any of your daily activities because of your eyesight?: No  Have you had an eye exam within the past year?: Yes  No exam data present    Hearing/Vision Interventions:  · Mild hearing deficit left ear. Recommending further evaluation with ear nose and throat if needed.     Safety:  Do you have working smoke detectors?: (!) No  Do you have any tripping hazards - loose or unsecured carpets or rugs?: No  Do you have any tripping hazards - clutter in doorways, halls, or stairs?: (!) Yes  Do you have either shower bars, grab bars, non-slip mats or non-slip surfaces in your shower or bathtub?: Yes  Do all of your stairways have a railing or banister?: Yes  Do you always fasten your seatbelt when you are in a car?: Yes    Safety Interventions:  · Home safety tips provided           Objective   There were no vitals filed for this visit. There is no height or weight on file to calculate BMI. Allergies   Allergen Reactions    Penicillins      Prior to Visit Medications    Medication Sig Taking?  Authorizing Provider   simvastatin (ZOCOR) 40 MG tablet Take 1 tablet by mouth nightly  Rainer Guillory, DO   apixaban (ELIQUIS) 2.5 MG TABS tablet Take 1 tablet by mouth 2 times daily  Everton Brownlee APRN - NORA   metoprolol tartrate (LOPRESSOR) 25 MG tablet 1/2 tablet twice a day  Raquel Serrano DO   DULoxetine (CYMBALTA) 60 MG extended release capsule TAKE ONE CAPSULE BY MOUTH DAILY  Rainer Guillory,    solifenacin (VESICARE) 5 MG tablet Take 5 mg by mouth daily   Historical Provider, MD   docusate sodium (COLACE) 100 MG capsule Take 100 mg by mouth daily as needed for Constipation   Historical Provider, MD [place] (Including outside providers/suppliers regularly involved in providing care):   Patient Care Team:  Raquel Serrano DO as PCP - [address],  as PCP - [address] Provider    Reviewed and updated this visit:  Meds

## 2022-04-28 ENCOUNTER — HOSPITAL ENCOUNTER (OUTPATIENT)
Age: 82
Discharge: HOME | End: 2022-04-28
Payer: MEDICARE

## 2022-04-28 DIAGNOSIS — I10 HYPERTENSION, ESSENTIAL, BENIGN: Chronic | ICD-10-CM

## 2022-04-28 DIAGNOSIS — R41.89 COGNITIVE CHANGE: ICD-10-CM

## 2022-04-28 DIAGNOSIS — I82.5Y9 CHRONIC DEEP VEIN THROMBOSIS (DVT) OF PROXIMAL VEIN OF LOWER EXTREMITY, UNSPECIFIED LATERALITY (HCC): ICD-10-CM

## 2022-04-28 DIAGNOSIS — E78.5 HYPERLIPIDEMIA, UNSPECIFIED HYPERLIPIDEMIA TYPE: Chronic | ICD-10-CM

## 2022-04-28 DIAGNOSIS — R73.01 IMPAIRED FASTING GLUCOSE: ICD-10-CM

## 2022-04-28 LAB
ALBUMIN SERPL-MCNC: 4.4 G/DL (ref 3.5–5.2)
ALP BLD-CCNC: 39 U/L (ref 35–104)
ALT SERPL-CCNC: 11 U/L (ref 0–32)
ANION GAP SERPL CALCULATED.3IONS-SCNC: 9 MMOL/L (ref 7–16)
AST SERPL-CCNC: 17 U/L (ref 0–31)
BASOPHILS ABSOLUTE: 0.02 E9/L (ref 0–0.2)
BASOPHILS RELATIVE PERCENT: 0.4 % (ref 0–2)
BILIRUB SERPL-MCNC: 0.5 MG/DL (ref 0–1.2)
BUN BLDV-MCNC: 22 MG/DL (ref 6–23)
CALCIUM SERPL-MCNC: 10.8 MG/DL (ref 8.6–10.2)
CHLORIDE BLD-SCNC: 106 MMOL/L (ref 98–107)
CHOLESTEROL, TOTAL: 142 MG/DL (ref 0–199)
CO2: 27 MMOL/L (ref 22–29)
CREAT SERPL-MCNC: 1.7 MG/DL (ref 0.5–1)
EOSINOPHILS ABSOLUTE: 0.16 E9/L (ref 0.05–0.5)
EOSINOPHILS RELATIVE PERCENT: 3.1 % (ref 0–6)
GFR AFRICAN AMERICAN: 35
GFR NON-AFRICAN AMERICAN: 29 ML/MIN/1.73
GLUCOSE BLD-MCNC: 114 MG/DL (ref 74–99)
HBA1C MFR BLD: 5.5 % (ref 4–5.6)
HCT VFR BLD CALC: 43.7 % (ref 34–48)
HDLC SERPL-MCNC: 46 MG/DL
HEMOGLOBIN: 14.8 G/DL (ref 11.5–15.5)
IMMATURE GRANULOCYTES #: 0.02 E9/L
IMMATURE GRANULOCYTES %: 0.4 % (ref 0–5)
LDL CHOLESTEROL CALCULATED: 60 MG/DL (ref 0–99)
LYMPHOCYTES ABSOLUTE: 0.93 E9/L (ref 1.5–4)
LYMPHOCYTES RELATIVE PERCENT: 17.8 % (ref 20–42)
MCH RBC QN AUTO: 31.8 PG (ref 26–35)
MCHC RBC AUTO-ENTMCNC: 33.9 % (ref 32–34.5)
MCV RBC AUTO: 94 FL (ref 80–99.9)
MONOCYTES ABSOLUTE: 0.46 E9/L (ref 0.1–0.95)
MONOCYTES RELATIVE PERCENT: 8.8 % (ref 2–12)
NEUTROPHILS ABSOLUTE: 3.64 E9/L (ref 1.8–7.3)
NEUTROPHILS RELATIVE PERCENT: 69.5 % (ref 43–80)
PDW BLD-RTO: 12.9 FL (ref 11.5–15)
PLATELET # BLD: 185 E9/L (ref 130–450)
PMV BLD AUTO: 10 FL (ref 7–12)
POTASSIUM SERPL-SCNC: 4.7 MMOL/L (ref 3.5–5)
RBC # BLD: 4.65 E12/L (ref 3.5–5.5)
SODIUM BLD-SCNC: 142 MMOL/L (ref 132–146)
T4 TOTAL: 7.7 MCG/DL (ref 4.5–11.7)
TOTAL PROTEIN: 7.3 G/DL (ref 6.4–8.3)
TRIGL SERPL-MCNC: 179 MG/DL (ref 0–149)
TSH SERPL DL<=0.05 MIU/L-ACNC: 3.66 UIU/ML (ref 0.27–4.2)
VLDLC SERPL CALC-MCNC: 36 MG/DL
WBC # BLD: 5.2 E9/L (ref 4.5–11.5)

## 2022-04-28 PROCEDURE — 84443 ASSAY THYROID STIM HORMONE: CPT

## 2022-04-28 PROCEDURE — 36415 COLL VENOUS BLD VENIPUNCTURE: CPT

## 2022-04-28 PROCEDURE — 83036 HEMOGLOBIN GLYCOSYLATED A1C: CPT

## 2022-04-28 PROCEDURE — 85025 COMPLETE CBC W/AUTO DIFF WBC: CPT

## 2022-04-28 PROCEDURE — 84436 ASSAY OF TOTAL THYROXINE: CPT

## 2022-04-28 PROCEDURE — 80061 LIPID PANEL: CPT

## 2022-04-28 PROCEDURE — 80053 COMPREHEN METABOLIC PANEL: CPT

## 2022-05-04 ENCOUNTER — CARE COORDINATION (OUTPATIENT)
Dept: CARE COORDINATION | Age: 82
End: 2022-05-04

## 2022-05-04 NOTE — CARE COORDINATION
-ACM attempted to reach patient to offer enrollment into Care Coordination program, however the phone recording said the call party is busy. Unable to leave a voice message.   -if no return call, will attempt outreach again.

## 2022-05-06 ENCOUNTER — CARE COORDINATION (OUTPATIENT)
Dept: CARE COORDINATION | Age: 82
End: 2022-05-06

## 2022-05-06 NOTE — CARE COORDINATION
-[facility] called [facility] [place]. Spoke to Keegan said she does not have patient in the office system. Epifanio Velazquez said the system does not reflect whether a referral came through or not .  -Epifanio Velazquez said referral calls take place only on Mondays by Teodoro Steven said to fax another referral to the office. If the referral comes through today, the referral should be called this Monday, 5/9/2022. -Will call [facility] referrals.

## 2022-05-06 NOTE — CARE COORDINATION
-[facility] called and spoke to Pt's sister, Fan German. -Informed Fan German patient was not in the [place] office system and [place] requested another referral be faxed to the office.  -Informed Carri Bey Referral was informed and are faxing another referral request today to [place] with attention to [address] [place] [address] that [place] said if the referral comes through today, Fan Coreanohemi should receive a call from [place] on Monday 5-9-2022 to schedule an appt for Pt.   -Discussed with Fan Coreanohemi, if she does not receive a call from [place] on Monday 5/9/2022, Fan German should call them. Fan German said she agreed.

## 2022-05-06 NOTE — CARE COORDINATION
-[facility] attempted to reach [facility] referral, however no answer. -HIPAA compliant VM left introducing self and reason for call.  -Left ACM's contact information, requesting call back. If no return call, will attempt outreach again. [facility] Referral returned call, spoke to Sameer. [facility] discussed [facility] does not have Pt in their system and a new referral needs faxed over. Sameer said she will refax referral today to [facility] with a cover letter attention to [facility].   -Will inform Pt's sister, Jose Bruno.

## 2022-05-06 NOTE — CARE COORDINATION
-[facility]'s second attempt to reach patient  to offer enrollment into Care Coordination program, however no answer. -HIPAA compliant  left introducing self, reason for call, and brief explanation of program.  -Left [facility]'s contact information, requesting call back. If no return call, will attempt outreach again.

## 2022-05-06 NOTE — CARE COORDINATION
-[facility] (HIPAA) returned call. -AC spoke to patient's sister, Pee Aguilera to offer enrollment into Care Coordination program.  -Introduced self, reason for call, and explanation of program.   -Pee Aguilera declined at this time.  Pee Aguilera said she wants to go through the [facility] first.   Estela Colmenares reports [facility].   -Pee Aguilera reports she has spoke to [address] [place], [place] [address] regarding Pt's poor living conditions and poor ADL's.   Estela Colmenares reports Pt uses a walker and no longer drives a car.   Estela Colmenares encouraged to contact [facility] or inform PCP if she should change her mind.  -Will remove name from care team.

## 2022-05-11 ENCOUNTER — CARE COORDINATION (OUTPATIENT)
Dept: CARE COORDINATION | Age: 82
End: 2022-05-11

## 2022-05-11 NOTE — CARE COORDINATION
-[facility] received a voice message from patient's sister, [place] requesting a return call. -[facility] returned call to Jarred Hess reports she spoke with Aidan Dang at [place] [address] reports Aidan Dang received the Neuropsychology referral. [place] [facility] sent a request to [employer] for authorization. [place] office is waiting for the insurance determination. It could take 10 to 14 business days.   -Jose Bruno reports once the insurance approves referral, [facility] schedules a pre meeting appt to complete an assessment gathering information. Then a second appt is scheduled for testing.   -Jose Bruno is waiting for determination from [place] office. -[facility] will continue/assist with Melvi's return call.

## 2022-08-11 RX ORDER — DULOXETIN HYDROCHLORIDE 60 MG/1
CAPSULE, DELAYED RELEASE ORAL
Qty: 90 CAPSULE | Refills: 1 | Status: SHIPPED | OUTPATIENT
Start: 2022-08-11

## 2022-09-26 RX ORDER — APIXABAN 2.5 MG/1
TABLET, FILM COATED ORAL
Qty: 180 TABLET | Refills: 0 | Status: SHIPPED | OUTPATIENT
Start: 2022-09-26

## 2022-10-03 ENCOUNTER — TELEPHONE (OUTPATIENT)
Dept: PRIMARY CARE CLINIC | Age: 82
End: 2022-10-03

## 2022-10-03 DIAGNOSIS — R73.01 IMPAIRED FASTING GLUCOSE: ICD-10-CM

## 2022-10-03 DIAGNOSIS — I10 HYPERTENSION, ESSENTIAL, BENIGN: Primary | ICD-10-CM

## 2022-10-03 DIAGNOSIS — I48.91 ATRIAL FIBRILLATION, CONTROLLED (HCC): ICD-10-CM

## 2022-10-03 DIAGNOSIS — R41.89 COGNITIVE CHANGE: ICD-10-CM

## 2022-10-03 DIAGNOSIS — E78.5 HYPERLIPIDEMIA, UNSPECIFIED HYPERLIPIDEMIA TYPE: ICD-10-CM

## 2022-10-03 NOTE — TELEPHONE ENCOUNTER
All set 78F presenting with lower abdominal pain. tender on exam. will get labs, urine, CT abdomen. will reassess.

## 2022-11-03 RX ORDER — SIMVASTATIN 40 MG
TABLET ORAL
Qty: 30 TABLET | Refills: 5 | Status: SHIPPED | OUTPATIENT
Start: 2022-11-03

## 2022-11-07 ENCOUNTER — OFFICE VISIT (OUTPATIENT)
Dept: PRIMARY CARE CLINIC | Age: 82
End: 2022-11-07
Payer: MEDICARE

## 2022-11-07 VITALS
SYSTOLIC BLOOD PRESSURE: 126 MMHG | WEIGHT: 200 LBS | DIASTOLIC BLOOD PRESSURE: 84 MMHG | BODY MASS INDEX: 28.7 KG/M2 | TEMPERATURE: 98.2 F | HEART RATE: 86 BPM | OXYGEN SATURATION: 95 %

## 2022-11-07 DIAGNOSIS — I48.91 ATRIAL FIBRILLATION, CONTROLLED (HCC): ICD-10-CM

## 2022-11-07 DIAGNOSIS — Z96.651 HISTORY OF TOTAL RIGHT KNEE REPLACEMENT: ICD-10-CM

## 2022-11-07 DIAGNOSIS — R41.89 COGNITIVE CHANGE: ICD-10-CM

## 2022-11-07 DIAGNOSIS — E78.5 HYPERLIPIDEMIA, UNSPECIFIED HYPERLIPIDEMIA TYPE: Chronic | ICD-10-CM

## 2022-11-07 DIAGNOSIS — I10 HYPERTENSION, ESSENTIAL, BENIGN: Primary | Chronic | ICD-10-CM

## 2022-11-07 DIAGNOSIS — Z86.718 HISTORY OF DVT (DEEP VEIN THROMBOSIS): ICD-10-CM

## 2022-11-07 DIAGNOSIS — R73.01 IMPAIRED FASTING GLUCOSE: ICD-10-CM

## 2022-11-07 DIAGNOSIS — N18.4 CKD (CHRONIC KIDNEY DISEASE) STAGE 4, GFR 15-29 ML/MIN (HCC): ICD-10-CM

## 2022-11-07 PROBLEM — N18.30 CHRONIC RENAL DISEASE, STAGE III (HCC): Status: ACTIVE | Noted: 2022-11-07

## 2022-11-07 PROCEDURE — 3074F SYST BP LT 130 MM HG: CPT | Performed by: FAMILY MEDICINE

## 2022-11-07 PROCEDURE — G0008 ADMIN INFLUENZA VIRUS VAC: HCPCS | Performed by: FAMILY MEDICINE

## 2022-11-07 PROCEDURE — 90694 VACC AIIV4 NO PRSRV 0.5ML IM: CPT | Performed by: FAMILY MEDICINE

## 2022-11-07 PROCEDURE — 3078F DIAST BP <80 MM HG: CPT | Performed by: FAMILY MEDICINE

## 2022-11-07 PROCEDURE — 99214 OFFICE O/P EST MOD 30 MIN: CPT | Performed by: FAMILY MEDICINE

## 2022-11-07 PROCEDURE — 1123F ACP DISCUSS/DSCN MKR DOCD: CPT | Performed by: FAMILY MEDICINE

## 2022-11-07 ASSESSMENT — ENCOUNTER SYMPTOMS
ALLERGIC/IMMUNOLOGIC NEGATIVE: 1
RESPIRATORY NEGATIVE: 1
GASTROINTESTINAL NEGATIVE: 1
EYES NEGATIVE: 1

## 2022-11-07 NOTE — PROGRESS NOTES
22     Nela Reynaga    : 1940 Sex: female   Age: [de-identified] y.o. Chief Complaint   Patient presents with    Medication Refill       Prior to Admission medications    Medication Sig Start Date End Date Taking? Authorizing Provider   apixaban (ELIQUIS) 2.5 MG TABS tablet TAKE ONE TABLET BY MOUTH TWO TIMES A DAY 22  Yes Sweetie Guillory, DO   metoprolol tartrate (LOPRESSOR) 25 MG tablet 1/2 tablet twice a day 22  Yes Samuel Guillory, DO   simvastatin (ZOCOR) 40 MG tablet TAKE ONE TABLET BY MOUTH NIGHTLY 11/3/22  Yes Sweetie Guillory, DO   DULoxetine (CYMBALTA) 60 MG extended release capsule TAKE ONE CAPSULE BY MOUTH DAILY 22  Yes Sweetie Guillory,    docusate sodium (COLACE) 100 MG capsule Take 100 mg by mouth daily as needed for Constipation  11  Yes Historical Provider, MD          HPI: Tammy Jean presents today in medical follow-up with history of chronic kidney disease atrial fibrillation impaired fasting glucose hyperlipidemia hypertension. Lab studies completed and she will have that done prior to next visit. Problems with cognitive change and has yet to have evaluation with [facility] and that is to be scheduled. Continues to remain at home with the assistance of her sister Dariana Camara. Review of Systems   Constitutional: Negative. HENT: Negative. Eyes: Negative. Respiratory: Negative. Gastrointestinal: Negative. Endocrine: Negative. Genitourinary: Negative. Musculoskeletal:  Positive for arthralgias and gait problem. Skin: Negative. Allergic/Immunologic: Negative. Hematological: Negative. Psychiatric/Behavioral: Negative. Some muscular weakness and arthralgias. Continued concerns with cognitive decline. Maintain present meds. Additional lab studies and then follow-up next 4 to 6 weeks and sooner if problems.         Current Outpatient Medications:     apixaban (ELIQUIS) 2.5 MG TABS tablet, TAKE ONE TABLET BY MOUTH TWO TIMES A DAY, Disp: 180 tablet, Rfl: 3    metoprolol tartrate (LOPRESSOR) 25 MG tablet, 1/2 tablet twice a day, Disp: 90 tablet, Rfl: 1    simvastatin (ZOCOR) 40 MG tablet, TAKE ONE TABLET BY MOUTH NIGHTLY, Disp: 30 tablet, Rfl: 5    DULoxetine (CYMBALTA) 60 MG extended release capsule, TAKE ONE CAPSULE BY MOUTH DAILY, Disp: 90 capsule, Rfl: 1    docusate sodium (COLACE) 100 MG capsule, Take 100 mg by mouth daily as needed for Constipation , Disp: , Rfl:     Allergies   Allergen Reactions    Penicillins        Social History     Tobacco Use    Smoking status: Never    Smokeless tobacco: Never   Vaping Use    Vaping Use: Never used   Substance Use Topics    Alcohol use: Never    Drug use: Never      Past Surgical History:   Procedure Laterality Date    APPENDECTOMY      BLADDER STONE REMOVAL      CATARACT REMOVAL      COLONOSCOPY      DILATION AND CURETTAGE OF UTERUS      HERNIA REPAIR      TOTAL KNEE ARTHROPLASTY      right    TOTAL NEPHRECTOMY      right     Family History   Problem Relation Age of Onset    Stroke Mother     Other Mother         phlebitis     Past Medical History:   Diagnosis Date    Acute saddle pulmonary embolism with acute cor pulmonale ([facility] [address] 12/16/2020    Arthritis     Cataracts, bilateral     Cystitis     Depression     DVT of lower limb, acute ([address] 03/15/2013    Glaucoma     s/p stent placement    Hernia     HIGH CHOLESTEROL     Hypertension     Kidney stones     Sinus infection     Superficial phlebitis of leg 10/30/2014       Vitals:    11/07/22 1642   BP: 126/84   Pulse: 86   Temp: 98.2 °F (36.8 °C)   SpO2: 95%   Weight: 200 lb (90.7 kg)     BP Readings from Last 3 Encounters:   11/07/22 126/84   04/25/22 106/70   07/22/21 108/84        Physical Exam  Vitals and nursing note reviewed. Constitutional:       Appearance: She is well-developed. HENT:      Head: Normocephalic.       Right Ear: External ear normal.      Left Ear: External ear normal.      Nose: Nose normal.   Eyes: Conjunctiva/sclera: Conjunctivae normal.      Pupils: Pupils are equal, round, and reactive to light. Cardiovascular:      Rate and Rhythm: Normal rate. Pulmonary:      Breath sounds: Normal breath sounds. Abdominal:      General: Bowel sounds are normal.      Palpations: Abdomen is soft. Musculoskeletal:         General: Normal range of motion. Cervical back: Normal range of motion and neck supple. Skin:     General: Skin is warm and dry. Neurological:      Mental Status: She is alert and oriented to person, place, and time. Psychiatric:         Behavior: Behavior normal.   Alert and oriented to person place and time today. Mitts to difficulties at home with managing personal affairs. Agreeable to further evaluation. Agreeable to the assistance at home from her sister. Understands that assisted care may be in her best interest in the future. We will have further discussion on return. Plan Per Assessment:  Aurelia Duke was seen today for medication refill. Diagnoses and all orders for this visit:    Hypertension, essential, benign    CKD (chronic kidney disease) stage 4, GFR 15-29 ml/min (HCC)    Atrial fibrillation, controlled (HCC)    Impaired fasting glucose    Hyperlipidemia, unspecified hyperlipidemia type    Cognitive change    History of total right knee replacement    History of DVT (deep vein thrombosis)    Other orders  -     Influenza, FLUAD, (age 72 y+), IM, Preservative Free, 0.5 mL  -     apixaban (ELIQUIS) 2.5 MG TABS tablet; TAKE ONE TABLET BY MOUTH TWO TIMES A DAY  -     metoprolol tartrate (LOPRESSOR) 25 MG tablet; 1/2 tablet twice a day          Return in about 6 weeks (around 12/19/2022). Joseph Padilla DO    Note was generated with the assistance of voice recognition software. Document was reviewed however may contain grammatical errors.

## 2022-11-11 ENCOUNTER — CARE COORDINATION (OUTPATIENT)
Dept: CARE COORDINATION | Age: 82
End: 2022-11-11

## 2022-11-11 NOTE — CARE COORDINATION
-[facility] received a voice message from patient's sister, [facility] requesting a return call. -[facility] returned call to [address] reports Pt declined scheduling an appt in May 2022 with [facility] when she learned residing in a [facility] maybe the end determination.   Reena Langley learned Pt did not have an appt with PCP this fall and [facility] made an appt for Pt on 11-7-2022. [facility] said Pt has come to realize Pt would benefit at a [facility] at this PCP appt. Reena Langley reports Pt  has an [facility] appt 12/16/2022 for Intake meeting assessment. A second [facility] appt maybe scheduled after the Intake meeting for further evaluation.   -Pt is concerned Pt may have to sale her condo and cash in her insurance policy to enter [facility]. Reena Langley reports Pt is belligerent at times especially when talking about [address] However, now Pt has agreed to consider going to [facility]. Reena Langley reports she and Pt are considering [employer] [facility]. Pt's mother was a resident there at one time and Pt is familiar and comfortable. Reena Langley reports seeing a decline in Pt's mental and physical status.   -[facility] reports Pt has poor ADL hygiene and poor housekeeping.    -[facility] reports Pt no longer drives. [facility] is her transportation.   Reena Langley reports she handles all of Pt's finances.

## 2022-11-26 ENCOUNTER — HOSPITAL ENCOUNTER (OUTPATIENT)
Age: 82
Discharge: HOME | End: 2022-11-26
Payer: MEDICARE

## 2022-11-26 DIAGNOSIS — R41.89 COGNITIVE CHANGE: ICD-10-CM

## 2022-11-26 DIAGNOSIS — R73.01 IMPAIRED FASTING GLUCOSE: ICD-10-CM

## 2022-11-26 DIAGNOSIS — I10 HYPERTENSION, ESSENTIAL, BENIGN: ICD-10-CM

## 2022-11-26 DIAGNOSIS — E78.5 HYPERLIPIDEMIA, UNSPECIFIED HYPERLIPIDEMIA TYPE: ICD-10-CM

## 2022-11-26 DIAGNOSIS — I48.91 ATRIAL FIBRILLATION, CONTROLLED (HCC): ICD-10-CM

## 2022-11-26 LAB
ALBUMIN SERPL-MCNC: 4.2 G/DL (ref 3.5–5.2)
ALP BLD-CCNC: 36 U/L (ref 35–104)
ALT SERPL-CCNC: 8 U/L (ref 0–32)
ANION GAP SERPL CALCULATED.3IONS-SCNC: 11 MMOL/L (ref 7–16)
AST SERPL-CCNC: 18 U/L (ref 0–31)
BASOPHILS ABSOLUTE: 0.03 E9/L (ref 0–0.2)
BASOPHILS RELATIVE PERCENT: 0.6 % (ref 0–2)
BILIRUB SERPL-MCNC: 0.6 MG/DL (ref 0–1.2)
BUN BLDV-MCNC: 20 MG/DL (ref 6–23)
CALCIUM SERPL-MCNC: 10.7 MG/DL (ref 8.6–10.2)
CHLORIDE BLD-SCNC: 106 MMOL/L (ref 98–107)
CHOLESTEROL, TOTAL: 142 MG/DL (ref 0–199)
CO2: 24 MMOL/L (ref 22–29)
CREAT SERPL-MCNC: 1.7 MG/DL (ref 0.5–1)
EOSINOPHILS ABSOLUTE: 0.14 E9/L (ref 0.05–0.5)
EOSINOPHILS RELATIVE PERCENT: 2.9 % (ref 0–6)
GFR SERPL CREATININE-BSD FRML MDRD: 30 ML/MIN/1.73
GLUCOSE BLD-MCNC: 106 MG/DL (ref 74–99)
HBA1C MFR BLD: 5.4 % (ref 4–5.6)
HCT VFR BLD CALC: 43.7 % (ref 34–48)
HDLC SERPL-MCNC: 47 MG/DL
HEMOGLOBIN: 14.8 G/DL (ref 11.5–15.5)
IMMATURE GRANULOCYTES #: 0.01 E9/L
IMMATURE GRANULOCYTES %: 0.2 % (ref 0–5)
LDL CHOLESTEROL CALCULATED: 65 MG/DL (ref 0–99)
LYMPHOCYTES ABSOLUTE: 1.09 E9/L (ref 1.5–4)
LYMPHOCYTES RELATIVE PERCENT: 22.8 % (ref 20–42)
MCH RBC QN AUTO: 31.4 PG (ref 26–35)
MCHC RBC AUTO-ENTMCNC: 33.9 % (ref 32–34.5)
MCV RBC AUTO: 92.8 FL (ref 80–99.9)
MONOCYTES ABSOLUTE: 0.36 E9/L (ref 0.1–0.95)
MONOCYTES RELATIVE PERCENT: 7.5 % (ref 2–12)
NEUTROPHILS ABSOLUTE: 3.16 E9/L (ref 1.8–7.3)
NEUTROPHILS RELATIVE PERCENT: 66 % (ref 43–80)
PDW BLD-RTO: 12.4 FL (ref 11.5–15)
PLATELET # BLD: 212 E9/L (ref 130–450)
PMV BLD AUTO: 9.9 FL (ref 7–12)
POTASSIUM SERPL-SCNC: 4.4 MMOL/L (ref 3.5–5)
RBC # BLD: 4.71 E12/L (ref 3.5–5.5)
SODIUM BLD-SCNC: 141 MMOL/L (ref 132–146)
T4 TOTAL: 8.1 MCG/DL (ref 4.5–11.7)
TOTAL PROTEIN: 7.1 G/DL (ref 6.4–8.3)
TRIGL SERPL-MCNC: 149 MG/DL (ref 0–149)
TSH SERPL DL<=0.05 MIU/L-ACNC: 5.04 UIU/ML (ref 0.27–4.2)
VLDLC SERPL CALC-MCNC: 30 MG/DL
WBC # BLD: 4.8 E9/L (ref 4.5–11.5)

## 2022-11-26 PROCEDURE — 84436 ASSAY OF TOTAL THYROXINE: CPT

## 2022-11-26 PROCEDURE — 83036 HEMOGLOBIN GLYCOSYLATED A1C: CPT

## 2022-11-26 PROCEDURE — 80061 LIPID PANEL: CPT

## 2022-11-26 PROCEDURE — 84443 ASSAY THYROID STIM HORMONE: CPT

## 2022-11-26 PROCEDURE — 36415 COLL VENOUS BLD VENIPUNCTURE: CPT

## 2022-11-26 PROCEDURE — 80053 COMPREHEN METABOLIC PANEL: CPT

## 2022-11-26 PROCEDURE — 85025 COMPLETE CBC W/AUTO DIFF WBC: CPT

## 2022-12-08 ENCOUNTER — CARE COORDINATION (OUTPATIENT)
Dept: CARE COORDINATION | Age: 82
End: 2022-12-08

## 2022-12-08 NOTE — CARE COORDINATION
-[facility] received a voice message from patient's sister Henri Muñoz requesting a return call. -[facility] returned call to [facility]. reports Pt is now interested in going to [address] reports she made another appt and took patient to [address]. Henri Muñoz said Pt had worked at [employer] part time at one time and [employer] is familiar to Pt. Pt told Sanket Mock from [employer], Pt is ready to move in February 1st 2023. For Pt's health and safety, this makes Henri Muñoz happy. Sean Calzada reports she has a company called [employer]. Henri Muñoz plans to hire this company to clean and sanitize Pt's condo in preparation to get Pt's condo ready for the market.   -Henri Muñoz reports she plans to keep the [facility] appt on 12- for psych evaluation and cognitive assessment for Pt.   -Henri Muñoz reports she does not plan to have the in home Social Work consult since Pt agrees to go to Jake Garza reports Pt has been more kind and friendly verses carisa and [employer]. Henri Muñoz is not sure what caused the change. However, Henri Muñoz said she will continue to monitor Pt's mental status.

## 2022-12-16 ENCOUNTER — TELEPHONE (OUTPATIENT)
Dept: PRIMARY CARE CLINIC | Age: 82
End: 2022-12-16

## 2022-12-16 NOTE — LETTER
[address] [place] [address]  [phone]   [address], DO      To Whom it May Concern:    Suzette Fuentes   1940     [facility] [address]      The above patient is a current patient of [facility] with Dr Radha Parra. If you have any questions or concerns please do not hesitate to contact us.        Thank you,         Roe Mercedes, DO

## 2022-12-16 NOTE — TELEPHONE ENCOUNTER
The pt needs to get a social security card but they are requiring a letter from her doctor stating her name, date of birth, social security number, and that she is a pt here on a [facility]) letter head as proof that she is who she is

## 2023-01-23 NOTE — TELEPHONE ENCOUNTER
Pt daughter notified. They are going through testing from [place] and wont be finished until march. Will schedule when results are back from that.

## 2023-02-27 RX ORDER — DULOXETIN HYDROCHLORIDE 60 MG/1
CAPSULE, DELAYED RELEASE ORAL
Qty: 90 CAPSULE | Refills: 1 | Status: SHIPPED | OUTPATIENT
Start: 2023-02-27

## 2023-05-22 RX ORDER — SIMVASTATIN 40 MG
TABLET ORAL
Qty: 30 TABLET | Refills: 0 | Status: SHIPPED | OUTPATIENT
Start: 2023-05-22

## 2023-12-01 ENCOUNTER — HOSPITAL ENCOUNTER (OUTPATIENT)
Age: 83
Discharge: HOME | End: 2023-12-01
Payer: MEDICARE

## 2023-12-01 LAB
ALBUMIN SERPL-MCNC: 4.4 G/DL (ref 3.5–5.2)
ALP SERPL-CCNC: 37 U/L (ref 35–104)
ALT SERPL-CCNC: 9 U/L (ref 0–32)
ANION GAP SERPL CALCULATED.3IONS-SCNC: 12 MMOL/L (ref 7–16)
AST SERPL-CCNC: 16 U/L (ref 0–31)
BILIRUB SERPL-MCNC: 0.6 MG/DL (ref 0–1.2)
BUN SERPL-MCNC: 22 MG/DL (ref 6–23)
CALCIUM SERPL-MCNC: 11.2 MG/DL (ref 8.6–10.2)
CHLORIDE SERPL-SCNC: 105 MMOL/L (ref 98–107)
CO2 SERPL-SCNC: 27 MMOL/L (ref 22–29)
CREAT SERPL-MCNC: 1.6 MG/DL (ref 0.5–1)
GFR SERPL CREATININE-BSD FRML MDRD: 32 ML/MIN/1.73M2
GLUCOSE SERPL-MCNC: 95 MG/DL (ref 74–99)
PHOSPHATE SERPL-MCNC: 3.1 MG/DL (ref 2.5–4.5)
POTASSIUM SERPL-SCNC: 4.6 MMOL/L (ref 3.5–5)
PROT SERPL-MCNC: 7.5 G/DL (ref 6.4–8.3)
PTH-INTACT SERPL-MCNC: 80.8 PG/ML (ref 15–65)
SODIUM SERPL-SCNC: 144 MMOL/L (ref 132–146)
URATE SERPL-MCNC: 8.1 MG/DL (ref 2.4–5.7)

## 2023-12-01 PROCEDURE — 36415 COLL VENOUS BLD VENIPUNCTURE: CPT

## 2023-12-01 PROCEDURE — 84550 ASSAY OF BLOOD/URIC ACID: CPT

## 2023-12-01 PROCEDURE — 83970 ASSAY OF PARATHORMONE: CPT

## 2023-12-01 PROCEDURE — 84100 ASSAY OF PHOSPHORUS: CPT

## 2023-12-01 PROCEDURE — 80053 COMPREHEN METABOLIC PANEL: CPT

## 2024-04-24 ENCOUNTER — APPOINTMENT (OUTPATIENT)
Dept: CT IMAGING | Age: 84
End: 2024-04-24
Payer: MEDICARE

## 2024-04-24 ENCOUNTER — HOSPITAL ENCOUNTER (EMERGENCY)
Age: 84
Discharge: HOME | End: 2024-04-24
Attending: EMERGENCY MEDICINE
Payer: MEDICARE

## 2024-04-24 VITALS
HEART RATE: 96 BPM | OXYGEN SATURATION: 97 % | SYSTOLIC BLOOD PRESSURE: 150 MMHG | DIASTOLIC BLOOD PRESSURE: 98 MMHG | TEMPERATURE: 97.8 F | RESPIRATION RATE: 12 BRPM

## 2024-04-24 DIAGNOSIS — I62.9 INTRACRANIAL HEMORRHAGE (HCC): Primary | ICD-10-CM

## 2024-04-24 LAB
ALBUMIN SERPL-MCNC: 4.4 G/DL (ref 3.5–5.2)
ALP SERPL-CCNC: 34 U/L (ref 35–104)
ALT SERPL-CCNC: 12 U/L (ref 0–32)
ANION GAP SERPL CALCULATED.3IONS-SCNC: 15 MMOL/L (ref 7–16)
AST SERPL-CCNC: 29 U/L (ref 0–31)
BASOPHILS # BLD: 0.03 K/UL (ref 0–0.2)
BASOPHILS NFR BLD: 0 % (ref 0–2)
BILIRUB SERPL-MCNC: 0.6 MG/DL (ref 0–1.2)
BUN SERPL-MCNC: 20 MG/DL (ref 6–23)
CALCIUM SERPL-MCNC: 10.7 MG/DL (ref 8.6–10.2)
CHLORIDE SERPL-SCNC: 104 MMOL/L (ref 98–107)
CO2 SERPL-SCNC: 20 MMOL/L (ref 22–29)
CREAT SERPL-MCNC: 1.3 MG/DL (ref 0.5–1)
EKG ATRIAL RATE: 87 BPM
EKG P AXIS: 64 DEGREES
EKG P-R INTERVAL: 214 MS
EKG Q-T INTERVAL: 404 MS
EKG QRS DURATION: 74 MS
EKG QTC CALCULATION (BAZETT): 486 MS
EKG R AXIS: 48 DEGREES
EKG T AXIS: 58 DEGREES
EKG VENTRICULAR RATE: 87 BPM
EOSINOPHIL # BLD: 0.16 K/UL (ref 0.05–0.5)
EOSINOPHILS RELATIVE PERCENT: 2 % (ref 0–6)
ERYTHROCYTE [DISTWIDTH] IN BLOOD BY AUTOMATED COUNT: 13.1 % (ref 11.5–15)
GFR SERPL CREATININE-BSD FRML MDRD: 40 ML/MIN/1.73M2
GLUCOSE BLD-MCNC: 129 MG/DL (ref 74–99)
GLUCOSE SERPL-MCNC: 131 MG/DL (ref 74–99)
HCT VFR BLD AUTO: 44.9 % (ref 34–48)
HGB BLD-MCNC: 15.3 G/DL (ref 11.5–15.5)
IMM GRANULOCYTES # BLD AUTO: <0.03 K/UL (ref 0–0.58)
IMM GRANULOCYTES NFR BLD: 0 % (ref 0–5)
INR PPP: 1.1
LACTATE BLDV-SCNC: 1.9 MMOL/L (ref 0.5–2.2)
LYMPHOCYTES NFR BLD: 1.89 K/UL (ref 1.5–4)
LYMPHOCYTES RELATIVE PERCENT: 23 % (ref 20–42)
MCH RBC QN AUTO: 32.6 PG (ref 26–35)
MCHC RBC AUTO-ENTMCNC: 34.1 G/DL (ref 32–34.5)
MCV RBC AUTO: 95.7 FL (ref 80–99.9)
MONOCYTES NFR BLD: 0.54 K/UL (ref 0.1–0.95)
MONOCYTES NFR BLD: 7 % (ref 2–12)
NEUTROPHILS NFR BLD: 68 % (ref 43–80)
NEUTS SEG NFR BLD: 5.58 K/UL (ref 1.8–7.3)
PARTIAL THROMBOPLASTIN TIME: 28.8 SEC (ref 24.5–35.1)
PLATELET # BLD AUTO: 185 K/UL (ref 130–450)
PMV BLD AUTO: 10.4 FL (ref 7–12)
POTASSIUM SERPL-SCNC: 5.3 MMOL/L (ref 3.5–5)
PROT SERPL-MCNC: 7.5 G/DL (ref 6.4–8.3)
PROTHROMBIN TIME: 12 SEC (ref 9.3–12.4)
RBC # BLD AUTO: 4.69 M/UL (ref 3.5–5.5)
SODIUM SERPL-SCNC: 139 MMOL/L (ref 132–146)
WBC OTHER # BLD: 8.2 K/UL (ref 4.5–11.5)

## 2024-04-24 PROCEDURE — 99285 EMERGENCY DEPT VISIT HI MDM: CPT

## 2024-04-24 PROCEDURE — 2580000003 HC RX 258: Performed by: RADIOLOGY

## 2024-04-24 PROCEDURE — 85025 COMPLETE CBC W/AUTO DIFF WBC: CPT

## 2024-04-24 PROCEDURE — 80053 COMPREHEN METABOLIC PANEL: CPT

## 2024-04-24 PROCEDURE — 6360000004 HC RX CONTRAST MEDICATION: Performed by: RADIOLOGY

## 2024-04-24 PROCEDURE — 70496 CT ANGIOGRAPHY HEAD: CPT

## 2024-04-24 PROCEDURE — 6360000002 HC RX W HCPCS

## 2024-04-24 PROCEDURE — 93010 ELECTROCARDIOGRAM REPORT: CPT | Performed by: INTERNAL MEDICINE

## 2024-04-24 PROCEDURE — 96374 THER/PROPH/DIAG INJ IV PUSH: CPT

## 2024-04-24 PROCEDURE — 83605 ASSAY OF LACTIC ACID: CPT

## 2024-04-24 PROCEDURE — 85610 PROTHROMBIN TIME: CPT

## 2024-04-24 PROCEDURE — 96376 TX/PRO/DX INJ SAME DRUG ADON: CPT

## 2024-04-24 PROCEDURE — 82962 GLUCOSE BLOOD TEST: CPT

## 2024-04-24 PROCEDURE — 93005 ELECTROCARDIOGRAM TRACING: CPT

## 2024-04-24 PROCEDURE — 85730 THROMBOPLASTIN TIME PARTIAL: CPT

## 2024-04-24 PROCEDURE — 70450 CT HEAD/BRAIN W/O DYE: CPT

## 2024-04-24 PROCEDURE — 70498 CT ANGIOGRAPHY NECK: CPT

## 2024-04-24 RX ORDER — LEVETIRACETAM 500 MG/5ML
INJECTION, SOLUTION, CONCENTRATE INTRAVENOUS
Status: DISPENSED
Start: 2024-04-24 | End: 2024-04-24

## 2024-04-24 RX ORDER — TIMOLOL MALEATE 5 MG/ML
1 SOLUTION/ DROPS OPHTHALMIC DAILY
Status: ON HOLD | COMMUNITY

## 2024-04-24 RX ORDER — BIMATOPROST 0.3 MG/ML
1 SOLUTION/ DROPS OPHTHALMIC NIGHTLY
Status: ON HOLD | COMMUNITY

## 2024-04-24 RX ORDER — MORPHINE SULFATE 4 MG/ML
4 INJECTION, SOLUTION INTRAMUSCULAR; INTRAVENOUS
Status: DISCONTINUED | OUTPATIENT
Start: 2024-04-24 | End: 2024-04-25 | Stop reason: HOSPADM

## 2024-04-24 RX ORDER — SODIUM CHLORIDE 0.9 % (FLUSH) 0.9 %
10 SYRINGE (ML) INJECTION
Status: COMPLETED | OUTPATIENT
Start: 2024-04-24 | End: 2024-04-24

## 2024-04-24 RX ORDER — ACETAMINOPHEN 325 MG/1
650 TABLET ORAL EVERY 4 HOURS PRN
Status: ON HOLD | COMMUNITY

## 2024-04-24 RX ORDER — AZELASTINE HYDROCHLORIDE 137 UG/1
2 SPRAY, METERED NASAL EVERY 12 HOURS PRN
Status: ON HOLD | COMMUNITY

## 2024-04-24 RX ORDER — GUAIFENESIN 600 MG/1
600 TABLET, EXTENDED RELEASE ORAL 2 TIMES DAILY PRN
Status: ON HOLD | COMMUNITY

## 2024-04-24 RX ORDER — SIMVASTATIN 40 MG
40 TABLET ORAL NIGHTLY
Status: ON HOLD | COMMUNITY

## 2024-04-24 RX ORDER — BISACODYL 5 MG/1
5 TABLET, DELAYED RELEASE ORAL DAILY PRN
Status: ON HOLD | COMMUNITY

## 2024-04-24 RX ORDER — DULOXETIN HYDROCHLORIDE 60 MG/1
60 CAPSULE, DELAYED RELEASE ORAL DAILY
Status: ON HOLD | COMMUNITY

## 2024-04-24 RX ORDER — MORPHINE SULFATE 4 MG/ML
4 INJECTION, SOLUTION INTRAMUSCULAR; INTRAVENOUS ONCE
Status: COMPLETED | OUTPATIENT
Start: 2024-04-24 | End: 2024-04-24

## 2024-04-24 RX ORDER — CETIRIZINE HYDROCHLORIDE 10 MG/1
10 TABLET ORAL DAILY
Status: ON HOLD | COMMUNITY

## 2024-04-24 RX ORDER — SOLIFENACIN SUCCINATE 5 MG/1
5 TABLET, FILM COATED ORAL DAILY
Status: ON HOLD | COMMUNITY

## 2024-04-24 RX ADMIN — IOPAMIDOL 75 ML: 755 INJECTION, SOLUTION INTRAVENOUS at 10:31

## 2024-04-24 RX ADMIN — MORPHINE SULFATE 4 MG: 4 INJECTION, SOLUTION INTRAMUSCULAR; INTRAVENOUS at 16:42

## 2024-04-24 RX ADMIN — SODIUM CHLORIDE, PRESERVATIVE FREE 10 ML: 5 INJECTION INTRAVENOUS at 11:36

## 2024-04-24 RX ADMIN — MORPHINE SULFATE 4 MG: 4 INJECTION, SOLUTION INTRAMUSCULAR; INTRAVENOUS at 11:35

## 2024-04-24 ASSESSMENT — PAIN SCALES - GENERAL: PAINLEVEL_OUTOF10: 7

## 2024-04-24 ASSESSMENT — PAIN DESCRIPTION - LOCATION: LOCATION: HEAD

## 2024-04-24 NOTE — ED PROVIDER NOTES
Hospice consulted dementia or Parkinson's or any reiterates patient's comfort care measures.  Hospice will be consulted for [VG]   1626 Per hospice nurse, patient will be discharged to hospice house from the emergency room. [VG]      ED Course User Index  [VG] Miguel Alaniz MD        Medical Decision Making  Amount and/or Complexity of Data Reviewed  Labs: ordered.  Radiology: ordered.  ECG/medicine tests: ordered.    Risk  Prescription drug management.        Patient is an 83-year-old female presenting with extremity weakness after being found down.  At the time initial evaluation, patient is hypertensive and hypoxic.  Differential diagnosis includes stroke, intracranial hemorrhage, [place] abnormality, acute coronary syndrome, hypoglycemia to name a few.    Judie alert was called.  NIHSS is 24.    The patient was placed on the cardiac monitor for continuous monitoring of rhythm and vitals. EKG ordered to evaluate patient's current cardiac rate, rhythm, and QT interval. CBC was ordered as part of my assessment for possible infection, anemia or thrombocytopenia CMP to assess electrolytes, kidney function, liver function or any metabolic derangements. Lactic acid as a marker of hypoperfusion or ischemia. Troponin as a marker for myocardial ischemia or heart strain. Protime-INR to assess liver's synthetic function, and to ensure INR is within goal range. CT head without contrast to evaluate for hemorrhage or masses. CTA head and neck to evaluate for vascular occlusion, dissection or aneurysms.      CBC does not suggest acute pathology.  EKG does not suggest acute ischemic pathology.  Point-of-care glucose is 129.  Lactic acid normal.  CMP shows decimal 5.3 which is hemolyzed.  Bicarb of 20, blood Kos 131, creatinine 1.3, calcium of 10.7, alkaline phosphatase of 34.  INR is 1.1.  aPTT is normal.  CT head, CTA head and neck is concerning for intraparenchymal left thalamic and periventricular hemorrhage.    Patient's

## 2024-04-24 NOTE — CARE COORDINATION
Social Work /Transition of Care:    Pt presents to the ED secondary to altered mental status and right sided weakness at [facility].  Per paperwork from facility, pt is a DNRCC.  Per report from staff at facility, they went in to give pt her morning medication and found pt on the floor with her right side flaccid.    ED physician and SW spoke to pt's sister/POA, Melvi, concerning pt's condition and plan of care.  All questions answered.  Plan will be for pt to have hospice care.  SW provided freedom of choice and pt's sister would like referral to [facility].  SW made referral to Judie with [facility].

## 2024-04-24 NOTE — PROGRESS NOTES
Consult received and chart reviewed. Visit made to the bedside. Sister Melvi at the bedside. Hospice philosophy and services discussed. All questions answered. Pt is alert but answers all questions with \"no\". Pt does follow some commands. Sister states pt had been agitated earlier and then was medicated. Pt does not appear completely comfortable. Call placed to JOSE Greene, she has accepted the pt for GIP level of care at the hospice house. Please leave all IV sites intact. N2N will be provided by [facility] liaison. [facility] to transport pt at 10pm. Transport pack in soft chart. PAS stated they will try to come sooner but they were running behind. Updates provided to , MIYA and Bedside.     Electronically signed by Judie Cornejo RN on 4/24/2024 at 6:06 PM  [phone]

## 2024-04-24 NOTE — ED NOTES
[facility]  at bedside administering last rights   
[facility] to hospice eta is 2200, orders for prn morphine to be given for pt comfort, N2N called per hospice   
Pts sister at bedside   
Social work, ED physician,sister and  at bedside, plan of care discussed   
WILBER ALERT CALLED@[phone]  
None
